# Patient Record
Sex: FEMALE | Race: WHITE | Employment: FULL TIME | URBAN - METROPOLITAN AREA
[De-identification: names, ages, dates, MRNs, and addresses within clinical notes are randomized per-mention and may not be internally consistent; named-entity substitution may affect disease eponyms.]

---

## 2017-01-23 ENCOUNTER — E-VISIT (OUTPATIENT)
Dept: FAMILY MEDICINE CLINIC | Facility: CLINIC | Age: 37
End: 2017-01-23

## 2017-01-23 DIAGNOSIS — Z02.9 ADMINISTRATIVE ENCOUNTER: Primary | ICD-10-CM

## 2017-01-23 PROCEDURE — 98969 ONLINE SERVICE BY HC PRO: CPT | Performed by: NURSE PRACTITIONER

## 2017-01-23 NOTE — PROGRESS NOTES
Patient submitted e-visit on 1/19/17. Other e-visit provider made myself aware that e-visit was not addressed. I called patient and apologized that e-visit was not addressed in promised time. Patient reports she went to another walk-in clinic for care.

## 2017-03-06 PROCEDURE — 82043 UR ALBUMIN QUANTITATIVE: CPT | Performed by: FAMILY MEDICINE

## 2017-03-06 PROCEDURE — 82570 ASSAY OF URINE CREATININE: CPT | Performed by: FAMILY MEDICINE

## 2017-03-06 NOTE — PROGRESS NOTES
Katheryn Pereyra is a 39year old female. HPI:   #1 follow-up on generalized anxiety disorder and depression    -- Mom and dad do live with family. Mom has fibromyalgia and Dad has dementia and is stable.    --Felt anxiety was better so weaned her mehrdad total) by mouth nightly. Disp: 30 tablet Rfl: 3   Rizatriptan Benzoate 10 MG Oral Tab TAKE 1 TABLET BY MOUTH AS NEEDED FOR MIGRAINE (TAKE AT ONSET AND REPEAT 2 HOURS LATER IF NEEDED).  Disp: 36 tablet Rfl: 1   alprazolam (XANAX) 0.5 MG Oral Tab Take 1 table exertion  CARDIOVASCULAR: denies chest pain on exertion  GI: denies abdominal pain and denies heartburn  NEURO: denies headaches  Musculoskeletal: No motor deficits  Psych see above  EXAM:   /41 mmHg  Pulse 80  Ht 62.01\"  Wt 178 lb  BMI 32.55 kg/m2 week then increase to 300 mg.    2. KENDY (generalized anxiety disorder)  BuSpar 5 mg in the morning 10 mg at night    3.  Intractable migraine without aura and without status migrainosus  Continue with Maxalt as needed  Amitriptyline at night 10-20 mg for pearl

## 2017-03-07 ENCOUNTER — TELEPHONE (OUTPATIENT)
Dept: FAMILY MEDICINE CLINIC | Facility: CLINIC | Age: 37
End: 2017-03-07

## 2017-03-07 ENCOUNTER — PATIENT MESSAGE (OUTPATIENT)
Dept: FAMILY MEDICINE CLINIC | Facility: CLINIC | Age: 37
End: 2017-03-07

## 2017-03-07 RX ORDER — BUPROPION HYDROCHLORIDE 150 MG/1
150 TABLET ORAL DAILY
Qty: 10 TABLET | Refills: 0 | Status: SHIPPED | OUTPATIENT
Start: 2017-03-07 | End: 2017-07-11

## 2017-03-07 RX ORDER — BUPROPION HYDROCHLORIDE 150 MG/1
150 TABLET ORAL DAILY
Qty: 10 TABLET | Refills: 0 | Status: SHIPPED | OUTPATIENT
Start: 2017-03-07 | End: 2017-03-07

## 2017-03-07 NOTE — TELEPHONE ENCOUNTER
----- Message from [address] [place] [address] [address] Generic sent at 3/7/2017  7:36 AM CST -----  Regarding: Prescription Question  Contact: [phone]  Gareth Mejia,  I picked up my prescriptions last night and the prescription they gave me for Wellbutrin is for 300mg.  I asked th

## 2017-03-07 NOTE — TELEPHONE ENCOUNTER
msg sent back to patient that the 150mg tablets were sent by our office and asked her to contact the pharmacy as they might of filled it incorrectly

## 2017-03-07 NOTE — TELEPHONE ENCOUNTER
Pt called and said she triple checked with the pharmacy and there is no prescription there for the 150mg Wellbutrin. They have the other 2 prescriptions but not the 150mg.

## 2017-03-15 ENCOUNTER — TELEPHONE (OUTPATIENT)
Dept: FAMILY MEDICINE CLINIC | Facility: CLINIC | Age: 37
End: 2017-03-15

## 2017-03-15 NOTE — TELEPHONE ENCOUNTER
Adelina's children had the flu. She feels she is getting it now.   She would like to know if Chris could be called in?

## 2017-03-15 NOTE — TELEPHONE ENCOUNTER
Patient has had two of her children diagnosed with influenza B within the past week. Patient is starting to feel aches and congestion. She is requesting a script for tamiflu.   Please advise

## 2017-03-16 RX ORDER — OSELTAMIVIR PHOSPHATE 75 MG/1
75 CAPSULE ORAL 2 TIMES DAILY
Qty: 10 CAPSULE | Refills: 0 | Status: SHIPPED | OUTPATIENT
Start: 2017-03-16 | End: 2017-05-09

## 2017-05-09 ENCOUNTER — HOSPITAL ENCOUNTER (EMERGENCY)
Age: 37
Discharge: HOME | End: 2017-05-09
Attending: EMERGENCY MEDICINE
Payer: COMMERCIAL

## 2017-05-09 ENCOUNTER — APPOINTMENT (OUTPATIENT)
Dept: ULTRASOUND IMAGING | Age: 37
End: 2017-05-09
Attending: EMERGENCY MEDICINE
Payer: COMMERCIAL

## 2017-05-09 ENCOUNTER — TELEPHONE (OUTPATIENT)
Dept: FAMILY MEDICINE CLINIC | Facility: CLINIC | Age: 37
End: 2017-05-09

## 2017-05-09 VITALS
WEIGHT: 175 LBS | SYSTOLIC BLOOD PRESSURE: 98 MMHG | DIASTOLIC BLOOD PRESSURE: 42 MMHG | RESPIRATION RATE: 16 BRPM | HEIGHT: 62 IN | OXYGEN SATURATION: 100 % | TEMPERATURE: 98 F | BODY MASS INDEX: 32.2 KG/M2 | HEART RATE: 75 BPM

## 2017-05-09 DIAGNOSIS — N30.90 CYSTITIS: Primary | ICD-10-CM

## 2017-05-09 PROCEDURE — 99284 EMERGENCY DEPT VISIT MOD MDM: CPT

## 2017-05-09 PROCEDURE — 80053 COMPREHEN METABOLIC PANEL: CPT | Performed by: EMERGENCY MEDICINE

## 2017-05-09 PROCEDURE — 76770 US EXAM ABDO BACK WALL COMP: CPT | Performed by: EMERGENCY MEDICINE

## 2017-05-09 PROCEDURE — 81015 MICROSCOPIC EXAM OF URINE: CPT | Performed by: EMERGENCY MEDICINE

## 2017-05-09 PROCEDURE — 81001 URINALYSIS AUTO W/SCOPE: CPT | Performed by: EMERGENCY MEDICINE

## 2017-05-09 PROCEDURE — 85025 COMPLETE CBC W/AUTO DIFF WBC: CPT | Performed by: EMERGENCY MEDICINE

## 2017-05-09 PROCEDURE — 36415 COLL VENOUS BLD VENIPUNCTURE: CPT

## 2017-05-09 PROCEDURE — 87086 URINE CULTURE/COLONY COUNT: CPT | Performed by: EMERGENCY MEDICINE

## 2017-05-09 RX ORDER — HYDROCODONE BITARTRATE AND ACETAMINOPHEN 5; 325 MG/1; MG/1
1 TABLET ORAL EVERY 4 HOURS PRN
Qty: 20 TABLET | Refills: 0 | Status: SHIPPED | OUTPATIENT
Start: 2017-05-09 | End: 2017-05-11

## 2017-05-09 RX ORDER — NITROFURANTOIN 25; 75 MG/1; MG/1
100 CAPSULE ORAL 2 TIMES DAILY
Qty: 20 CAPSULE | Refills: 0 | Status: SHIPPED | OUTPATIENT
Start: 2017-05-09 | End: 2017-05-19

## 2017-05-09 RX ORDER — HYDROCODONE BITARTRATE AND ACETAMINOPHEN 5; 325 MG/1; MG/1
2 TABLET ORAL ONCE
Status: COMPLETED | OUTPATIENT
Start: 2017-05-09 | End: 2017-05-09

## 2017-05-09 RX ORDER — PHENAZOPYRIDINE HYDROCHLORIDE 200 MG/1
200 TABLET, FILM COATED ORAL ONCE
Status: COMPLETED | OUTPATIENT
Start: 2017-05-09 | End: 2017-05-09

## 2017-05-09 RX ORDER — PHENAZOPYRIDINE HYDROCHLORIDE 100 MG/1
100 TABLET, FILM COATED ORAL 3 TIMES DAILY PRN
Qty: 6 TABLET | Refills: 0 | Status: SHIPPED | OUTPATIENT
Start: 2017-05-09 | End: 2017-05-16

## 2017-05-09 NOTE — ED PROVIDER NOTES
Patient Seen in: THE [facility] In [place]    History   Patient presents with:  Abdomen/Flank Pain (GI/)    Stated Complaint: lower abd pain, blood in urine    HPI    28-year-old female complaining of hematuria on the patient noted a sm Take 1 tablet (0.5 mg total) by mouth 2 (two) times daily as needed for Anxiety. Multiple Vitamins-Minerals (EMERGEN-C VITAMIN C OR),  Take by mouth 2 (two) times daily. Cyanocobalamin (VITAMIN B-12 OR),  Take 1,000 mcg by mouth daily.      Cholecalcif Alcohol Use: Yes           0.6 oz/week       1 Glasses of wine per week       Comment: rarely      Review of Systems    Positive for stated complaint: lower abd pain, blood in urine  Other systems are as noted in HPI.   Constitutional and vital normal limits   COMP METABOLIC PANEL (14) - Normal   CBC WITH DIFFERENTIAL WITH PLATELET    Narrative: The following orders were created for panel order CBC WITH DIFFERENTIAL WITH PLATELET.   Procedure                               Abnormality         S

## 2017-05-09 NOTE — TELEPHONE ENCOUNTER
Spoke to patient and she said that the pain is so bad that she may end go to the ER.   She said it just started this AM that she woke up and had burning with urination and noticed some blood but she just went again now and has very bad pain and noticed a lo

## 2017-05-09 NOTE — TELEPHONE ENCOUNTER
Pt called earlier about UTI symptoms and I told her Corie Mckeon was full be she can see Dr. Ramin Torrez and she refused. She called again asking for Pervis Earing due to her symptoms and I notified Que Reyes.

## 2017-07-11 ENCOUNTER — OFFICE VISIT (OUTPATIENT)
Dept: FAMILY MEDICINE CLINIC | Facility: CLINIC | Age: 37
End: 2017-07-11

## 2017-07-11 VITALS
BODY MASS INDEX: 33.68 KG/M2 | SYSTOLIC BLOOD PRESSURE: 90 MMHG | WEIGHT: 183 LBS | HEIGHT: 62 IN | HEART RATE: 88 BPM | DIASTOLIC BLOOD PRESSURE: 60 MMHG

## 2017-07-11 DIAGNOSIS — Z79.899 MEDICATION MANAGEMENT: ICD-10-CM

## 2017-07-11 DIAGNOSIS — F32.5 DEPRESSION, MAJOR, IN REMISSION (HCC): ICD-10-CM

## 2017-07-11 DIAGNOSIS — F41.1 GAD (GENERALIZED ANXIETY DISORDER): ICD-10-CM

## 2017-07-11 DIAGNOSIS — G43.019 INTRACTABLE MIGRAINE WITHOUT AURA AND WITHOUT STATUS MIGRAINOSUS: Primary | ICD-10-CM

## 2017-07-11 DIAGNOSIS — R31.9 HEMATURIA: ICD-10-CM

## 2017-07-11 DIAGNOSIS — Z87.440 RECENT URINARY TRACT INFECTION: ICD-10-CM

## 2017-07-11 LAB
APPEARANCE: CLEAR
BILIRUBIN: NEGATIVE
GLUCOSE (URINE DIPSTICK): NEGATIVE MG/DL
KETONES (URINE DIPSTICK): NEGATIVE MG/DL
LEUKOCYTES: NEGATIVE
MULTISTIX LOT#: ABNORMAL NUMERIC
NITRITE, URINE: NEGATIVE
PH, URINE: 5.5 (ref 4.5–8)
PROTEIN (URINE DIPSTICK): NEGATIVE MG/DL
SPECIFIC GRAVITY: 1.02 (ref 1–1.03)
URINE-COLOR: YELLOW
UROBILINOGEN,SEMI-QN: 0.2 MG/DL (ref 0–1.9)

## 2017-07-11 PROCEDURE — 99214 OFFICE O/P EST MOD 30 MIN: CPT | Performed by: FAMILY MEDICINE

## 2017-07-11 PROCEDURE — 81003 URINALYSIS AUTO W/O SCOPE: CPT | Performed by: FAMILY MEDICINE

## 2017-07-11 RX ORDER — AMITRIPTYLINE HYDROCHLORIDE 10 MG/1
TABLET, FILM COATED ORAL NIGHTLY
Qty: 30 TABLET | Refills: 5 | Status: SHIPPED | OUTPATIENT
Start: 2017-07-11 | End: 2017-10-29

## 2017-07-11 RX ORDER — ALPRAZOLAM 0.5 MG/1
0.5 TABLET ORAL 2 TIMES DAILY PRN
Qty: 45 TABLET | Refills: 1 | Status: SHIPPED | OUTPATIENT
Start: 2017-07-11 | End: 2018-02-05

## 2017-07-11 RX ORDER — BUSPIRONE HYDROCHLORIDE 10 MG/1
TABLET ORAL
Qty: 270 TABLET | Refills: 1 | Status: SHIPPED | OUTPATIENT
Start: 2017-07-11 | End: 2018-02-21

## 2017-07-11 RX ORDER — RIZATRIPTAN BENZOATE 10 MG/1
TABLET ORAL
Qty: 9 TABLET | Refills: 5 | Status: SHIPPED | OUTPATIENT
Start: 2017-07-11 | End: 2017-07-11

## 2017-07-11 RX ORDER — RIZATRIPTAN BENZOATE 10 MG/1
TABLET ORAL
Qty: 12 TABLET | Refills: 5 | Status: SHIPPED | OUTPATIENT
Start: 2017-07-11 | End: 2018-02-05

## 2017-07-11 RX ORDER — BUPROPION HYDROCHLORIDE 150 MG/1
150 TABLET ORAL DAILY
Qty: 30 TABLET | Refills: 5 | Status: SHIPPED | OUTPATIENT
Start: 2017-07-11 | End: 2018-06-08 | Stop reason: DRUGHIGH

## 2017-07-11 NOTE — PROGRESS NOTES
Gisselle Carrillo is a 39year old female.   HPI:   Patient presents with:    #1 f/u Migraine:   --refill on Amitrytiline & Maxalt  --HA are a lot better is having them once a week if mild migraine uses one Maxalt but sometimes tries Excedrin first   On Disp: 270 tablet Rfl: 1   ALPRAZolam (XANAX) 0.5 MG Oral Tab Take 1 tablet (0.5 mg total) by mouth 2 (two) times daily as needed for Anxiety.  Disp: 45 tablet Rfl: 1   Rizatriptan Benzoate 10 MG Oral Tab TAKE 1 TABLET BY MOUTH AS NEEDED FOR MIGRAINE (TAKE A apparent distress  SKIN: no rashes,no suspicious lesions  EYES: PERRLA, EOM intact, sclera clear without injection  NECK: supple,no adenopathy, thyroid normal to palpation  LUNGS: clear to auscultation no rales, rhonchi or wheezes  CARDIO: RRR without murm REPEAT 2 HOURS LATER IF NEEDED). Dispense: 12 tablet; Refill: 5    2. KENDY (generalized anxiety disorder)  Continue present medications conservative use of Xanax advised  - BusPIRone HCl 10 MG Oral Tab; 5 mg AM and 20 mg at night  Dispense: 270 tablet;  Ref

## 2017-07-11 NOTE — PROGRESS NOTES
Do a repeat urinalysis with microscopic in 1-2 weeks due to the blood present drink plenty of water before sampling.

## 2017-08-03 ENCOUNTER — LAB ENCOUNTER (OUTPATIENT)
Dept: LAB | Age: 37
End: 2017-08-03
Attending: FAMILY MEDICINE
Payer: COMMERCIAL

## 2017-08-03 ENCOUNTER — OFFICE VISIT (OUTPATIENT)
Dept: FAMILY MEDICINE CLINIC | Facility: CLINIC | Age: 37
End: 2017-08-03

## 2017-08-03 VITALS
WEIGHT: 184 LBS | SYSTOLIC BLOOD PRESSURE: 124 MMHG | BODY MASS INDEX: 33.86 KG/M2 | HEIGHT: 62 IN | HEART RATE: 86 BPM | DIASTOLIC BLOOD PRESSURE: 70 MMHG

## 2017-08-03 DIAGNOSIS — G89.29 CHRONIC RIGHT FLANK PAIN: ICD-10-CM

## 2017-08-03 DIAGNOSIS — Z13.220 LIPID SCREENING: ICD-10-CM

## 2017-08-03 DIAGNOSIS — R31.29 MICROSCOPIC HEMATURIA: ICD-10-CM

## 2017-08-03 DIAGNOSIS — R10.9 CHRONIC RIGHT FLANK PAIN: ICD-10-CM

## 2017-08-03 DIAGNOSIS — R31.29 MICROSCOPIC HEMATURIA: Primary | ICD-10-CM

## 2017-08-03 LAB
APPEARANCE: CLEAR
BILIRUBIN: NEGATIVE
BUN BLD-MCNC: 11 MG/DL (ref 8–20)
CALCIUM BLD-MCNC: 8.9 MG/DL (ref 8.3–10.3)
CHLORIDE: 109 MMOL/L (ref 101–111)
CHOLEST SMN-MCNC: 151 MG/DL (ref ?–200)
CO2: 26 MMOL/L (ref 22–32)
CREAT BLD-MCNC: 0.89 MG/DL (ref 0.55–1.02)
GLUCOSE (URINE DIPSTICK): NEGATIVE MG/DL
GLUCOSE BLD-MCNC: 90 MG/DL (ref 70–99)
HDLC SERPL-MCNC: 54 MG/DL (ref 45–?)
HDLC SERPL: 2.8 {RATIO} (ref ?–4.44)
KETONES (URINE DIPSTICK): NEGATIVE MG/DL
LDLC SERPL CALC-MCNC: 91 MG/DL (ref ?–130)
LDLC SERPL-MCNC: 6 MG/DL (ref 5–40)
LEUKOCYTES: NEGATIVE
MULTISTIX LOT#: NORMAL NUMERIC
NITRITE, URINE: NEGATIVE
NONHDLC SERPL-MCNC: 97 MG/DL (ref ?–130)
PH, URINE: 6.5 (ref 4.5–8)
POTASSIUM SERPL-SCNC: 4.3 MMOL/L (ref 3.6–5.1)
PROTEIN (URINE DIPSTICK): NEGATIVE MG/DL
SODIUM SERPL-SCNC: 140 MMOL/L (ref 136–144)
SPECIFIC GRAVITY: 1.01 (ref 1–1.03)
TRIGLYCERIDES: 28 MG/DL (ref ?–150)
URINE-COLOR: YELLOW
UROBILINOGEN,SEMI-QN: 0.2 MG/DL (ref 0–1.9)

## 2017-08-03 PROCEDURE — 80048 BASIC METABOLIC PNL TOTAL CA: CPT | Performed by: FAMILY MEDICINE

## 2017-08-03 PROCEDURE — 87086 URINE CULTURE/COLONY COUNT: CPT | Performed by: FAMILY MEDICINE

## 2017-08-03 PROCEDURE — 81003 URINALYSIS AUTO W/O SCOPE: CPT | Performed by: FAMILY MEDICINE

## 2017-08-03 PROCEDURE — 99213 OFFICE O/P EST LOW 20 MIN: CPT | Performed by: FAMILY MEDICINE

## 2017-08-03 PROCEDURE — 36415 COLL VENOUS BLD VENIPUNCTURE: CPT | Performed by: FAMILY MEDICINE

## 2017-08-03 PROCEDURE — 80061 LIPID PANEL: CPT | Performed by: FAMILY MEDICINE

## 2017-08-03 NOTE — PATIENT INSTRUCTIONS
Walter Organic Raw Apple Cider Vinegar with the mother 2 or 3 teaspoons (10-15 ml) in an 8 ounce glass of water, before meals or whenever heartburn is experienced. Hematuria: Possible Causes     Many things can lead to blood in the urine (hematuria).  [employer] [place] [address]. All rights reserved. This information is not intended as a substitute for professional medical care. Always follow your healthcare professional's instructions.

## 2017-08-03 NOTE — PROGRESS NOTES
Isaiah Brewster is a 39year old female. HPI:   F/U hematuria   Persistent microscopic had gross hematuria and may related to presumed UTI normal bladder kidney ultrasound.   No FH bladder or kidney cancer  Right lower back pain comes and goes throbb Rizatriptan Benzoate 10 MG Oral Tab TAKE 1 TABLET BY MOUTH AS NEEDED FOR MIGRAINE (TAKE AT ONSET AND REPEAT 2 HOURS LATER IF NEEDED).  Disp: 12 tablet Rfl: 5   Multiple Vitamins-Minerals (EMERGEN-C VITAMIN C OR) Take 1 packet by mouth 2 (two) times daily negative   EXTREMITIES: no cyanosis, clubbing or edema  Musculoskeletal: No gross deficit  Neurological: nerves II through XII grossly intact no sensorimotor deficit  Psychological: Mood and affect are normal.  Good communication skills.     Results for ord

## 2017-08-17 ENCOUNTER — PATIENT MESSAGE (OUTPATIENT)
Dept: FAMILY MEDICINE CLINIC | Facility: CLINIC | Age: 37
End: 2017-08-17

## 2017-08-18 NOTE — TELEPHONE ENCOUNTER
Fatigue has been under a lot of stress. Has appointment for Monday to discuss further. Also had CT abdomen done no kidney stones did have small cluster of prominent lymph nodes less than 7 mm in size.   Patient has no abdominal pain had some intermittent r

## 2017-08-18 NOTE — TELEPHONE ENCOUNTER
From: Greer Like  To: Dwain Corrigan PA-C  Sent: 8/17/2017 10:29 PM CDT  Subject: Test Results Question    Linette Harley,  Have you reviewed the results of my CT scan yet? I'm a bit nervous about it.

## 2017-08-18 NOTE — TELEPHONE ENCOUNTER
Has a lump that is mobile harder in nature, not connected to rib cage left side below the breast in the front noticed it last 2 night  Irregular in naature hard to find sometimes, Not tender except due to touching possible lipoma.

## 2017-08-21 ENCOUNTER — OFFICE VISIT (OUTPATIENT)
Dept: FAMILY MEDICINE CLINIC | Facility: CLINIC | Age: 37
End: 2017-08-21

## 2017-08-21 VITALS
HEART RATE: 106 BPM | DIASTOLIC BLOOD PRESSURE: 70 MMHG | SYSTOLIC BLOOD PRESSURE: 114 MMHG | OXYGEN SATURATION: 99 % | TEMPERATURE: 98 F | BODY MASS INDEX: 33.49 KG/M2 | WEIGHT: 182 LBS | HEIGHT: 62 IN | RESPIRATION RATE: 16 BRPM

## 2017-08-21 DIAGNOSIS — Z12.39 BREAST CANCER SCREENING: ICD-10-CM

## 2017-08-21 DIAGNOSIS — Z87.448 HISTORY OF HEMATURIA: ICD-10-CM

## 2017-08-21 DIAGNOSIS — R22.2 MASS OF CHEST WALL, LEFT: Primary | ICD-10-CM

## 2017-08-21 LAB
APPEARANCE: CLEAR
MULTISTIX LOT#: NORMAL NUMERIC
PH, URINE: 5.5 (ref 4.5–8)
SPECIFIC GRAVITY: 1.03 (ref 1–1.03)
URINE-COLOR: YELLOW
UROBILINOGEN,SEMI-QN: 0.2 MG/DL (ref 0–1.9)

## 2017-08-21 PROCEDURE — 81003 URINALYSIS AUTO W/O SCOPE: CPT | Performed by: FAMILY MEDICINE

## 2017-08-21 PROCEDURE — 99213 OFFICE O/P EST LOW 20 MIN: CPT | Performed by: FAMILY MEDICINE

## 2017-08-21 NOTE — PROGRESS NOTES
Luan Brenner is a 40year old female. HPI:   Patient presents for evaluation of a soft tissue mass on the right anterior aspect of her lower chest wall.   Patient states notices about a week ago somewhat tender is not sure is just because she has Biotin 1000 MCG Oral Tab Take 1,000 mcg by mouth daily. Disp:  Rfl:    Multiple Vitamin (MULTIVITAMIN OR) Take 1 Tab by mouth daily. Disp:  Rfl:    Omega-3 Fatty Acids (FISH OIL OR) Take 1 tablet by mouth daily.    Disp:  Rfl:       Past Medical History: deficit  Psychological: Mood and affect are normal.  Good communication skills.   ASSESSMENT AND PLAN:   Mass of chest wall, left  (primary encounter diagnosis)  Breast cancer screening  History of hematuria      Orders Placed This Encounter      Urine Dip,

## 2017-09-25 ENCOUNTER — MED REC SCAN ONLY (OUTPATIENT)
Dept: FAMILY MEDICINE CLINIC | Facility: CLINIC | Age: 37
End: 2017-09-25

## 2017-10-29 DIAGNOSIS — G43.019 INTRACTABLE MIGRAINE WITHOUT AURA AND WITHOUT STATUS MIGRAINOSUS: ICD-10-CM

## 2017-10-30 RX ORDER — AMITRIPTYLINE HYDROCHLORIDE 10 MG/1
TABLET, FILM COATED ORAL NIGHTLY
Qty: 30 TABLET | Refills: 2 | Status: SHIPPED | OUTPATIENT
Start: 2017-10-30 | End: 2018-05-29

## 2018-02-05 DIAGNOSIS — F41.1 GAD (GENERALIZED ANXIETY DISORDER): ICD-10-CM

## 2018-02-05 DIAGNOSIS — G43.019 INTRACTABLE MIGRAINE WITHOUT AURA AND WITHOUT STATUS MIGRAINOSUS: ICD-10-CM

## 2018-02-05 RX ORDER — RIZATRIPTAN BENZOATE 10 MG/1
TABLET ORAL
Qty: 12 TABLET | Refills: 0 | Status: SHIPPED | OUTPATIENT
Start: 2018-02-05 | End: 2018-03-11

## 2018-02-05 RX ORDER — ALPRAZOLAM 0.5 MG/1
TABLET ORAL
Qty: 30 TABLET | Refills: 0 | Status: SHIPPED | OUTPATIENT
Start: 2018-02-05 | End: 2018-07-03

## 2018-02-21 DIAGNOSIS — F41.1 GAD (GENERALIZED ANXIETY DISORDER): ICD-10-CM

## 2018-02-21 RX ORDER — BUSPIRONE HYDROCHLORIDE 10 MG/1
TABLET ORAL
Qty: 75 TABLET | Refills: 0 | Status: SHIPPED | OUTPATIENT
Start: 2018-02-21 | End: 2018-12-27

## 2018-02-23 ENCOUNTER — LAB ENCOUNTER (OUTPATIENT)
Dept: LAB | Age: 38
End: 2018-02-23
Attending: FAMILY MEDICINE
Payer: COMMERCIAL

## 2018-02-23 ENCOUNTER — OFFICE VISIT (OUTPATIENT)
Dept: FAMILY MEDICINE CLINIC | Facility: CLINIC | Age: 38
End: 2018-02-23

## 2018-02-23 VITALS
DIASTOLIC BLOOD PRESSURE: 60 MMHG | WEIGHT: 194 LBS | SYSTOLIC BLOOD PRESSURE: 90 MMHG | TEMPERATURE: 98 F | BODY MASS INDEX: 34.81 KG/M2 | HEART RATE: 84 BPM | HEIGHT: 62.76 IN

## 2018-02-23 DIAGNOSIS — R51.9 DAILY HEADACHE: ICD-10-CM

## 2018-02-23 DIAGNOSIS — J01.00 ACUTE NON-RECURRENT MAXILLARY SINUSITIS: ICD-10-CM

## 2018-02-23 DIAGNOSIS — G43.019 INTRACTABLE MIGRAINE WITHOUT AURA AND WITHOUT STATUS MIGRAINOSUS: ICD-10-CM

## 2018-02-23 DIAGNOSIS — R41.3 MEMORY CHANGE: ICD-10-CM

## 2018-02-23 DIAGNOSIS — F41.1 GAD (GENERALIZED ANXIETY DISORDER): ICD-10-CM

## 2018-02-23 DIAGNOSIS — R42 DIZZINESS: ICD-10-CM

## 2018-02-23 DIAGNOSIS — Z81.8 FH: DEMENTIA: ICD-10-CM

## 2018-02-23 DIAGNOSIS — R41.3 MEMORY CHANGE: Primary | ICD-10-CM

## 2018-02-23 DIAGNOSIS — E04.1 NONTOXIC UNINODULAR GOITER: ICD-10-CM

## 2018-02-23 DIAGNOSIS — E04.1 THYROID NODULE: ICD-10-CM

## 2018-02-23 LAB
BASOPHILS # BLD AUTO: 0.04 X10(3) UL (ref 0–0.1)
BASOPHILS NFR BLD AUTO: 0.3 %
EOSINOPHIL # BLD AUTO: 0.06 X10(3) UL (ref 0–0.3)
EOSINOPHIL NFR BLD AUTO: 0.5 %
ERYTHROCYTE [DISTWIDTH] IN BLOOD BY AUTOMATED COUNT: 12.2 % (ref 11.5–16)
FREE T4: 1 NG/DL (ref 0.9–1.8)
HAV AB SERPL IA-ACNC: >2000 PG/ML (ref 193–986)
HCT VFR BLD AUTO: 44.8 % (ref 34–50)
HGB BLD-MCNC: 14.9 G/DL (ref 12–16)
IMMATURE GRANULOCYTE COUNT: 0.07 X10(3) UL (ref 0–1)
IMMATURE GRANULOCYTE RATIO %: 0.5 %
LYMPHOCYTES # BLD AUTO: 3.85 X10(3) UL (ref 0.9–4)
LYMPHOCYTES NFR BLD AUTO: 29.4 %
MCH RBC QN AUTO: 31.1 PG (ref 27–33.2)
MCHC RBC AUTO-ENTMCNC: 33.3 G/DL (ref 31–37)
MCV RBC AUTO: 93.5 FL (ref 81–100)
MONOCYTES # BLD AUTO: 0.71 X10(3) UL (ref 0.1–1)
MONOCYTES NFR BLD AUTO: 5.4 %
NEUTROPHIL ABS PRELIM: 8.38 X10 (3) UL (ref 1.3–6.7)
NEUTROPHILS # BLD AUTO: 8.38 X10(3) UL (ref 1.3–6.7)
NEUTROPHILS NFR BLD AUTO: 63.9 %
PLATELET # BLD AUTO: 366 10(3)UL (ref 150–450)
RBC # BLD AUTO: 4.79 X10(6)UL (ref 3.8–5.1)
RED CELL DISTRIBUTION WIDTH-SD: 42.5 FL (ref 35.1–46.3)
SED RATE-ML: 8 MM/HR (ref 0–25)
TSI SER-ACNC: 0.84 MIU/ML (ref 0.35–5.5)
WBC # BLD AUTO: 13.1 X10(3) UL (ref 4–13)

## 2018-02-23 PROCEDURE — 84443 ASSAY THYROID STIM HORMONE: CPT | Performed by: FAMILY MEDICINE

## 2018-02-23 PROCEDURE — 85652 RBC SED RATE AUTOMATED: CPT | Performed by: FAMILY MEDICINE

## 2018-02-23 PROCEDURE — 85025 COMPLETE CBC W/AUTO DIFF WBC: CPT | Performed by: FAMILY MEDICINE

## 2018-02-23 PROCEDURE — 36415 COLL VENOUS BLD VENIPUNCTURE: CPT | Performed by: FAMILY MEDICINE

## 2018-02-23 PROCEDURE — 82607 VITAMIN B-12: CPT | Performed by: FAMILY MEDICINE

## 2018-02-23 PROCEDURE — 99214 OFFICE O/P EST MOD 30 MIN: CPT | Performed by: FAMILY MEDICINE

## 2018-02-23 PROCEDURE — 84439 ASSAY OF FREE THYROXINE: CPT | Performed by: FAMILY MEDICINE

## 2018-02-23 RX ORDER — PREDNISONE 10 MG/1
TABLET ORAL
Qty: 18 TABLET | Refills: 0 | Status: SHIPPED | OUTPATIENT
Start: 2018-02-23 | End: 2018-02-23 | Stop reason: CLARIF

## 2018-02-23 RX ORDER — AMOXICILLIN AND CLAVULANATE POTASSIUM 875; 125 MG/1; MG/1
1 TABLET, FILM COATED ORAL 2 TIMES DAILY
COMMUNITY
Start: 2018-02-21 | End: 2018-03-05 | Stop reason: ALTCHOICE

## 2018-02-23 RX ORDER — PREDNISONE 10 MG/1
TABLET ORAL
Qty: 21 TABLET | Refills: 0 | Status: SHIPPED | OUTPATIENT
Start: 2018-02-23 | End: 2018-03-05 | Stop reason: ALTCHOICE

## 2018-02-23 RX ORDER — ONDANSETRON 4 MG/1
4 TABLET, ORALLY DISINTEGRATING ORAL EVERY 8 HOURS PRN
Qty: 20 TABLET | Refills: 0 | Status: SHIPPED | OUTPATIENT
Start: 2018-02-23 | End: 2020-10-17

## 2018-02-23 RX ORDER — DIAZEPAM 5 MG/1
TABLET ORAL
Qty: 2 TABLET | Refills: 0 | Status: SHIPPED | OUTPATIENT
Start: 2018-02-23 | End: 2018-03-23 | Stop reason: ALTCHOICE

## 2018-02-23 RX ORDER — PREDNISONE 10 MG/1
TABLET ORAL AS DIRECTED
COMMUNITY
Start: 2018-02-21 | End: 2018-02-23

## 2018-02-23 RX ORDER — OSELTAMIVIR PHOSPHATE 75 MG/1
CAPSULE ORAL
COMMUNITY
Start: 2018-01-21 | End: 2018-02-23

## 2018-02-23 NOTE — PROGRESS NOTES
HPI:   Madhav Chaidez is a 40year old female who presents for headache, congestion and ear pain, dizziness on and off and swelling lymph nodes.   Patient had gone to the immediate care center on Wednesday diagnosed with lymphangitis and otitis media her thyroid. Needs order. Current Outpatient Prescriptions:  Amoxicillin-Pot Clavulanate 875-125 MG Oral Tab Take 1 tablet by mouth 2 (two) times daily.  Disp:  Rfl:    diazepam (VALIUM) 5 MG Oral Tab Take 5 mg 30 minutes before procedure if needed repe OTHER SURGICAL HISTORY      Comment: Excision of right axillary mass by Dr. Hafsa Short                at [facility]  No date: TOTAL ABDOM HYSTERECTOMY   Family History   Problem Relation Age of Onset   • Lipids Father      Hyperlipidemia   • Other[other] [ Days)   BMI 34.63 kg/m²   GENERAL: NAD, pleasant, not ill appearing, not lethargic does look fatigued is able to communicate effectively  SKIN: no visible rashes  EYES: PERRLA, EOMI, conjunctiva are non-injected  HENT: NCAT, EACs clear b/l, TMs normal b/l (AOV=37968)  US THYROID (CPT=76536)  1. Memory change  Due to dizziness, daily headaches and memory changes will do MRI brain  Valium before procedure order when approved by insurance  - MRI BRAIN/IAC (ALL W+WO CNTRST) (CPT=70553);  Future  - diazepam (ABBE counseling regarding medical conditions and treatment. The patient verbalizes understanding of the treatment and agrees with the plan. The patient is asked to return if symptoms persist or worsen.

## 2018-02-24 NOTE — PROGRESS NOTES
B12 has too much reduce B12 to 500 mcg daily. Slight elevation in white blood cell count and neutrophils consistent with bacterial infection continue with Augmentin.   Sed rate and thyroid are normal.

## 2018-02-26 ENCOUNTER — TELEPHONE (OUTPATIENT)
Dept: FAMILY MEDICINE CLINIC | Facility: CLINIC | Age: 38
End: 2018-02-26

## 2018-02-26 NOTE — TELEPHONE ENCOUNTER
----- Message from Brock Araiza PA-C sent at 2/23/2018  8:35 PM CST -----  B12 has too much reduce B12 to 500 mcg daily. Slight elevation in white blood cell count and neutrophils consistent with bacterial infection continue with Augmentin.   Sed rate

## 2018-02-27 ENCOUNTER — TELEPHONE (OUTPATIENT)
Dept: FAMILY MEDICINE CLINIC | Facility: CLINIC | Age: 38
End: 2018-02-27

## 2018-02-27 NOTE — TELEPHONE ENCOUNTER
Pt called and said she needs her MRI and US orders sent to [address] @ [phone]. I faxed them over.   She also said she is almost done with her antibiotic and she is getting a yeast infection and wants to know if something can be called in for

## 2018-02-28 RX ORDER — FLUCONAZOLE 150 MG/1
TABLET ORAL
Qty: 2 TABLET | Refills: 0 | Status: SHIPPED | OUTPATIENT
Start: 2018-02-28 | End: 2018-03-23 | Stop reason: ALTCHOICE

## 2018-03-05 ENCOUNTER — TELEPHONE (OUTPATIENT)
Dept: FAMILY MEDICINE CLINIC | Facility: CLINIC | Age: 38
End: 2018-03-05

## 2018-03-05 NOTE — TELEPHONE ENCOUNTER
Per June Wang PA-C, the patient was contacted in order to get an update on her symptoms.   The patient reported that she finished her steroids on Friday and over the weekend her ear pain returned along with pain in the glands behind her ears as well

## 2018-03-05 NOTE — TELEPHONE ENCOUNTER
Jerry Hess is calling to let Migdalia Longo know that she finished her steroids on Friday, and she is beginning to have ear pain again, she would like to speak with a nurse please call her at [phone]

## 2018-03-11 DIAGNOSIS — G43.019 INTRACTABLE MIGRAINE WITHOUT AURA AND WITHOUT STATUS MIGRAINOSUS: ICD-10-CM

## 2018-03-12 RX ORDER — RIZATRIPTAN BENZOATE 10 MG/1
TABLET ORAL
Qty: 12 TABLET | Refills: 0 | Status: SHIPPED | OUTPATIENT
Start: 2018-03-12 | End: 2018-04-10

## 2018-03-22 ENCOUNTER — TELEPHONE (OUTPATIENT)
Dept: FAMILY MEDICINE CLINIC | Facility: CLINIC | Age: 38
End: 2018-03-22

## 2018-03-22 NOTE — TELEPHONE ENCOUNTER
I confirmed in Epic that the right thyroid had been previously biopsied and discusses it with Roxy Adam PA-C, per her request.  Per Roxy Adam PA-C, the patient was informed that her Thyroid US showed a stable dominant 2.1 cm nodule right th

## 2018-03-23 NOTE — PROGRESS NOTES
Mony Samuels is a 40year old female. HPI:   Patient is in for follow-up on right ear pain happens intermittently. Admits that this does cause her some anxiety.   Lately do well the stress with her parents, money and recently with her full-time j guilt  no  8. Concentration: diminished ability to think or concentrate, or more indecisiveness  Is an issue with the fatigue  9. Suicidality: Thoughts of death or suicide, or has suicide plan   No suicidal or homicidal thoughts.         Current Outpatient Alcohol use: Yes           0.6 - 1.2 oz/week     Glasses of wine: 1 - 2 per week     Comment: rarely       REVIEW OF SYSTEMS:   GENERAL HEALTH: feels well otherwise  SKIN: denies any unusual skin lesions or rashes  RESPIRATORY: denies shortness of judah symptoms again recommended the increase to 300 mg patient will follow-up in the next month. Discussed if any problems call office immediately especially if gets increased depression, anxiety or any homicidal or suicidal symptoms.      2. Fatigue, unspecifi

## 2018-03-24 PROBLEM — F33.0 DEPRESSION, MAJOR, RECURRENT, MILD: Status: ACTIVE | Noted: 2018-03-24

## 2018-03-24 PROBLEM — F33.0 DEPRESSION, MAJOR, RECURRENT, MILD (HCC): Status: ACTIVE | Noted: 2018-03-24

## 2018-04-10 DIAGNOSIS — G43.019 INTRACTABLE MIGRAINE WITHOUT AURA AND WITHOUT STATUS MIGRAINOSUS: ICD-10-CM

## 2018-04-11 RX ORDER — RIZATRIPTAN BENZOATE 10 MG/1
TABLET ORAL
Qty: 12 TABLET | Refills: 0 | Status: SHIPPED | OUTPATIENT
Start: 2018-04-11 | End: 2018-05-29

## 2018-05-16 ENCOUNTER — HOSPITAL ENCOUNTER (EMERGENCY)
Age: 38
Discharge: HOME | End: 2018-05-16
Attending: EMERGENCY MEDICINE
Payer: COMMERCIAL

## 2018-05-17 NOTE — ED INITIAL ASSESSMENT (HPI)
PT STS [facility] DIAGNOSED WITH STREP THROAT AND STARTED ANTIBIOTICS YEST AND GIVEN PREDNISONE.

## 2018-05-17 NOTE — ED NOTES
PT AND MOTHER DEFENSIVE THROUGHOUT TRIAGE WITH RN. ASKING IF [facility] CALLED HERE AND SPOKE TO ANYONE REGARDING PT. PT THEN REPLIED \"OF COURSE THEY DID\". RN REINFORCED PT EDUCATION REGARDING HER ALREADY PRESCRIBED ANTIBIOTICS AND STEROIDS.  AFTER W

## 2018-05-18 ENCOUNTER — TELEPHONE (OUTPATIENT)
Dept: FAMILY MEDICINE CLINIC | Facility: CLINIC | Age: 38
End: 2018-05-18

## 2018-05-18 RX ORDER — FLUCONAZOLE 150 MG/1
TABLET ORAL
Qty: 2 TABLET | Refills: 0 | Status: SHIPPED | OUTPATIENT
Start: 2018-05-18 | End: 2018-12-31 | Stop reason: ALTCHOICE

## 2018-05-18 NOTE — TELEPHONE ENCOUNTER
Peter Bravo has been dealing with strep throat all week, she was put on a antibiotic and she thinks she developed a yeast infection from the antibiotic, she is just wondering what Patti Bridges would like her to do since it is the weekend, can she call something in

## 2018-05-29 DIAGNOSIS — G43.019 INTRACTABLE MIGRAINE WITHOUT AURA AND WITHOUT STATUS MIGRAINOSUS: ICD-10-CM

## 2018-05-30 RX ORDER — AMITRIPTYLINE HYDROCHLORIDE 10 MG/1
TABLET, FILM COATED ORAL NIGHTLY
Qty: 30 TABLET | Refills: 0 | Status: SHIPPED | OUTPATIENT
Start: 2018-05-30 | End: 2018-09-01

## 2018-05-30 RX ORDER — RIZATRIPTAN BENZOATE 10 MG/1
TABLET ORAL
Qty: 12 TABLET | Refills: 0 | Status: SHIPPED | OUTPATIENT
Start: 2018-05-30 | End: 2018-07-03

## 2018-06-08 RX ORDER — BUPROPION HYDROCHLORIDE 300 MG/1
300 TABLET ORAL DAILY
Qty: 30 TABLET | Refills: 1 | Status: SHIPPED | OUTPATIENT
Start: 2018-06-08 | End: 2018-08-06

## 2018-06-21 ENCOUNTER — OFFICE VISIT (OUTPATIENT)
Dept: FAMILY MEDICINE CLINIC | Facility: CLINIC | Age: 38
End: 2018-06-21

## 2018-06-21 VITALS
HEIGHT: 62.36 IN | DIASTOLIC BLOOD PRESSURE: 64 MMHG | SYSTOLIC BLOOD PRESSURE: 90 MMHG | WEIGHT: 183 LBS | HEART RATE: 100 BPM | BODY MASS INDEX: 33.25 KG/M2

## 2018-06-21 DIAGNOSIS — Z00.00 LABORATORY EXAMINATION ORDERED AS PART OF A ROUTINE GENERAL MEDICAL EXAMINATION: ICD-10-CM

## 2018-06-21 DIAGNOSIS — B99.9 RECURRENT INFECTIONS: ICD-10-CM

## 2018-06-21 DIAGNOSIS — Z90.710 HISTORY OF HYSTERECTOMY: ICD-10-CM

## 2018-06-21 DIAGNOSIS — Z00.00 WELL FEMALE EXAM WITHOUT GYNECOLOGICAL EXAM: Primary | ICD-10-CM

## 2018-06-21 DIAGNOSIS — R23.2 HOT FLASHES: ICD-10-CM

## 2018-06-21 PROCEDURE — 99395 PREV VISIT EST AGE 18-39: CPT | Performed by: FAMILY MEDICINE

## 2018-06-21 NOTE — PROGRESS NOTES
HPI:   Dagoberto Worthy is a 40year old female who presents for a complete physical exam.    Symptoms: denies discharge, itching, burning or dysuria, is S/P VERA, ovaries preserved.   Abnormal pap none had chronic pain after IUD was removed was diagnos x10(3) uL   Lymphocyte Absolute 3.85 0.90 - 4.00 x10(3) uL   Monocyte Absolute 0.71 0.10 - 1.00 x10(3) uL   Eosinophil Absolute 0.06 0.00 - 0.30 x10(3) uL   Basophil Absolute 0.04 0.00 - 0.10 x10(3) uL   Immature Granulocyte Absolute 0.07 0.00 - 1.00 x10(3 lipoma  :   1198: OTHER SURGICAL HISTORY      Comment: sinus surgery  10/28/2011: OTHER SURGICAL HISTORY      Comment: Excision of right axillary mass by Dr. Matt Moreau                at [facility]  No date: TOTAL ABDOM HYSTERECTOMY   Family H edema  VASCULAR: denies leg cramps  GI: denies abdominal pain, bowel movement changes, blood in stool  : denies urinary problems, vaginal discharge or discomfort,  periods hysterectomy  MUSCULOSKELETAL: denies joint pain or stiffness  NEURO: denies heada (Automated) [E]      LH (Luteinizing Hormone) [E]      [address] [facility] [E]      Estradiol [E]    Meds & Refills for this Visit:  No prescriptions requested or ordered in this encounter    Imaging & Consults:  None  1.  Well female exam without gynecological exam    Kiana

## 2018-07-03 ENCOUNTER — LAB ENCOUNTER (OUTPATIENT)
Dept: LAB | Age: 38
End: 2018-07-03
Attending: FAMILY MEDICINE
Payer: COMMERCIAL

## 2018-07-03 DIAGNOSIS — R53.83 FATIGUE, UNSPECIFIED TYPE: ICD-10-CM

## 2018-07-03 DIAGNOSIS — Z00.00 LABORATORY EXAMINATION ORDERED AS PART OF A ROUTINE GENERAL MEDICAL EXAMINATION: ICD-10-CM

## 2018-07-03 DIAGNOSIS — G43.019 INTRACTABLE MIGRAINE WITHOUT AURA AND WITHOUT STATUS MIGRAINOSUS: ICD-10-CM

## 2018-07-03 DIAGNOSIS — R63.5 WEIGHT GAIN: ICD-10-CM

## 2018-07-03 DIAGNOSIS — F41.1 GAD (GENERALIZED ANXIETY DISORDER): ICD-10-CM

## 2018-07-03 DIAGNOSIS — B99.9 RECURRENT INFECTIONS: ICD-10-CM

## 2018-07-03 DIAGNOSIS — R23.2 HOT FLASHES: ICD-10-CM

## 2018-07-03 LAB
BASOPHILS # BLD AUTO: 0.04 X10(3) UL (ref 0–0.1)
BASOPHILS NFR BLD AUTO: 0.6 %
CHOLEST SMN-MCNC: 175 MG/DL (ref ?–200)
CORTISOL: 8.3 UG/DL
EOSINOPHIL # BLD AUTO: 0.13 X10(3) UL (ref 0–0.3)
EOSINOPHIL NFR BLD AUTO: 1.8 %
ERYTHROCYTE [DISTWIDTH] IN BLOOD BY AUTOMATED COUNT: 12.5 % (ref 11.5–16)
ESTRADIOL: 116.3 PG/ML
FSH: 2.1 MIU/ML
HAV AB SERPL IA-ACNC: 460 PG/ML (ref 193–986)
HCT VFR BLD AUTO: 43.4 % (ref 34–50)
HDLC SERPL-MCNC: 47 MG/DL (ref 45–?)
HDLC SERPL: 3.72 {RATIO} (ref ?–4.44)
HGB BLD-MCNC: 14 G/DL (ref 12–16)
IMMATURE GRANULOCYTE COUNT: 0.03 X10(3) UL (ref 0–1)
IMMATURE GRANULOCYTE RATIO %: 0.4 %
LDLC SERPL CALC-MCNC: 119 MG/DL (ref ?–130)
LH: 6.7 MIU/ML
LYMPHOCYTES # BLD AUTO: 1.56 X10(3) UL (ref 0.9–4)
LYMPHOCYTES NFR BLD AUTO: 21.7 %
MCH RBC QN AUTO: 30.6 PG (ref 27–33.2)
MCHC RBC AUTO-ENTMCNC: 32.3 G/DL (ref 31–37)
MCV RBC AUTO: 94.8 FL (ref 81–100)
MONOCYTES # BLD AUTO: 0.45 X10(3) UL (ref 0.1–1)
MONOCYTES NFR BLD AUTO: 6.3 %
NEUTROPHIL ABS PRELIM: 4.97 X10 (3) UL (ref 1.3–6.7)
NEUTROPHILS # BLD AUTO: 4.97 X10(3) UL (ref 1.3–6.7)
NEUTROPHILS NFR BLD AUTO: 69.2 %
NONHDLC SERPL-MCNC: 128 MG/DL (ref ?–130)
PLATELET # BLD AUTO: 341 10(3)UL (ref 150–450)
RBC # BLD AUTO: 4.58 X10(6)UL (ref 3.8–5.1)
RED CELL DISTRIBUTION WIDTH-SD: 43.8 FL (ref 35.1–46.3)
SED RATE-ML: 15 MM/HR (ref 0–25)
TRIGL SERPL-MCNC: 44 MG/DL (ref ?–150)
VLDLC SERPL CALC-MCNC: 9 MG/DL (ref 5–40)
WBC # BLD AUTO: 7.2 X10(3) UL (ref 4–13)

## 2018-07-03 PROCEDURE — 82607 VITAMIN B-12: CPT | Performed by: FAMILY MEDICINE

## 2018-07-03 PROCEDURE — 85025 COMPLETE CBC W/AUTO DIFF WBC: CPT | Performed by: FAMILY MEDICINE

## 2018-07-03 PROCEDURE — 85652 RBC SED RATE AUTOMATED: CPT | Performed by: FAMILY MEDICINE

## 2018-07-03 PROCEDURE — 82670 ASSAY OF TOTAL ESTRADIOL: CPT | Performed by: FAMILY MEDICINE

## 2018-07-03 PROCEDURE — 82533 TOTAL CORTISOL: CPT | Performed by: FAMILY MEDICINE

## 2018-07-03 PROCEDURE — 36415 COLL VENOUS BLD VENIPUNCTURE: CPT | Performed by: FAMILY MEDICINE

## 2018-07-03 PROCEDURE — 83001 ASSAY OF GONADOTROPIN (FSH): CPT | Performed by: FAMILY MEDICINE

## 2018-07-03 PROCEDURE — 83002 ASSAY OF GONADOTROPIN (LH): CPT | Performed by: FAMILY MEDICINE

## 2018-07-03 PROCEDURE — 80061 LIPID PANEL: CPT | Performed by: FAMILY MEDICINE

## 2018-07-03 RX ORDER — ALPRAZOLAM 0.5 MG/1
TABLET ORAL
Qty: 30 TABLET | Refills: 0 | Status: SHIPPED | OUTPATIENT
Start: 2018-07-03 | End: 2018-10-10

## 2018-07-03 RX ORDER — RIZATRIPTAN BENZOATE 10 MG/1
TABLET ORAL
Qty: 12 TABLET | Refills: 0 | Status: SHIPPED | OUTPATIENT
Start: 2018-07-03 | End: 2018-08-09

## 2018-07-03 NOTE — TELEPHONE ENCOUNTER
Rizatriptan approved qty 12    Dr Mike Hunt advise refill of alprazolam  Last rx 2/5/18 qty 30 NR  Last ov 6/21/18 for physical

## 2018-07-07 NOTE — PROGRESS NOTES
Labs are all normal including hormone levels. No signs of menopause estrogen is adequately elevated.   Sent to my chart

## 2018-08-06 RX ORDER — BUPROPION HYDROCHLORIDE 300 MG/1
TABLET ORAL
Qty: 90 TABLET | Refills: 0 | Status: SHIPPED | OUTPATIENT
Start: 2018-08-06 | End: 2018-12-01

## 2018-08-09 DIAGNOSIS — G43.019 INTRACTABLE MIGRAINE WITHOUT AURA AND WITHOUT STATUS MIGRAINOSUS: ICD-10-CM

## 2018-08-10 RX ORDER — RIZATRIPTAN BENZOATE 10 MG/1
TABLET ORAL
Qty: 12 TABLET | Refills: 0 | Status: SHIPPED | OUTPATIENT
Start: 2018-08-10 | End: 2018-09-11

## 2018-09-01 DIAGNOSIS — G43.019 INTRACTABLE MIGRAINE WITHOUT AURA AND WITHOUT STATUS MIGRAINOSUS: ICD-10-CM

## 2018-09-04 RX ORDER — AMITRIPTYLINE HYDROCHLORIDE 10 MG/1
TABLET, FILM COATED ORAL NIGHTLY
Qty: 30 TABLET | Refills: 0 | Status: SHIPPED | OUTPATIENT
Start: 2018-09-04 | End: 2018-10-10

## 2018-09-11 DIAGNOSIS — G43.019 INTRACTABLE MIGRAINE WITHOUT AURA AND WITHOUT STATUS MIGRAINOSUS: ICD-10-CM

## 2018-09-11 RX ORDER — RIZATRIPTAN BENZOATE 10 MG/1
TABLET ORAL
Qty: 12 TABLET | Refills: 0 | Status: SHIPPED | OUTPATIENT
Start: 2018-09-11 | End: 2018-10-04

## 2018-10-04 DIAGNOSIS — G43.019 INTRACTABLE MIGRAINE WITHOUT AURA AND WITHOUT STATUS MIGRAINOSUS: ICD-10-CM

## 2018-10-04 RX ORDER — RIZATRIPTAN BENZOATE 10 MG/1
TABLET ORAL
Qty: 12 TABLET | Refills: 0 | Status: SHIPPED | OUTPATIENT
Start: 2018-10-04 | End: 2018-11-21

## 2018-10-10 DIAGNOSIS — G43.019 INTRACTABLE MIGRAINE WITHOUT AURA AND WITHOUT STATUS MIGRAINOSUS: ICD-10-CM

## 2018-10-10 DIAGNOSIS — F41.1 GAD (GENERALIZED ANXIETY DISORDER): ICD-10-CM

## 2018-10-10 RX ORDER — ALPRAZOLAM 0.5 MG/1
TABLET ORAL
Qty: 30 TABLET | Refills: 0 | Status: SHIPPED | OUTPATIENT
Start: 2018-10-10 | End: 2019-09-10

## 2018-10-10 RX ORDER — AMITRIPTYLINE HYDROCHLORIDE 10 MG/1
TABLET, FILM COATED ORAL NIGHTLY
Qty: 30 TABLET | Refills: 1 | Status: SHIPPED | OUTPATIENT
Start: 2018-10-10 | End: 2018-12-29

## 2018-10-11 DIAGNOSIS — F41.1 GAD (GENERALIZED ANXIETY DISORDER): ICD-10-CM

## 2018-10-12 RX ORDER — ALPRAZOLAM 0.5 MG/1
TABLET ORAL
Qty: 30 TABLET | Refills: 0 | OUTPATIENT
Start: 2018-10-12

## 2018-11-21 DIAGNOSIS — G43.019 INTRACTABLE MIGRAINE WITHOUT AURA AND WITHOUT STATUS MIGRAINOSUS: ICD-10-CM

## 2018-11-21 RX ORDER — RIZATRIPTAN BENZOATE 10 MG/1
TABLET ORAL
Qty: 12 TABLET | Refills: 0 | Status: SHIPPED | OUTPATIENT
Start: 2018-11-21 | End: 2018-12-19

## 2018-12-03 RX ORDER — BUPROPION HYDROCHLORIDE 300 MG/1
TABLET ORAL
Qty: 30 TABLET | Refills: 0 | Status: SHIPPED | OUTPATIENT
Start: 2018-12-03 | End: 2019-09-10

## 2018-12-03 NOTE — TELEPHONE ENCOUNTER
Bupropion approved qty 30 NR  Patient due for f/u  Please call to schedule appt-will need for further refills  [phone] (home)

## 2018-12-03 NOTE — TELEPHONE ENCOUNTER
ralphom for pt that she call the office to schedule an appt as she is due sometime in Dec/Jan for her 6 month follow up visit.

## 2018-12-19 DIAGNOSIS — G43.019 INTRACTABLE MIGRAINE WITHOUT AURA AND WITHOUT STATUS MIGRAINOSUS: ICD-10-CM

## 2018-12-20 RX ORDER — RIZATRIPTAN BENZOATE 10 MG/1
TABLET ORAL
Qty: 12 TABLET | Refills: 0 | Status: SHIPPED | OUTPATIENT
Start: 2018-12-20 | End: 2018-12-31

## 2018-12-27 DIAGNOSIS — F41.1 GAD (GENERALIZED ANXIETY DISORDER): ICD-10-CM

## 2018-12-28 RX ORDER — BUSPIRONE HYDROCHLORIDE 10 MG/1
TABLET ORAL
Qty: 75 TABLET | Refills: 0 | Status: SHIPPED | OUTPATIENT
Start: 2018-12-28 | End: 2019-03-21

## 2018-12-29 DIAGNOSIS — G43.019 INTRACTABLE MIGRAINE WITHOUT AURA AND WITHOUT STATUS MIGRAINOSUS: ICD-10-CM

## 2018-12-31 ENCOUNTER — OFFICE VISIT (OUTPATIENT)
Dept: FAMILY MEDICINE CLINIC | Facility: CLINIC | Age: 38
End: 2018-12-31
Payer: COMMERCIAL

## 2018-12-31 VITALS
HEIGHT: 62.36 IN | DIASTOLIC BLOOD PRESSURE: 64 MMHG | SYSTOLIC BLOOD PRESSURE: 84 MMHG | BODY MASS INDEX: 32.34 KG/M2 | HEART RATE: 88 BPM | WEIGHT: 178 LBS

## 2018-12-31 DIAGNOSIS — M25.552 LEFT HIP PAIN: ICD-10-CM

## 2018-12-31 DIAGNOSIS — F41.1 GAD (GENERALIZED ANXIETY DISORDER): ICD-10-CM

## 2018-12-31 DIAGNOSIS — G43.019 INTRACTABLE MIGRAINE WITHOUT AURA AND WITHOUT STATUS MIGRAINOSUS: Primary | ICD-10-CM

## 2018-12-31 PROCEDURE — 99214 OFFICE O/P EST MOD 30 MIN: CPT | Performed by: FAMILY MEDICINE

## 2018-12-31 RX ORDER — RIZATRIPTAN BENZOATE 10 MG/1
TABLET ORAL
Qty: 12 TABLET | Refills: 1 | Status: SHIPPED | OUTPATIENT
Start: 2018-12-31 | End: 2019-03-21

## 2018-12-31 RX ORDER — AMITRIPTYLINE HYDROCHLORIDE 10 MG/1
TABLET, FILM COATED ORAL NIGHTLY
Qty: 30 TABLET | Refills: 0 | Status: SHIPPED | OUTPATIENT
Start: 2018-12-31 | End: 2019-01-15

## 2018-12-31 NOTE — TELEPHONE ENCOUNTER
rx approved qty 30 NR  Future Appointments   Date Time Provider Brina Nadia   12/31/2018 12:00 PM Daisy Mccord PA-C [facility] [place]

## 2018-12-31 NOTE — PROGRESS NOTES
Dell Mistry is a 45year old female. HPI:   Anxiety follow up   Better with marriage less stress though still difficulties with her mom with the stress and her dad. They live with her in the basement.   Dealing with mom   Past year stress   Is t mouth every 8 (eight) hours as needed for Nausea. Disp: 20 tablet Rfl: 0   Multiple Vitamins-Minerals (EMERGEN-C VITAMIN C OR) Take 1 packet by mouth 2 (two) times daily as needed.    Disp:  Rfl:    Cholecalciferol (VITAMIN D3) 1000 UNITS Oral Cap Take 1 ta sensorimotor deficit  Psychological: Mood and affect are normal.  Good communication skills.   ASSESSMENT AND PLAN:     Intractable migraine without aura and without status migrainosus  (primary encounter diagnosis)  Left hip pain  Sonu (generalized anxiety

## 2019-01-15 DIAGNOSIS — G43.019 INTRACTABLE MIGRAINE WITHOUT AURA AND WITHOUT STATUS MIGRAINOSUS: ICD-10-CM

## 2019-01-15 RX ORDER — AMITRIPTYLINE HYDROCHLORIDE 10 MG/1
TABLET, FILM COATED ORAL NIGHTLY
Qty: 90 TABLET | Refills: 0 | Status: SHIPPED | OUTPATIENT
Start: 2019-01-15 | End: 2019-04-17

## 2019-01-15 NOTE — TELEPHONE ENCOUNTER
Luize Feeling    Rx asking for a 90 day refill? [id number] [address]urch for? Quantity changed to 90 if ok.   thanks

## 2019-01-28 ENCOUNTER — HOSPITAL ENCOUNTER (OUTPATIENT)
Dept: GENERAL RADIOLOGY | Age: 39
Discharge: HOME | End: 2019-01-28
Attending: FAMILY MEDICINE
Payer: COMMERCIAL

## 2019-01-28 DIAGNOSIS — M25.552 LEFT HIP PAIN: ICD-10-CM

## 2019-01-28 PROCEDURE — 73502 X-RAY EXAM HIP UNI 2-3 VIEWS: CPT | Performed by: FAMILY MEDICINE

## 2019-01-28 NOTE — PROGRESS NOTES
Normal XR hip do you want to see an orthopaedic for the pain? Benign pelvic phleboliths just areas of benign calcification in the lower pelvis area.   Sent to  My chart

## 2019-03-21 DIAGNOSIS — G43.019 INTRACTABLE MIGRAINE WITHOUT AURA AND WITHOUT STATUS MIGRAINOSUS: ICD-10-CM

## 2019-03-21 DIAGNOSIS — F41.1 GAD (GENERALIZED ANXIETY DISORDER): ICD-10-CM

## 2019-03-22 RX ORDER — RIZATRIPTAN BENZOATE 10 MG/1
TABLET ORAL
Qty: 12 TABLET | Refills: 1 | Status: SHIPPED | OUTPATIENT
Start: 2019-03-22 | End: 2019-05-31

## 2019-03-22 RX ORDER — BUSPIRONE HYDROCHLORIDE 10 MG/1
TABLET ORAL
Qty: 75 TABLET | Refills: 0 | Status: SHIPPED | OUTPATIENT
Start: 2019-03-22 | End: 2019-05-31

## 2019-04-17 ENCOUNTER — TELEPHONE (OUTPATIENT)
Dept: FAMILY MEDICINE CLINIC | Facility: CLINIC | Age: 39
End: 2019-04-17

## 2019-04-17 DIAGNOSIS — G43.019 INTRACTABLE MIGRAINE WITHOUT AURA AND WITHOUT STATUS MIGRAINOSUS: ICD-10-CM

## 2019-04-17 RX ORDER — AMITRIPTYLINE HYDROCHLORIDE 10 MG/1
TABLET, FILM COATED ORAL NIGHTLY
Qty: 90 TABLET | Refills: 0 | Status: SHIPPED | OUTPATIENT
Start: 2019-04-17 | End: 2019-06-10

## 2019-05-31 DIAGNOSIS — G43.019 INTRACTABLE MIGRAINE WITHOUT AURA AND WITHOUT STATUS MIGRAINOSUS: ICD-10-CM

## 2019-05-31 DIAGNOSIS — F41.1 GAD (GENERALIZED ANXIETY DISORDER): ICD-10-CM

## 2019-05-31 RX ORDER — RIZATRIPTAN BENZOATE 10 MG/1
TABLET ORAL
Qty: 12 TABLET | Refills: 0 | Status: SHIPPED | OUTPATIENT
Start: 2019-05-31 | End: 2019-06-25

## 2019-05-31 RX ORDER — BUSPIRONE HYDROCHLORIDE 10 MG/1
TABLET ORAL
Qty: 75 TABLET | Refills: 0 | Status: SHIPPED | OUTPATIENT
Start: 2019-05-31 | End: 2020-01-27 | Stop reason: ALTCHOICE

## 2019-06-10 ENCOUNTER — TELEPHONE (OUTPATIENT)
Dept: FAMILY MEDICINE CLINIC | Facility: CLINIC | Age: 39
End: 2019-06-10

## 2019-06-10 DIAGNOSIS — G43.019 INTRACTABLE MIGRAINE WITHOUT AURA AND WITHOUT STATUS MIGRAINOSUS: ICD-10-CM

## 2019-06-10 RX ORDER — AMITRIPTYLINE HYDROCHLORIDE 10 MG/1
TABLET, FILM COATED ORAL NIGHTLY
Qty: 180 TABLET | Refills: 0 | Status: SHIPPED | OUTPATIENT
Start: 2019-06-10 | End: 2019-09-09

## 2019-06-25 DIAGNOSIS — G43.019 INTRACTABLE MIGRAINE WITHOUT AURA AND WITHOUT STATUS MIGRAINOSUS: ICD-10-CM

## 2019-06-25 RX ORDER — RIZATRIPTAN BENZOATE 10 MG/1
TABLET ORAL
Qty: 12 TABLET | Refills: 0 | Status: SHIPPED | OUTPATIENT
Start: 2019-06-25 | End: 2019-07-30

## 2019-06-26 DIAGNOSIS — G43.019 INTRACTABLE MIGRAINE WITHOUT AURA AND WITHOUT STATUS MIGRAINOSUS: ICD-10-CM

## 2019-06-26 RX ORDER — RIZATRIPTAN BENZOATE 10 MG/1
TABLET ORAL
Qty: 12 TABLET | Refills: 0 | Status: CANCELLED | OUTPATIENT
Start: 2019-06-26

## 2019-07-30 DIAGNOSIS — G43.019 INTRACTABLE MIGRAINE WITHOUT AURA AND WITHOUT STATUS MIGRAINOSUS: ICD-10-CM

## 2019-07-30 NOTE — TELEPHONE ENCOUNTER
Patient asking for refill of rizatriptan. Last OV 12/31/18. Asked to return in 3mo but did not. Last fill 6/25/19 12 tabs no refill. Ok to fill and call her for appointment?

## 2019-07-31 RX ORDER — RIZATRIPTAN BENZOATE 10 MG/1
TABLET ORAL
Qty: 12 TABLET | Refills: 0 | Status: SHIPPED | OUTPATIENT
Start: 2019-07-31 | End: 2019-09-05

## 2019-07-31 NOTE — TELEPHONE ENCOUNTER
Left detailed message for patient advising one last refill was sent and she needs to schedule an appointment for further refills

## 2019-09-05 DIAGNOSIS — G43.019 INTRACTABLE MIGRAINE WITHOUT AURA AND WITHOUT STATUS MIGRAINOSUS: ICD-10-CM

## 2019-09-06 RX ORDER — RIZATRIPTAN BENZOATE 10 MG/1
TABLET ORAL
Qty: 6 TABLET | Refills: 0 | Status: SHIPPED | OUTPATIENT
Start: 2019-09-06 | End: 2019-10-01

## 2019-09-09 DIAGNOSIS — G43.019 INTRACTABLE MIGRAINE WITHOUT AURA AND WITHOUT STATUS MIGRAINOSUS: ICD-10-CM

## 2019-09-09 RX ORDER — AMITRIPTYLINE HYDROCHLORIDE 10 MG/1
TABLET, FILM COATED ORAL NIGHTLY
Qty: 60 TABLET | Refills: 0 | Status: SHIPPED | OUTPATIENT
Start: 2019-09-09 | End: 2019-10-02

## 2019-09-09 NOTE — TELEPHONE ENCOUNTER
Pt requesting refill of AMITRIPTYLINE HCL 10 MG Oral Tab    Last Time Medication was Filled: 6/10/19 qty90    Last Office Visit with PCP: 12/31/18.  AMITRIPTYLINE HCL 10 MG Oral Tab     future appointment 9/10/19

## 2019-09-10 ENCOUNTER — OFFICE VISIT (OUTPATIENT)
Dept: FAMILY MEDICINE CLINIC | Facility: CLINIC | Age: 39
End: 2019-09-10
Payer: COMMERCIAL

## 2019-09-10 VITALS
SYSTOLIC BLOOD PRESSURE: 92 MMHG | HEART RATE: 88 BPM | DIASTOLIC BLOOD PRESSURE: 68 MMHG | HEIGHT: 62.36 IN | WEIGHT: 189 LBS | BODY MASS INDEX: 34.34 KG/M2

## 2019-09-10 DIAGNOSIS — Z00.00 LABORATORY EXAMINATION ORDERED AS PART OF A ROUTINE GENERAL MEDICAL EXAMINATION: ICD-10-CM

## 2019-09-10 DIAGNOSIS — F41.1 GAD (GENERALIZED ANXIETY DISORDER): ICD-10-CM

## 2019-09-10 DIAGNOSIS — Z79.899 MEDICATION MANAGEMENT: ICD-10-CM

## 2019-09-10 DIAGNOSIS — Z23 FLU VACCINE NEED: ICD-10-CM

## 2019-09-10 DIAGNOSIS — F33.0 DEPRESSION, MAJOR, RECURRENT, MILD (HCC): ICD-10-CM

## 2019-09-10 DIAGNOSIS — Z00.00 WELL FEMALE EXAM WITHOUT GYNECOLOGICAL EXAM: Primary | ICD-10-CM

## 2019-09-10 PROCEDURE — 90471 IMMUNIZATION ADMIN: CPT | Performed by: FAMILY MEDICINE

## 2019-09-10 PROCEDURE — 90686 IIV4 VACC NO PRSV 0.5 ML IM: CPT | Performed by: FAMILY MEDICINE

## 2019-09-10 PROCEDURE — 99395 PREV VISIT EST AGE 18-39: CPT | Performed by: FAMILY MEDICINE

## 2019-09-10 PROCEDURE — 99213 OFFICE O/P EST LOW 20 MIN: CPT | Performed by: FAMILY MEDICINE

## 2019-09-10 RX ORDER — ALPRAZOLAM 0.5 MG/1
TABLET ORAL
Qty: 30 TABLET | Refills: 0 | Status: SHIPPED | OUTPATIENT
Start: 2019-09-10 | End: 2020-07-13

## 2019-09-10 RX ORDER — FLUOXETINE HYDROCHLORIDE 20 MG/1
20 CAPSULE ORAL DAILY
Qty: 30 CAPSULE | Refills: 1 | Status: SHIPPED | OUTPATIENT
Start: 2019-09-10 | End: 2019-10-02

## 2019-09-10 NOTE — PROGRESS NOTES
HPI:   Malika Maradiaga is a 44year old female who presents for a complete physical exam. Symptoms: is S/P VERA, ovaries preserved.    #1 follow-up on generalized anxiety disorder mild depression  Was unable to tolerate Wellbutrin due to increase in an 193 - 986 pg/mL   CORTISOL   Result Value Ref Range    Cortisol 8.3 ug/dL   LIPID PANEL   Result Value Ref Range    Cholesterol, Total 175 <200 mg/dL    Triglycerides 44 <150 mg/dL    HDL Cholesterol 47 >45 mg/dL    LDL Cholesterol 119 <130 mg/dL    VLDL 9 Oral Tab TAKE ONE-HALF TABLET BY MOUTH EVERY MORNING THEN TAKE TWO TABLETS BY MOUTH EVERY NIGHT AT BEDTIME (Patient taking differently: Take 20 mg by mouth nightly.  TAKE ONE-HALF TABLET BY MOUTH EVERY MORNING THEN TAKE TWO TABLETS BY MOUTH EVERY NIGHT AT B Anxiety Son    • Anxiety Brother    • Other (Asperger's disorder[other]) Son    • ADHD Brother    • ADHD Son    • OCD Son    • Other (Pervasive developmental disorders[other]) Brother    • Other (Tourette's syndrome[other]) Son    • Other (Type I Arnold-Ch nail bed  HEENT: atraumatic, normocephalic,ears, nose and throat are normal  EYES: PERRLA, EOMI, sclera, conjunctiva are clear  NECK: supple,no adenopathy,no carotid bruits  CHEST: no chest tenderness  BREAST: symmetrical, no suspicious mass, no nipple dim exercise 30 minutes 3-4 times weekly.     2. KENDY (generalized anxiety disorder)Depression, major, recurrent, mild ([facility]  Discussed if any problems call office immediately especially if gets increased depression, anxiety or any homicidal or suicidal symptoms

## 2019-09-11 ENCOUNTER — TELEPHONE (OUTPATIENT)
Dept: FAMILY MEDICINE CLINIC | Facility: CLINIC | Age: 39
End: 2019-09-11

## 2019-09-11 NOTE — TELEPHONE ENCOUNTER
Per Brock Araiza PA-C, [facility] was contacted in order to request that the patient's mammogram from last year be faxed to our office. The representative at [facility] agreed to fax it.

## 2019-10-01 DIAGNOSIS — G43.019 INTRACTABLE MIGRAINE WITHOUT AURA AND WITHOUT STATUS MIGRAINOSUS: ICD-10-CM

## 2019-10-01 RX ORDER — RIZATRIPTAN BENZOATE 10 MG/1
TABLET ORAL
Qty: 6 TABLET | Refills: 0 | Status: SHIPPED | OUTPATIENT
Start: 2019-10-01 | End: 2019-10-02

## 2019-10-02 ENCOUNTER — PATIENT MESSAGE (OUTPATIENT)
Dept: FAMILY MEDICINE CLINIC | Facility: CLINIC | Age: 39
End: 2019-10-02

## 2019-10-02 DIAGNOSIS — F33.0 DEPRESSION, MAJOR, RECURRENT, MILD (HCC): ICD-10-CM

## 2019-10-02 DIAGNOSIS — F41.1 GAD (GENERALIZED ANXIETY DISORDER): ICD-10-CM

## 2019-10-02 DIAGNOSIS — G43.019 INTRACTABLE MIGRAINE WITHOUT AURA AND WITHOUT STATUS MIGRAINOSUS: ICD-10-CM

## 2019-10-02 RX ORDER — AMITRIPTYLINE HYDROCHLORIDE 10 MG/1
TABLET, FILM COATED ORAL NIGHTLY
Qty: 60 TABLET | Refills: 0 | Status: SHIPPED | OUTPATIENT
Start: 2019-10-02 | End: 2019-10-30

## 2019-10-02 RX ORDER — RIZATRIPTAN BENZOATE 10 MG/1
TABLET ORAL
Qty: 12 TABLET | Refills: 2 | Status: SHIPPED | OUTPATIENT
Start: 2019-10-02 | End: 2020-01-09

## 2019-10-02 RX ORDER — FLUOXETINE HYDROCHLORIDE 20 MG/1
CAPSULE ORAL
Qty: 30 CAPSULE | Refills: 0 | Status: SHIPPED | OUTPATIENT
Start: 2019-10-02 | End: 2019-10-30

## 2019-10-02 NOTE — TELEPHONE ENCOUNTER
From: Berenice Blevins  To: Les Garcia PA-C  Sent: 10/2/2019 2:11 PM CDT  Subject: Prescription Question    Hi Alvino Mccann,  My maxalt prescription was called in for only 6 tablets. Do you happen to know why?  I have not picked it up yet, as I wan

## 2019-10-02 NOTE — PROGRESS NOTES
Received prior auth request from [facility] for rizatriptan. Key [id number]. Sent to plan. Patient aware medication requiring prior auth. Will notify patient of outcome of authorization.

## 2019-10-02 NOTE — TELEPHONE ENCOUNTER
Pt requesting refill of FLUOXETINE HCL 20 MG Oral Cap and AMITRIPTYLINE HCL 10 MG Oral Tab, refill approved, sent to pharmacy:       Last Office Visit with PCP: 9/10/19      No future appointments.

## 2019-10-03 NOTE — TELEPHONE ENCOUNTER
Fax received from [address] [place] [address] states the patient's prescription benefit coverage indicates a PA in NOT required for Rizatriptan 10mg. Fax was fwd to the pharmacy.

## 2019-10-30 DIAGNOSIS — G43.019 INTRACTABLE MIGRAINE WITHOUT AURA AND WITHOUT STATUS MIGRAINOSUS: ICD-10-CM

## 2019-10-30 DIAGNOSIS — F41.1 GAD (GENERALIZED ANXIETY DISORDER): ICD-10-CM

## 2019-10-30 DIAGNOSIS — F33.0 DEPRESSION, MAJOR, RECURRENT, MILD (HCC): ICD-10-CM

## 2019-10-30 RX ORDER — AMITRIPTYLINE HYDROCHLORIDE 10 MG/1
TABLET, FILM COATED ORAL NIGHTLY
Qty: 60 TABLET | Refills: 0 | Status: SHIPPED | OUTPATIENT
Start: 2019-10-30 | End: 2019-11-21

## 2019-10-30 RX ORDER — FLUOXETINE HYDROCHLORIDE 20 MG/1
CAPSULE ORAL
Qty: 30 CAPSULE | Refills: 0 | Status: SHIPPED | OUTPATIENT
Start: 2019-10-30 | End: 2019-11-21

## 2019-10-30 NOTE — TELEPHONE ENCOUNTER
Pt requesting refill of AMITRIPTYLINE HCL 10 MG Oral Tab and FLUOXETINE HCL 20 MG Oral Cap refill approved, sent to pharmacy:     Last Time Medication was Filled:  10/2/19    Last Office Visit with PCP: 9/10/19    Has future appointment 11/4/19

## 2019-11-21 DIAGNOSIS — F33.0 DEPRESSION, MAJOR, RECURRENT, MILD (HCC): ICD-10-CM

## 2019-11-21 DIAGNOSIS — F41.1 GAD (GENERALIZED ANXIETY DISORDER): ICD-10-CM

## 2019-11-21 DIAGNOSIS — G43.019 INTRACTABLE MIGRAINE WITHOUT AURA AND WITHOUT STATUS MIGRAINOSUS: ICD-10-CM

## 2019-11-21 RX ORDER — AMITRIPTYLINE HYDROCHLORIDE 10 MG/1
TABLET, FILM COATED ORAL NIGHTLY
Qty: 60 TABLET | Refills: 0 | Status: SHIPPED | OUTPATIENT
Start: 2019-11-21 | End: 2019-12-26

## 2019-11-21 RX ORDER — FLUOXETINE HYDROCHLORIDE 20 MG/1
CAPSULE ORAL
Qty: 30 CAPSULE | Refills: 0 | Status: SHIPPED | OUTPATIENT
Start: 2019-11-21 | End: 2019-12-04

## 2019-11-21 NOTE — TELEPHONE ENCOUNTER
Refill sent to pharmacy for amitriptyline for 30 days. Voicemail left reminding patient Cantu Jessee would like a follow up visit from September.

## 2019-12-04 DIAGNOSIS — F33.0 DEPRESSION, MAJOR, RECURRENT, MILD (HCC): ICD-10-CM

## 2019-12-04 DIAGNOSIS — F41.1 GAD (GENERALIZED ANXIETY DISORDER): ICD-10-CM

## 2019-12-04 RX ORDER — FLUOXETINE HYDROCHLORIDE 20 MG/1
20 CAPSULE ORAL
Qty: 30 CAPSULE | Refills: 0 | OUTPATIENT
Start: 2019-12-04

## 2019-12-10 ENCOUNTER — PATIENT MESSAGE (OUTPATIENT)
Dept: FAMILY MEDICINE CLINIC | Facility: CLINIC | Age: 39
End: 2019-12-10

## 2019-12-10 NOTE — TELEPHONE ENCOUNTER
From: Gaby Mendoza  To: Alireza Dennis PA-C  Sent: 12/10/2019 11:41 AM CST  Subject: Other    Good Morning,  I need to cancel my appointment for today.  I've been sick with a migraine since 4am. I have tried calling several times and each time t

## 2019-12-26 DIAGNOSIS — G43.019 INTRACTABLE MIGRAINE WITHOUT AURA AND WITHOUT STATUS MIGRAINOSUS: ICD-10-CM

## 2019-12-26 RX ORDER — AMITRIPTYLINE HYDROCHLORIDE 10 MG/1
TABLET, FILM COATED ORAL NIGHTLY
Qty: 60 TABLET | Refills: 0 | Status: SHIPPED | OUTPATIENT
Start: 2019-12-26 | End: 2020-01-13

## 2019-12-26 NOTE — TELEPHONE ENCOUNTER
Pt requesting refill of AMITRIPTYLINE HCL 10 MG Oral Tab    Last fillled 11/21/19  LOV 9/10/19    No follow up appointment

## 2020-01-09 DIAGNOSIS — G43.019 INTRACTABLE MIGRAINE WITHOUT AURA AND WITHOUT STATUS MIGRAINOSUS: ICD-10-CM

## 2020-01-09 RX ORDER — RIZATRIPTAN BENZOATE 10 MG/1
TABLET ORAL
Qty: 12 TABLET | Refills: 0 | Status: SHIPPED | OUTPATIENT
Start: 2020-01-09 | End: 2020-01-27

## 2020-01-09 NOTE — TELEPHONE ENCOUNTER
is in he states his wife needed refill on Maxalt. She did not make appointment with neurologist  will remind her to get consult with neurologist.  She is getting migraines very frequently.  Refill provided needs appointment in the office [facility]

## 2020-01-13 DIAGNOSIS — G43.019 INTRACTABLE MIGRAINE WITHOUT AURA AND WITHOUT STATUS MIGRAINOSUS: ICD-10-CM

## 2020-01-13 RX ORDER — AMITRIPTYLINE HYDROCHLORIDE 10 MG/1
TABLET, FILM COATED ORAL NIGHTLY
Qty: 60 TABLET | Refills: 0 | Status: SHIPPED | OUTPATIENT
Start: 2020-01-13 | End: 2020-07-09

## 2020-01-13 NOTE — TELEPHONE ENCOUNTER
Pt requesting refill of AMITRIPTYLINE HCL 10 MG Oral Tab     Last fillled 12/16/19  LOV 9/10/19     Has follow up appointment 1/27/20

## 2020-01-28 NOTE — PROGRESS NOTES
Fly Montgomery is a 44year old female. HPI:   Patient is in for follow-up on generalized anxiety disorder and depression.   Did start the 40 mg of Prozac and is doing well though does describe still more anxiety symptoms and would consider going up Thoughts that you would be better off dead, or of hurting yourself in some way 0   PHQ-9 TOTAL SCORE 4   If you checked off any problems, how difficult have these problems made it for you to do your work, take care of things at home, or get along with ot exertion  CARDIOVASCULAR: denies chest pain on exertion  GI: denies abdominal pain and denies heartburn  NEURO: See above  Musculoskeletal: No motor deficits  Psych see above  EXAM:   /70   Pulse 96   Temp 98.2 °F (36.8 °C) (Oral)   Resp 16   Ht 62. 3 remission ([facility]  Discussed if any problems call office immediately especially if gets increased depression, anxiety or any homicidal or suicidal symptoms. Patient symptoms are presently stable    - FLUoxetine HCl 40 MG Oral Cap;  Take 1 capsule (40 mg tot

## 2020-05-28 DIAGNOSIS — G43.019 INTRACTABLE MIGRAINE WITHOUT AURA AND WITHOUT STATUS MIGRAINOSUS: ICD-10-CM

## 2020-05-28 RX ORDER — RIZATRIPTAN BENZOATE 10 MG/1
TABLET ORAL
Qty: 12 TABLET | Refills: 2 | Status: SHIPPED | OUTPATIENT
Start: 2020-05-28 | End: 2020-09-02

## 2020-05-28 NOTE — TELEPHONE ENCOUNTER
Pt requesting refill of RIZATRIPTAN BENZOATE 10 MG Oral Tab    Last Time Medication was Filled:  1/9/20    Last Office Visit with Provider: 1/27/20. The patient is asked to return in every 6 months annual wellness due 9/10/2020. No future appointments.

## 2020-07-09 DIAGNOSIS — G43.019 INTRACTABLE MIGRAINE WITHOUT AURA AND WITHOUT STATUS MIGRAINOSUS: ICD-10-CM

## 2020-07-09 RX ORDER — AMITRIPTYLINE HYDROCHLORIDE 10 MG/1
TABLET, FILM COATED ORAL NIGHTLY
Qty: 60 TABLET | Refills: 0 | Status: SHIPPED | OUTPATIENT
Start: 2020-07-09 | End: 2020-08-03

## 2020-07-09 NOTE — TELEPHONE ENCOUNTER
LAST REFILLED 1/13/20  LOV 1/27/20. The patient is asked to return in every 6 months annual wellness due 9/10/2020.

## 2020-07-14 NOTE — PROGRESS NOTES
Virtual Telephone Check-In    Victor Manuel Hughes verbally consents to a Virtual/Telephone Check-In visit on 07/14/20. Patient has been referred to the [facility] website at [email or web address] to review the yearly Consent to Treat document.     Patient u consultation with neurology yet. Also uses the Xanax when restless leg is acting up due to her anxiety about a half Xanax at night if needed.   Did try BuSpar before and did not find it to be successful feels the Prozac has been very beneficial.  PHQ9 FLOW tablet by mouth daily. • Omega-3 Fatty Acids (FISH OIL OR) Take 1 tablet by mouth daily.           Past Medical History:   Diagnosis Date   • Lymphedema     r arm   • Migraines    • Multiple thyroid nodules     1997   • Pancreatitis 2000      Social His risks, benefits and precautions of alprazolam discussed. Including not to drive, operate machinery or drink alcohol when taking this medication.   Discussed if any problems call office immediately especially if gets increased depression, anxiety or any homi

## 2020-08-03 DIAGNOSIS — G43.019 INTRACTABLE MIGRAINE WITHOUT AURA AND WITHOUT STATUS MIGRAINOSUS: ICD-10-CM

## 2020-08-03 RX ORDER — AMITRIPTYLINE HYDROCHLORIDE 10 MG/1
TABLET, FILM COATED ORAL
Qty: 60 TABLET | Refills: 0 | Status: SHIPPED | OUTPATIENT
Start: 2020-08-03 | End: 2020-09-11

## 2020-08-03 NOTE — TELEPHONE ENCOUNTER
Pt requesting refill of AMITRIPTYLINE HCL 10 MG Oral Tab    Last Time Medication was Filled:  7/9/20    Last Office Visit with Provider: 7/13/20  No future appointments.

## 2020-08-21 DIAGNOSIS — F33.40 DEPRESSION, MAJOR, RECURRENT, IN REMISSION (HCC): ICD-10-CM

## 2020-08-21 DIAGNOSIS — F41.1 GAD (GENERALIZED ANXIETY DISORDER): ICD-10-CM

## 2020-08-21 RX ORDER — FLUOXETINE HYDROCHLORIDE 40 MG/1
CAPSULE ORAL
Qty: 90 CAPSULE | Refills: 1 | OUTPATIENT
Start: 2020-08-21

## 2020-08-21 NOTE — TELEPHONE ENCOUNTER
Requested Prescriptions     Refused Prescriptions Disp Refills   • FLUOXETINE HCL 40 MG Oral Cap [Pharmacy Med Name: FLUOXETINE HCL 40 MG CAPSULE] 90 capsule 1     Sig: TAKE 1 CAPSULE BY MOUTH EVERY DAY     Filled 20mg on 7/13/20 90 1 refill.  This script d

## 2020-09-01 DIAGNOSIS — G43.019 INTRACTABLE MIGRAINE WITHOUT AURA AND WITHOUT STATUS MIGRAINOSUS: ICD-10-CM

## 2020-09-02 RX ORDER — RIZATRIPTAN BENZOATE 10 MG/1
TABLET ORAL
Qty: 12 TABLET | Refills: 1 | Status: SHIPPED | OUTPATIENT
Start: 2020-09-02 | End: 2020-12-10

## 2020-09-02 NOTE — TELEPHONE ENCOUNTER
Pt requesting refill of RIZATRIPTAN BENZOATE 10 MG Oral Tab    Last Time Medication was Filled:  6/26/20    Last Office Visit with Provider: emilia 7/13/20     No future appointments.

## 2020-09-11 DIAGNOSIS — G43.019 INTRACTABLE MIGRAINE WITHOUT AURA AND WITHOUT STATUS MIGRAINOSUS: ICD-10-CM

## 2020-09-11 RX ORDER — AMITRIPTYLINE HYDROCHLORIDE 10 MG/1
TABLET, FILM COATED ORAL
Qty: 60 TABLET | Refills: 2 | Status: SHIPPED | OUTPATIENT
Start: 2020-09-11 | End: 2021-02-17

## 2020-09-11 NOTE — TELEPHONE ENCOUNTER
Requested Prescriptions     Pending Prescriptions Disp Refills   • AMITRIPTYLINE HCL 10 MG Oral Tab [Pharmacy Med Name: AMITRIPTYLINE 10MG TABLETS] 60 tablet 0     Sig: TAKE 1 TO 2 TABLETS(10 TO 20 MG) BY MOUTH EVERY NIGHT     Last fill 8/3/20 60 tabs 0 re

## 2020-09-21 DIAGNOSIS — F33.40 DEPRESSION, MAJOR, RECURRENT, IN REMISSION (HCC): ICD-10-CM

## 2020-09-21 DIAGNOSIS — F41.1 GAD (GENERALIZED ANXIETY DISORDER): ICD-10-CM

## 2020-09-21 RX ORDER — FLUOXETINE HYDROCHLORIDE 40 MG/1
CAPSULE ORAL
Qty: 90 CAPSULE | Refills: 1 | OUTPATIENT
Start: 2020-09-21

## 2020-10-17 ENCOUNTER — OFFICE VISIT (OUTPATIENT)
Dept: FAMILY MEDICINE CLINIC | Facility: CLINIC | Age: 40
End: 2020-10-17
Payer: COMMERCIAL

## 2020-10-17 VITALS
BODY MASS INDEX: 36.77 KG/M2 | SYSTOLIC BLOOD PRESSURE: 112 MMHG | OXYGEN SATURATION: 99 % | HEIGHT: 62 IN | DIASTOLIC BLOOD PRESSURE: 72 MMHG | TEMPERATURE: 98 F | RESPIRATION RATE: 20 BRPM | HEART RATE: 91 BPM | WEIGHT: 199.81 LBS

## 2020-10-17 DIAGNOSIS — H69.83 DYSFUNCTION OF BOTH EUSTACHIAN TUBES: Primary | ICD-10-CM

## 2020-10-17 PROCEDURE — 3078F DIAST BP <80 MM HG: CPT | Performed by: NURSE PRACTITIONER

## 2020-10-17 PROCEDURE — 3074F SYST BP LT 130 MM HG: CPT | Performed by: NURSE PRACTITIONER

## 2020-10-17 PROCEDURE — 3008F BODY MASS INDEX DOCD: CPT | Performed by: NURSE PRACTITIONER

## 2020-10-17 PROCEDURE — 99213 OFFICE O/P EST LOW 20 MIN: CPT | Performed by: NURSE PRACTITIONER

## 2020-10-17 NOTE — PROGRESS NOTES
Patient presents with:  Ear Problem: left ear pain, left side face pain, x3days       Ramin Ying is a 36year old female who presents for left ear fullness sensation and sharp shooting pain that radiated to nose and back of head.  Problem last  3 • Diabetes Maternal Grandmother         DM   • Thyroid Disorder Maternal Grandmother    • Cancer Maternal Grandmother         brain cancer   • Other (Emphysema[other]) Paternal Grandmother    • Cancer Maternal Grandfather         lymphoma   • Heart Disor bilaterally.   CARDIO: RRR without murmur      ASSESSMENT AND PLAN:   Isaiah Brewster is a 36year old female who presents with:      Dysfunction of both eustachian tubes  (primary encounter diagnosis)    Meds & Refills for this Visit:  Requested Pres

## 2020-10-17 NOTE — PATIENT INSTRUCTIONS
Common Middle Ear Problems  Middle ear problems may be caused by something that occurs at birth or right after birth (congenital). This may be an inherited condition.  Or it may be due to medicines or to a viral infection such as hepatitis, HIV, syphilis, This type of hearing loss can often be treated with medicine or surgery. Sensorineural hearing loss  This is the most common type of lifelong hearing loss. It occurs after damage to the inner ear.  It can also occur when there are problems with the nerves

## 2020-11-13 ENCOUNTER — LAB ENCOUNTER (OUTPATIENT)
Dept: LAB | Facility: HOSPITAL | Age: 40
End: 2020-11-13
Attending: FAMILY MEDICINE
Payer: COMMERCIAL

## 2020-11-13 DIAGNOSIS — Z20.822 EXPOSURE TO COVID-19 VIRUS: Primary | ICD-10-CM

## 2020-11-16 ENCOUNTER — PATIENT MESSAGE (OUTPATIENT)
Dept: FAMILY MEDICINE CLINIC | Facility: CLINIC | Age: 40
End: 2020-11-16

## 2020-11-16 RX ORDER — FLUOXETINE HYDROCHLORIDE 60 MG/1
60 TABLET, FILM COATED ORAL; ORAL DAILY
Qty: 90 TABLET | Refills: 1 | Status: SHIPPED | OUTPATIENT
Start: 2020-11-16 | End: 2020-11-19

## 2020-11-16 NOTE — TELEPHONE ENCOUNTER
From: Mony Samuels  To: Kaiser Daly PA-C  Sent: 11/16/2020 2:20 PM CST  Subject: Prescription Question    Hi [facility] Staff,  I forgot to ask, can you send a script to [facility] for me to get a flu shot?   Thanks,  Deshaun Truong

## 2020-11-16 NOTE — TELEPHONE ENCOUNTER
Received a verbal order per Anthony Padilla to order fluoxetine 60mg. Order pended for signature d/t warning with amitriptyline.

## 2020-11-17 PROBLEM — F33.0 DEPRESSION, MAJOR, RECURRENT, MILD: Status: RESOLVED | Noted: 2018-03-24 | Resolved: 2020-11-17

## 2020-11-17 PROBLEM — F33.0 DEPRESSION, MAJOR, RECURRENT, MILD (HCC): Status: RESOLVED | Noted: 2018-03-24 | Resolved: 2020-11-17

## 2020-11-17 NOTE — PROGRESS NOTES
Stefanie Damon is a 36year old female. HPI:   Please note that the following visit was completed using two-way, real-time interactive audio and video communication.   This has been done in good shahram to provide continuity of care in the best intere Patient plans on coming back in for complete physical will affiliate labs with her wellness exam.  Her PHQ 9 is at a 1.     PHQ9 FLOWSHEET 11/16/2020   Little interest or pleasure in doing things 0   Feeling down, depressed, or hopeless 0   Trouble falling Pancreatitis 2000      Social History:  Social History    Tobacco Use      Smoking status: Never Smoker      Smokeless tobacco: Never Used    Alcohol use:  Yes      Alcohol/week: 0.0 - 2.0 standard drinks      Frequency: 2-4 times a month      Drinks per se Depression, major, recurrent, in remission [facility])  Prescription for Prozac 60 mg tablets called into pharmacy #90 with 1 refill. Patient does not have any interactions with the fluoxetine and amitriptyline.   Takes them at a different time and has not notic

## 2020-11-19 NOTE — TELEPHONE ENCOUNTER
Health plan will not cover Fluoxetine 60mg tablets. Per Pharmacy they filled the Fluoxetine 20mg CAPSULES at three per day and they did cover that.  Health plan prefers capsules and there are not 60 mg caps available  Script updated

## 2020-11-30 ENCOUNTER — PATIENT MESSAGE (OUTPATIENT)
Dept: FAMILY MEDICINE CLINIC | Facility: CLINIC | Age: 40
End: 2020-11-30

## 2020-11-30 NOTE — TELEPHONE ENCOUNTER
From: Amanda Locke  To: Ludmila Boogie PA-C  Sent: 11/30/2020 2:24 PM CST  Subject: Non-Urgent Medical Question    Hi Anya Nguyen tested positive for COVID last weekend. Can you please tell us the first day he can go back to work?   He began

## 2020-12-10 DIAGNOSIS — G43.019 INTRACTABLE MIGRAINE WITHOUT AURA AND WITHOUT STATUS MIGRAINOSUS: ICD-10-CM

## 2020-12-10 RX ORDER — RIZATRIPTAN BENZOATE 10 MG/1
TABLET ORAL
Qty: 12 TABLET | Refills: 1 | Status: SHIPPED | OUTPATIENT
Start: 2020-12-10 | End: 2021-02-16

## 2020-12-10 NOTE — TELEPHONE ENCOUNTER
Requested Prescriptions     Pending Prescriptions Disp Refills   • RIZATRIPTAN BENZOATE 10 MG Oral Tab [Pharmacy Med Name: RIZATRIPTAN 10 MG TABLET] 12 tablet 1     Sig: TAKE 1 TABLET BY MOUTH AT ONSET OF MIGRAINE. MAY REPEAT ONCE 2 HOURS LATER     Last fi

## 2020-12-16 DIAGNOSIS — F41.1 GAD (GENERALIZED ANXIETY DISORDER): ICD-10-CM

## 2020-12-16 DIAGNOSIS — F33.40 DEPRESSION, MAJOR, RECURRENT, IN REMISSION (HCC): ICD-10-CM

## 2020-12-16 RX ORDER — FLUOXETINE HYDROCHLORIDE 20 MG/1
CAPSULE ORAL
Qty: 90 CAPSULE | Refills: 0 | OUTPATIENT
Start: 2020-12-16

## 2020-12-16 NOTE — TELEPHONE ENCOUNTER
Requested Prescriptions     Pending Prescriptions Disp Refills   • FLUOXETINE HCL 20 MG Oral Cap [Pharmacy Med Name: FLUOXETINE 20MG CAPSULES] 90 capsule 0     Sig: TAKE 3 CAPSULES(60 MG) BY MOUTH DAILY     Last fill 11/19/20 for 3 months. Too soon.

## 2020-12-29 ENCOUNTER — APPOINTMENT (OUTPATIENT)
Dept: GENERAL RADIOLOGY | Age: 40
End: 2020-12-29
Attending: EMERGENCY MEDICINE
Payer: COMMERCIAL

## 2020-12-29 ENCOUNTER — HOSPITAL ENCOUNTER (EMERGENCY)
Age: 40
Discharge: HOME | End: 2020-12-29
Attending: EMERGENCY MEDICINE
Payer: COMMERCIAL

## 2020-12-29 VITALS
TEMPERATURE: 98 F | BODY MASS INDEX: 36.07 KG/M2 | RESPIRATION RATE: 18 BRPM | DIASTOLIC BLOOD PRESSURE: 61 MMHG | SYSTOLIC BLOOD PRESSURE: 116 MMHG | HEIGHT: 62 IN | HEART RATE: 91 BPM | OXYGEN SATURATION: 98 % | WEIGHT: 196 LBS

## 2020-12-29 DIAGNOSIS — S39.012A STRAIN OF LUMBAR REGION, INITIAL ENCOUNTER: ICD-10-CM
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
## 2020-12-29 PROCEDURE — 72050 X-RAY EXAM NECK SPINE 4/5VWS: CPT | Performed by: EMERGENCY MEDICINE

## 2020-12-29 PROCEDURE — 96372 THER/PROPH/DIAG INJ SC/IM: CPT

## 2020-12-29 PROCEDURE — 99284 EMERGENCY DEPT VISIT MOD MDM: CPT

## 2020-12-29 PROCEDURE — 73502 X-RAY EXAM HIP UNI 2-3 VIEWS: CPT | Performed by: EMERGENCY MEDICINE

## 2020-12-29 PROCEDURE — 72110 X-RAY EXAM L-2 SPINE 4/>VWS: CPT | Performed by: EMERGENCY MEDICINE

## 2020-12-29 RX ORDER — KETOROLAC TROMETHAMINE 30 MG/ML
60 INJECTION, SOLUTION INTRAMUSCULAR; INTRAVENOUS ONCE
Status: COMPLETED | OUTPATIENT
Start: 2020-12-29 | End: 2020-12-29

## 2020-12-29 RX ORDER — METHOCARBAMOL 500 MG/1
500 TABLET, FILM COATED ORAL 4 TIMES DAILY
Qty: 12 TABLET | Refills: 0 | Status: SHIPPED | OUTPATIENT
Start: 2020-12-29 | End: 2021-01-04
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
## 2020-12-30 ENCOUNTER — OFFICE VISIT (OUTPATIENT)
Dept: FAMILY MEDICINE CLINIC | Facility: CLINIC | Age: 40
End: 2020-12-30
Payer: COMMERCIAL

## 2020-12-30 VITALS
SYSTOLIC BLOOD PRESSURE: 88 MMHG | BODY MASS INDEX: 36.16 KG/M2 | HEIGHT: 62.36 IN | WEIGHT: 199 LBS | DIASTOLIC BLOOD PRESSURE: 60 MMHG | TEMPERATURE: 97 F | HEART RATE: 84 BPM

## 2020-12-30 DIAGNOSIS — Z23 NEED FOR VACCINATION: ICD-10-CM

## 2020-12-30 DIAGNOSIS — S16.1XXA ACUTE STRAIN OF NECK MUSCLE, INITIAL ENCOUNTER: ICD-10-CM
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
## 2020-12-30 DIAGNOSIS — M54.42 ACUTE LEFT-SIDED LOW BACK PAIN WITH LEFT-SIDED SCIATICA: Primary | ICD-10-CM

## 2020-12-30 PROCEDURE — 99214 OFFICE O/P EST MOD 30 MIN: CPT | Performed by: FAMILY MEDICINE

## 2020-12-30 PROCEDURE — 3074F SYST BP LT 130 MM HG: CPT | Performed by: FAMILY MEDICINE

## 2020-12-30 PROCEDURE — 3008F BODY MASS INDEX DOCD: CPT | Performed by: FAMILY MEDICINE

## 2020-12-30 PROCEDURE — 3078F DIAST BP <80 MM HG: CPT | Performed by: FAMILY MEDICINE

## 2020-12-30 PROCEDURE — 90471 IMMUNIZATION ADMIN: CPT | Performed by: FAMILY MEDICINE

## 2020-12-30 PROCEDURE — 90686 IIV4 VACC NO PRSV 0.5 ML IM: CPT | Performed by: FAMILY MEDICINE

## 2020-12-30 RX ORDER — ACETAMINOPHEN AND CODEINE PHOSPHATE 300; 30 MG/1; MG/1
1-2 TABLET ORAL EVERY 6 HOURS PRN
Qty: 20 TABLET | Refills: 0 | Status: SHIPPED | OUTPATIENT
Start: 2020-12-30 | End: 2021-01-09

## 2020-12-30 RX ORDER — FLUTICASONE PROPIONATE 50 MCG
1 SPRAY, SUSPENSION (ML) NASAL DAILY PRN
COMMUNITY
Start: 2020-11-29

## 2020-12-30 RX ORDER — TIZANIDINE 2 MG/1
TABLET ORAL EVERY 6 HOURS PRN
Qty: 30 TABLET | Refills: 0 | Status: SHIPPED | OUTPATIENT
Start: 2020-12-30 | End: 2021-01-09

## 2020-12-30 RX ORDER — METHYLPREDNISOLONE 4 MG/1
TABLET ORAL
Qty: 1 KIT | Refills: 0 | Status: SHIPPED | OUTPATIENT
Start: 2020-12-30 | End: 2021-03-17 | Stop reason: ALTCHOICE
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
## 2020-12-31 PROBLEM — S16.1XXA ACUTE STRAIN OF NECK MUSCLE: Status: ACTIVE | Noted: 2020-12-31

## 2020-12-31 PROBLEM — M54.42 ACUTE LEFT-SIDED LOW BACK PAIN WITH LEFT-SIDED SCIATICA: Status: ACTIVE | Noted: 2020-12-31
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
## 2021-01-04 ENCOUNTER — TELEMEDICINE (OUTPATIENT)
Dept: FAMILY MEDICINE CLINIC | Facility: CLINIC | Age: 41
End: 2021-01-04

## 2021-01-04 DIAGNOSIS — M54.42 ACUTE LEFT-SIDED LOW BACK PAIN WITH LEFT-SIDED SCIATICA: ICD-10-CM
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
## 2021-01-04 PROCEDURE — 99213 OFFICE O/P EST LOW 20 MIN: CPT | Performed by: FAMILY MEDICINE

## 2021-01-04 NOTE — PROGRESS NOTES
Demetri Lincoln is a 36year old female. HPI:   Please note that the following visit was completed using two-way, real-time interactive audio and video communication.   This has been done in good shahram to provide continuity of care in the best intere gotten better sleep since Wednesday with the muscle relaxant. Has not offered a prescription left may need a refill by the end of the week.   Current Outpatient Medications   Medication Sig Dispense Refill   • Fluticasone Propionate 50 MCG/ACT Nasal Suspen shortness of breath with exertion  CARDIOVASCULAR: denies chest pain on exertion  GI: denies abdominal pain and denies heartburn  NEURO: Had a migraine over the weekend secondary to weather changes did resolve  Musculoskeletal: see above  EXAM:   No vitals needed.

## 2021-01-06 ENCOUNTER — PATIENT MESSAGE (OUTPATIENT)
Dept: FAMILY MEDICINE CLINIC | Facility: CLINIC | Age: 41
End: 2021-01-06

## 2021-01-06 ENCOUNTER — TELEPHONE (OUTPATIENT)
Dept: FAMILY MEDICINE CLINIC | Facility: CLINIC | Age: 41
End: 2021-01-06

## 2021-01-06 NOTE — TELEPHONE ENCOUNTER
From: Simón Archibald  To: Christofer Lucas PA-C  Sent: 1/6/2021 4:07 PM CST  Subject: Visit Follow-up Question    Lisa Coronado,  I received a call that the MRI was not approved. Can I try getting approval for a CT scan?  As of last night, I am right

## 2021-01-06 NOTE — TELEPHONE ENCOUNTER
From: Charles Villegas  To: Titus Adame PA-C  Sent: 1/6/2021  4:07 PM CST  Subject: Visit Follow-up Question    Desi Chapman,  I received a call that the MRI was not approved. Can I try getting approval for a CT scan?   As of last night, I am righ

## 2021-01-06 NOTE — TELEPHONE ENCOUNTER
MRI SPINE LUMBAR, Denied    Saadia Palomares [facility] Clinical Staff             Good Morning,     Please see below as this case was not approved by the health plan.  At this time, I will be closing this case.     Thank you   Duane Hatfield     We received your reque [employer] and [employer]. are licensed or certified utilization review entities. guidelines needed to reach this decision.  We found the service requested is not medically necessary   in your case:   Based on [employer]

## 2021-01-07 ENCOUNTER — PATIENT MESSAGE (OUTPATIENT)
Dept: FAMILY MEDICINE CLINIC | Facility: CLINIC | Age: 41
End: 2021-01-07

## 2021-01-07 NOTE — TELEPHONE ENCOUNTER
From: Baljit Merchant  To: Trice Verdin PA-C  Sent: 1/7/2021 7:52 AM CST  Subject: Other    Regarding insurance. ..supposedly I have to go through my health insurance first and then they go back to [employer] to get their money back for what they p

## 2021-01-07 NOTE — TELEPHONE ENCOUNTER
For case #[id number] a reconsideration filing is being placed will take 2 business days and reconsideration will be faxed to us.   There is a good chance that it will be denied again secondary to the classic expectation of  6-week of therapy before MRI appro
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
## 2021-01-07 NOTE — TELEPHONE ENCOUNTER
Will as provider on further recommendations.      Information for a peer to peer @ [phone] option 1   [id number] Magnetic Resonance Imaging (MRI)

## 2021-01-09 DIAGNOSIS — M54.42 ACUTE LEFT-SIDED LOW BACK PAIN WITH LEFT-SIDED SCIATICA: ICD-10-CM

## 2021-01-11 DIAGNOSIS — M54.42 ACUTE LEFT-SIDED LOW BACK PAIN WITH LEFT-SIDED SCIATICA: ICD-10-CM

## 2021-01-11 RX ORDER — ACETAMINOPHEN AND CODEINE PHOSPHATE 300; 30 MG/1; MG/1
1-2 TABLET ORAL EVERY 6 HOURS PRN
Qty: 20 TABLET | Refills: 0 | Status: CANCELLED | OUTPATIENT
Start: 2021-01-11

## 2021-01-11 RX ORDER — ACETAMINOPHEN AND CODEINE PHOSPHATE 300; 30 MG/1; MG/1
1-2 TABLET ORAL EVERY 6 HOURS PRN
Qty: 20 TABLET | Refills: 0 | Status: SHIPPED | OUTPATIENT
Start: 2021-01-11 | End: 2021-03-15

## 2021-01-11 RX ORDER — TIZANIDINE 2 MG/1
TABLET ORAL EVERY 6 HOURS PRN
Qty: 30 TABLET | Refills: 0 | Status: CANCELLED | OUTPATIENT
Start: 2021-01-11

## 2021-01-11 RX ORDER — TIZANIDINE 2 MG/1
TABLET ORAL EVERY 6 HOURS PRN
Qty: 30 TABLET | Refills: 0 | Status: SHIPPED | OUTPATIENT
Start: 2021-01-11 | End: 2021-03-15

## 2021-01-11 NOTE — TELEPHONE ENCOUNTER
ACETAMINOPHEN-CODEINE 12/30/2020 12/30/2020 300-30 20 tablet      TIZANIDINE 2MG TABLETS 12/30/2020 12/30/2020  30 each  2     LOV 1/4/21  The patient is asked to return as needed

## 2021-01-20 DIAGNOSIS — F33.40 DEPRESSION, MAJOR, RECURRENT, IN REMISSION (HCC): ICD-10-CM

## 2021-01-20 DIAGNOSIS — F41.1 GAD (GENERALIZED ANXIETY DISORDER): ICD-10-CM

## 2021-01-20 RX ORDER — FLUOXETINE HYDROCHLORIDE 20 MG/1
60 CAPSULE ORAL DAILY
Qty: 270 CAPSULE | Refills: 0 | OUTPATIENT
Start: 2021-01-20

## 2021-01-20 NOTE — TELEPHONE ENCOUNTER
Requested Prescriptions     Pending Prescriptions Disp Refills   • FLUoxetine HCl 20 MG Oral Cap 270 capsule 0     Sig: Take 3 capsules (60 mg total) by mouth daily. Last fill was 11/19/20 270 caps. Refill too soon.

## 2021-01-29 DIAGNOSIS — F41.1 GAD (GENERALIZED ANXIETY DISORDER): ICD-10-CM

## 2021-01-29 DIAGNOSIS — F33.40 DEPRESSION, MAJOR, RECURRENT, IN REMISSION (HCC): ICD-10-CM

## 2021-01-29 RX ORDER — FLUOXETINE HYDROCHLORIDE 20 MG/1
60 CAPSULE ORAL DAILY
Qty: 270 CAPSULE | Refills: 0 | Status: SHIPPED | OUTPATIENT
Start: 2021-01-29 | End: 2021-06-29

## 2021-01-29 NOTE — TELEPHONE ENCOUNTER
Requested Prescriptions     Pending Prescriptions Disp Refills   • FLUoxetine HCl 20 MG Oral Cap 270 capsule 0     Sig: Take 3 capsules (60 mg total) by mouth daily.      Last filled 11/19/20 270 caps 0 refills  Last OV 1/4/21 acute visit  No future OV

## 2021-02-15 ENCOUNTER — TELEPHONE (OUTPATIENT)
Dept: FAMILY MEDICINE CLINIC | Facility: CLINIC | Age: 41
End: 2021-02-15

## 2021-02-15 DIAGNOSIS — M48.061 FORAMINAL STENOSIS OF LUMBAR REGION: ICD-10-CM

## 2021-02-15 DIAGNOSIS — M48.00 CENTRAL STENOSIS OF SPINAL CANAL: Primary | ICD-10-CM

## 2021-02-15 NOTE — TELEPHONE ENCOUNTER
Per Jack Ward PA-C, the patient was informed that her MRI showed L4L5, L2L3, L3L4,: mild central canal stenosis and bilateral foraminal narrowing. She was referred to Bill Jones M.D. at the [facility].

## 2021-02-16 ENCOUNTER — TELEPHONE (OUTPATIENT)
Dept: SURGERY | Facility: CLINIC | Age: 41
End: 2021-02-16

## 2021-02-16 DIAGNOSIS — G43.019 INTRACTABLE MIGRAINE WITHOUT AURA AND WITHOUT STATUS MIGRAINOSUS: ICD-10-CM

## 2021-02-16 RX ORDER — RIZATRIPTAN BENZOATE 10 MG/1
TABLET ORAL
Qty: 12 TABLET | Refills: 0 | Status: SHIPPED | OUTPATIENT
Start: 2021-02-16 | End: 2021-03-17

## 2021-02-16 NOTE — TELEPHONE ENCOUNTER
Rcvd report only from [facility] of MRI Lumbar Spine dated 2/10/21. Endorsed to [address] [place] [address], pt has apt scheduled for 3/2/21.

## 2021-02-16 NOTE — TELEPHONE ENCOUNTER
Per Ave Miller PA-C, the patient's MRI results were successfully faxed to the [facility] for Dr. Abdifatah Motta to review.

## 2021-02-16 NOTE — TELEPHONE ENCOUNTER
Referral was given for neurologist in the past,  have patient make appointment with neurology since she uses about 12 rizatriptan per month. Refill of #12 sent to pharmacy.

## 2021-02-17 DIAGNOSIS — G43.019 INTRACTABLE MIGRAINE WITHOUT AURA AND WITHOUT STATUS MIGRAINOSUS: ICD-10-CM

## 2021-02-17 RX ORDER — AMITRIPTYLINE HYDROCHLORIDE 10 MG/1
TABLET, FILM COATED ORAL NIGHTLY
Qty: 60 TABLET | Refills: 2 | Status: SHIPPED | OUTPATIENT
Start: 2021-02-17 | End: 2021-05-10

## 2021-02-17 NOTE — TELEPHONE ENCOUNTER
Requested Prescriptions     Pending Prescriptions Disp Refills   • Amitriptyline HCl 10 MG Oral Tab 60 tablet 2     Sig: Take 1-2 tablets (10-20 mg total) by mouth nightly.      Last fill was 9/11/20 60 tabs 2 refills  Last OV 1/4/21  Future OV 3/3/21

## 2021-02-18 ENCOUNTER — MED REC SCAN ONLY (OUTPATIENT)
Dept: FAMILY MEDICINE CLINIC | Facility: CLINIC | Age: 41
End: 2021-02-18

## 2021-03-15 DIAGNOSIS — M54.42 ACUTE LEFT-SIDED LOW BACK PAIN WITH LEFT-SIDED SCIATICA: ICD-10-CM

## 2021-03-15 PROBLEM — E66.9 OBESITY (BMI 35.0-39.9 WITHOUT COMORBIDITY): Status: ACTIVE | Noted: 2021-03-15

## 2021-03-15 PROBLEM — N39.3 SUI (STRESS URINARY INCONTINENCE, FEMALE): Status: ACTIVE | Noted: 2021-03-15

## 2021-03-15 PROBLEM — N36.41 URETHRAL HYPERMOBILITY: Status: ACTIVE | Noted: 2021-03-15

## 2021-03-15 PROCEDURE — 87086 URINE CULTURE/COLONY COUNT: CPT | Performed by: OBSTETRICS & GYNECOLOGY

## 2021-03-15 RX ORDER — TIZANIDINE 2 MG/1
TABLET ORAL EVERY 6 HOURS PRN
Qty: 15 TABLET | Refills: 0 | Status: SHIPPED | OUTPATIENT
Start: 2021-03-15 | End: 2021-04-08

## 2021-03-15 RX ORDER — ACETAMINOPHEN AND CODEINE PHOSPHATE 300; 30 MG/1; MG/1
1-2 TABLET ORAL EVERY 6 HOURS PRN
Qty: 10 TABLET | Refills: 0 | Status: SHIPPED
Start: 2021-03-15 | End: 2021-03-16

## 2021-03-15 NOTE — TELEPHONE ENCOUNTER
ACETAMINOPHEN-CODEINE 01/11/2021 01/11/2021 300-30 20 tablet  2 KENDRICK CRAWLEY     TIZANIDINE   TAB 2MG 01/11/2021   30 Undefined  2     LOV 1/4/21. The patient is asked to return as needed.     Has future OV 3/17/21

## 2021-03-16 RX ORDER — ACETAMINOPHEN AND CODEINE PHOSPHATE 300; 30 MG/1; MG/1
1-2 TABLET ORAL EVERY 6 HOURS PRN
Qty: 10 TABLET | Refills: 0 | OUTPATIENT
Start: 2021-03-16 | End: 2021-04-08

## 2021-03-16 NOTE — TELEPHONE ENCOUNTER
The Acetaminophen-Codeine 300-30 MG Oral prescription that was attempted to be sent yesterday failed and was called in to the [facility] in [place] today instead.

## 2021-03-17 ENCOUNTER — TELEMEDICINE (OUTPATIENT)
Dept: FAMILY MEDICINE CLINIC | Facility: CLINIC | Age: 41
End: 2021-03-17

## 2021-03-17 DIAGNOSIS — G43.019 INTRACTABLE MIGRAINE WITHOUT AURA AND WITHOUT STATUS MIGRAINOSUS: Primary | ICD-10-CM

## 2021-03-17 DIAGNOSIS — F33.40 DEPRESSION, MAJOR, RECURRENT, IN REMISSION (HCC): ICD-10-CM

## 2021-03-17 DIAGNOSIS — M54.42 ACUTE LEFT-SIDED LOW BACK PAIN WITH LEFT-SIDED SCIATICA: ICD-10-CM

## 2021-03-17 DIAGNOSIS — Z79.899 MEDICATION MANAGEMENT: ICD-10-CM

## 2021-03-17 DIAGNOSIS — M48.061 SPINAL STENOSIS OF LUMBAR REGION WITH RADICULOPATHY: ICD-10-CM

## 2021-03-17 DIAGNOSIS — M54.16 SPINAL STENOSIS OF LUMBAR REGION WITH RADICULOPATHY: ICD-10-CM

## 2021-03-17 PROCEDURE — 99213 OFFICE O/P EST LOW 20 MIN: CPT | Performed by: FAMILY MEDICINE

## 2021-03-17 RX ORDER — METHYLPREDNISOLONE 4 MG/1
TABLET ORAL
Qty: 1 KIT | Refills: 0 | Status: SHIPPED | OUTPATIENT
Start: 2021-03-17 | End: 2021-09-28 | Stop reason: ALTCHOICE

## 2021-03-17 RX ORDER — RIZATRIPTAN BENZOATE 10 MG/1
10 TABLET ORAL AS NEEDED
Qty: 12 TABLET | Refills: 0 | Status: SHIPPED | OUTPATIENT
Start: 2021-03-17 | End: 2021-05-04

## 2021-03-18 PROBLEM — M54.16 SPINAL STENOSIS OF LUMBAR REGION WITH RADICULOPATHY: Status: ACTIVE | Noted: 2021-03-18

## 2021-03-18 PROBLEM — M48.061 SPINAL STENOSIS OF LUMBAR REGION WITH RADICULOPATHY: Status: ACTIVE | Noted: 2021-03-18
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
## 2021-04-08 DIAGNOSIS — M54.42 ACUTE LEFT-SIDED LOW BACK PAIN WITH LEFT-SIDED SCIATICA: ICD-10-CM

## 2021-04-09 RX ORDER — ACETAMINOPHEN AND CODEINE PHOSPHATE 300; 30 MG/1; MG/1
1-2 TABLET ORAL EVERY 6 HOURS PRN
Qty: 10 TABLET | Refills: 0 | Status: SHIPPED | OUTPATIENT
Start: 2021-04-09 | End: 2021-05-04

## 2021-04-09 RX ORDER — TIZANIDINE 2 MG/1
TABLET ORAL EVERY 6 HOURS PRN
Qty: 15 TABLET | Refills: 0 | Status: SHIPPED | OUTPATIENT
Start: 2021-04-09 | End: 2021-05-04

## 2021-04-09 NOTE — TELEPHONE ENCOUNTER
Requested Prescriptions     Pending Prescriptions Disp Refills   • tiZANidine HCl 2 MG Oral Tab 15 tablet 0     Sig: Take 1-3 tablets (2-6 mg total) by mouth every 6 (six) hours as needed.    • Acetaminophen-Codeine 300-30 MG Oral Tab 10 tablet 0     Sig: T

## 2021-05-04 DIAGNOSIS — G43.019 INTRACTABLE MIGRAINE WITHOUT AURA AND WITHOUT STATUS MIGRAINOSUS: ICD-10-CM

## 2021-05-04 DIAGNOSIS — M54.42 ACUTE LEFT-SIDED LOW BACK PAIN WITH LEFT-SIDED SCIATICA: ICD-10-CM

## 2021-05-04 RX ORDER — ACETAMINOPHEN AND CODEINE PHOSPHATE 300; 30 MG/1; MG/1
1-2 TABLET ORAL EVERY 6 HOURS PRN
Qty: 10 TABLET | Refills: 0 | Status: SHIPPED | OUTPATIENT
Start: 2021-05-04 | End: 2021-06-29

## 2021-05-04 RX ORDER — RIZATRIPTAN BENZOATE 10 MG/1
10 TABLET ORAL AS NEEDED
Qty: 12 TABLET | Refills: 0 | Status: SHIPPED | OUTPATIENT
Start: 2021-05-04 | End: 2021-06-29

## 2021-05-04 RX ORDER — TIZANIDINE 2 MG/1
TABLET ORAL EVERY 6 HOURS PRN
Qty: 15 TABLET | Refills: 0 | Status: SHIPPED | OUTPATIENT
Start: 2021-05-04 | End: 2021-06-29

## 2021-05-04 NOTE — TELEPHONE ENCOUNTER
ACETAMINOPHEN-CODEINE 01/11/2021 01/11/2021 300-30 20 tablet  2     TIZANIDINE   TAB 2MG 03/16/2021  2 15 Undefined  1     RIZATRIPTAN  TAB 10MG 02/16/2021   12 Undefined  6     LOV Telemed 3/17/21.  The patient is asked to return 3 months or as needed  No

## 2021-05-08 DIAGNOSIS — G43.019 INTRACTABLE MIGRAINE WITHOUT AURA AND WITHOUT STATUS MIGRAINOSUS: ICD-10-CM

## 2021-05-10 RX ORDER — AMITRIPTYLINE HYDROCHLORIDE 10 MG/1
TABLET, FILM COATED ORAL NIGHTLY
Qty: 60 TABLET | Refills: 0 | Status: SHIPPED | OUTPATIENT
Start: 2021-05-10 | End: 2021-06-04

## 2021-05-10 NOTE — TELEPHONE ENCOUNTER
Amitriptyline HCl 10 MG Oral Tab 60 tablet 2 2/17/2021     LOV 03/17/21.  Follow up in 3 months    No future OV

## 2021-05-12 ENCOUNTER — TELEPHONE (OUTPATIENT)
Dept: NEUROLOGY | Facility: CLINIC | Age: 41
End: 2021-05-12

## 2021-05-19 DIAGNOSIS — F41.1 GAD (GENERALIZED ANXIETY DISORDER): ICD-10-CM

## 2021-05-20 RX ORDER — ALPRAZOLAM 0.5 MG/1
TABLET ORAL
Qty: 20 TABLET | Refills: 0 | Status: SHIPPED | OUTPATIENT
Start: 2021-05-20 | End: 2021-12-24

## 2021-05-20 NOTE — TELEPHONE ENCOUNTER
Requested Prescriptions     Pending Prescriptions Disp Refills   • ALPRAZolam 0.5 MG Oral Tab 30 tablet 0     Sig: TAKE 1 TABLET BY MOUTH TWICE A DAY AS NEEDED     Last fill was 7/13/20 30 tabs 0 refill  Last OV 3/17/21  The patient is asked to return 3 mo

## 2021-06-04 DIAGNOSIS — G43.019 INTRACTABLE MIGRAINE WITHOUT AURA AND WITHOUT STATUS MIGRAINOSUS: ICD-10-CM

## 2021-06-04 RX ORDER — AMITRIPTYLINE HYDROCHLORIDE 10 MG/1
TABLET, FILM COATED ORAL
Qty: 60 TABLET | Refills: 0 | Status: SHIPPED | OUTPATIENT
Start: 2021-06-04 | End: 2021-08-23

## 2021-06-04 NOTE — TELEPHONE ENCOUNTER
Requested Prescriptions     Pending Prescriptions Disp Refills   • AMITRIPTYLINE HCL 10 MG Oral Tab [Pharmacy Med Name: AMITRIPTYLINE 10MG TABLETS] 60 tablet 0     Sig: TAKE 1 TO 2 TABLETS(10 TO 20 MG) BY MOUTH EVERY NIGHT     Last fill was 5/10/21 60 tabs

## 2021-06-29 DIAGNOSIS — G43.019 INTRACTABLE MIGRAINE WITHOUT AURA AND WITHOUT STATUS MIGRAINOSUS: ICD-10-CM

## 2021-06-29 DIAGNOSIS — M54.42 ACUTE LEFT-SIDED LOW BACK PAIN WITH LEFT-SIDED SCIATICA: ICD-10-CM

## 2021-06-29 DIAGNOSIS — F41.1 GAD (GENERALIZED ANXIETY DISORDER): ICD-10-CM

## 2021-06-29 DIAGNOSIS — F33.40 DEPRESSION, MAJOR, RECURRENT, IN REMISSION (HCC): ICD-10-CM

## 2021-06-29 RX ORDER — RIZATRIPTAN BENZOATE 10 MG/1
10 TABLET ORAL AS NEEDED
Qty: 12 TABLET | Refills: 0 | Status: SHIPPED | OUTPATIENT
Start: 2021-06-29 | End: 2021-08-05

## 2021-06-29 RX ORDER — ACETAMINOPHEN AND CODEINE PHOSPHATE 300; 30 MG/1; MG/1
1-2 TABLET ORAL EVERY 6 HOURS PRN
Qty: 10 TABLET | Refills: 0 | Status: SHIPPED | OUTPATIENT
Start: 2021-06-29 | End: 2021-09-28

## 2021-06-29 RX ORDER — FLUOXETINE HYDROCHLORIDE 20 MG/1
CAPSULE ORAL
Qty: 270 CAPSULE | Refills: 0 | Status: SHIPPED | OUTPATIENT
Start: 2021-06-29 | End: 2021-11-08

## 2021-06-29 RX ORDER — TIZANIDINE 2 MG/1
TABLET ORAL EVERY 6 HOURS PRN
Qty: 15 TABLET | Refills: 0 | Status: SHIPPED | OUTPATIENT
Start: 2021-06-29 | End: 2021-09-28

## 2021-06-29 NOTE — TELEPHONE ENCOUNTER
Requested Prescriptions     Pending Prescriptions Disp Refills   • FLUOXETINE HCL 20 MG Oral Cap [Pharmacy Med Name: FLUOXETINE 20MG CAPSULES] 270 capsule 0     Sig: TAKE 3 CAPSULES(60 MG) BY MOUTH DAILY     Last fill was 1/29/21 270 caps 0 refill  Last OV

## 2021-06-29 NOTE — TELEPHONE ENCOUNTER
Requested Prescriptions     Pending Prescriptions Disp Refills   • Rizatriptan Benzoate 10 MG Oral Tab 12 tablet 0     Sig: Take 1 tablet (10 mg total) by mouth as needed for Migraine.  MAY REPEAT ONCE 2 HOURS LATER   • tiZANidine HCl 2 MG Oral Tab 15 table

## 2021-06-29 NOTE — TELEPHONE ENCOUNTER
She needs another follow-up office visit since we have been having to continue the codeine and tizanidine for a long time.

## 2021-08-04 DIAGNOSIS — G43.019 INTRACTABLE MIGRAINE WITHOUT AURA AND WITHOUT STATUS MIGRAINOSUS: ICD-10-CM

## 2021-08-05 RX ORDER — RIZATRIPTAN BENZOATE 10 MG/1
TABLET ORAL
Qty: 12 TABLET | Refills: 0 | Status: SHIPPED | OUTPATIENT
Start: 2021-08-05 | End: 2021-09-07

## 2021-08-05 NOTE — TELEPHONE ENCOUNTER
Requested Prescriptions     Pending Prescriptions Disp Refills   • RIZATRIPTAN BENZOATE 10 MG Oral Tab [Pharmacy Med Name: RIZATRIPTAN 10MG TABLETS] 12 tablet 0     Sig: TAKE 1 TABLET BY MOUTH AS NEEDED FOR MIGRAINE, MAY REPEAT ONCE 2 HOURS LATER     Last

## 2021-08-05 NOTE — TELEPHONE ENCOUNTER
Refill approved she also needs to make neurology appointment for chronic migraines. I see that she did have an appointment scheduled but it was canceled and not rescheduled.

## 2021-08-23 DIAGNOSIS — G43.019 INTRACTABLE MIGRAINE WITHOUT AURA AND WITHOUT STATUS MIGRAINOSUS: ICD-10-CM

## 2021-08-23 RX ORDER — AMITRIPTYLINE HYDROCHLORIDE 10 MG/1
TABLET, FILM COATED ORAL
Qty: 60 TABLET | Refills: 0 | Status: SHIPPED | OUTPATIENT
Start: 2021-08-23 | End: 2021-09-28

## 2021-08-23 NOTE — TELEPHONE ENCOUNTER
AMITRIPTYLINE HCL 10 MG Oral Tab 60 tablet 0 6/4/2021    Sig:   TAKE 1 TO 2 TABLETS(10 TO 20 MG) BY MOUTH EVERY NIGHT       LOV 3/17/21 Telemed  The patient is asked to return 3 months or as needed    No future OV

## 2021-09-07 DIAGNOSIS — G43.019 INTRACTABLE MIGRAINE WITHOUT AURA AND WITHOUT STATUS MIGRAINOSUS: ICD-10-CM

## 2021-09-08 DIAGNOSIS — G43.019 INTRACTABLE MIGRAINE WITHOUT AURA AND WITHOUT STATUS MIGRAINOSUS: ICD-10-CM

## 2021-09-08 RX ORDER — RIZATRIPTAN BENZOATE 10 MG/1
TABLET ORAL
Qty: 12 TABLET | Refills: 0 | Status: SHIPPED | OUTPATIENT
Start: 2021-09-08 | End: 2021-09-28

## 2021-09-08 RX ORDER — RIZATRIPTAN BENZOATE 10 MG/1
TABLET ORAL
Qty: 12 TABLET | Refills: 0 | OUTPATIENT
Start: 2021-09-08

## 2021-09-08 NOTE — TELEPHONE ENCOUNTER
Requested Prescriptions     Pending Prescriptions Disp Refills   • Rizatriptan Benzoate 10 MG Oral Tab 12 tablet 0     Last fill was 8/5/21 12 tabs 0 refill  Last OV 3/17/21 virtual  NEURO - INTERNAL  1.  Intractable migraine without aura and without status

## 2021-09-08 NOTE — TELEPHONE ENCOUNTER
Requested Prescriptions     Refused Prescriptions Disp Refills   • RIZATRIPTAN BENZOATE 10 MG Oral Tab [Pharmacy Med Name: RIZATRIPTAN 10MG TABLETS] 12 tablet 0     Sig: TAKE 1 TABLET BY MOUTH AS NEEDED FOR MIGRAINE, MAY REPEAT ONCE 2 HOURS LATER     Refus

## 2021-09-28 ENCOUNTER — HOSPITAL ENCOUNTER (OUTPATIENT)
Dept: MAMMOGRAPHY | Facility: HOSPITAL | Age: 41
Discharge: HOME | End: 2021-09-28
Attending: FAMILY MEDICINE
Payer: COMMERCIAL

## 2021-09-28 ENCOUNTER — HOSPITAL ENCOUNTER (OUTPATIENT)
Dept: ULTRASOUND IMAGING | Age: 41
Discharge: HOME | End: 2021-09-28
Attending: FAMILY MEDICINE
Payer: COMMERCIAL

## 2021-09-28 ENCOUNTER — LAB ENCOUNTER (OUTPATIENT)
Dept: LAB | Age: 41
End: 2021-09-28
Attending: FAMILY MEDICINE
Payer: COMMERCIAL

## 2021-09-28 ENCOUNTER — TELEPHONE (OUTPATIENT)
Dept: NEUROLOGY | Facility: CLINIC | Age: 41
End: 2021-09-28

## 2021-09-28 DIAGNOSIS — Z13.220 LIPID SCREENING: ICD-10-CM

## 2021-09-28 DIAGNOSIS — N95.1 VASOMOTOR SYMPTOMS DUE TO MENOPAUSE: ICD-10-CM

## 2021-09-28 DIAGNOSIS — E04.2 MULTINODULAR GOITER: ICD-10-CM

## 2021-09-28 DIAGNOSIS — F41.1 GAD (GENERALIZED ANXIETY DISORDER): ICD-10-CM

## 2021-09-28 DIAGNOSIS — Z12.31 VISIT FOR SCREENING MAMMOGRAM: ICD-10-CM

## 2021-09-28 DIAGNOSIS — Z00.00 LABORATORY EXAMINATION ORDERED AS PART OF A ROUTINE GENERAL MEDICAL EXAMINATION: ICD-10-CM

## 2021-09-28 DIAGNOSIS — IMO0002 CHRONIC MIGRAINE: Primary | ICD-10-CM

## 2021-09-28 PROBLEM — G43.109 MIGRAINE WITH AURA AND WITHOUT STATUS MIGRAINOSUS, NOT INTRACTABLE: Status: ACTIVE | Noted: 2021-09-28

## 2021-09-28 LAB
ALBUMIN SERPL-MCNC: 3.2 G/DL (ref 3.4–5)
ALBUMIN/GLOB SERPL: 0.7 {RATIO} (ref 1–2)
ALP LIVER SERPL-CCNC: 107 U/L
ALT SERPL-CCNC: 21 U/L
ANION GAP SERPL CALC-SCNC: 4 MMOL/L (ref 0–18)
AST SERPL-CCNC: 16 U/L (ref 15–37)
BASOPHILS # BLD AUTO: 0.04 X10(3) UL (ref 0–0.2)
BASOPHILS NFR BLD AUTO: 0.5 %
BILIRUB SERPL-MCNC: 0.4 MG/DL (ref 0.1–2)
BUN BLD-MCNC: 18 MG/DL (ref 7–18)
CALCIUM BLD-MCNC: 8.9 MG/DL (ref 8.5–10.1)
CHLORIDE SERPL-SCNC: 110 MMOL/L (ref 98–112)
CHOLEST SERPL-MCNC: 207 MG/DL (ref ?–200)
CO2 SERPL-SCNC: 24 MMOL/L (ref 21–32)
CREAT BLD-MCNC: 0.92 MG/DL
EOSINOPHIL # BLD AUTO: 0.22 X10(3) UL (ref 0–0.7)
EOSINOPHIL NFR BLD AUTO: 2.6 %
ERYTHROCYTE [DISTWIDTH] IN BLOOD BY AUTOMATED COUNT: 12.5 %
FSH SERPL-ACNC: 1.6 MIU/ML
GLOBULIN PLAS-MCNC: 4.3 G/DL (ref 2.8–4.4)
GLUCOSE BLD-MCNC: 92 MG/DL (ref 70–99)
HCT VFR BLD AUTO: 43.3 %
HDLC SERPL-MCNC: 45 MG/DL (ref 40–59)
HGB BLD-MCNC: 14.5 G/DL
IMM GRANULOCYTES # BLD AUTO: 0.03 X10(3) UL (ref 0–1)
IMM GRANULOCYTES NFR BLD: 0.4 %
LDLC SERPL CALC-MCNC: 138 MG/DL (ref ?–100)
LYMPHOCYTES # BLD AUTO: 1.83 X10(3) UL (ref 1–4)
LYMPHOCYTES NFR BLD AUTO: 21.8 %
MCH RBC QN AUTO: 30.5 PG (ref 26–34)
MCHC RBC AUTO-ENTMCNC: 33.5 G/DL (ref 31–37)
MCV RBC AUTO: 91.2 FL
MONOCYTES # BLD AUTO: 0.6 X10(3) UL (ref 0.1–1)
MONOCYTES NFR BLD AUTO: 7.2 %
NEUTROPHILS # BLD AUTO: 5.67 X10 (3) UL (ref 1.5–7.7)
NEUTROPHILS # BLD AUTO: 5.67 X10(3) UL (ref 1.5–7.7)
NEUTROPHILS NFR BLD AUTO: 67.5 %
NONHDLC SERPL-MCNC: 162 MG/DL (ref ?–130)
OSMOLALITY SERPL CALC.SUM OF ELEC: 288 MOSM/KG (ref 275–295)
PATIENT FASTING Y/N/NP: NO
PATIENT FASTING Y/N/NP: NO
PLATELET # BLD AUTO: 339 10(3)UL (ref 150–450)
POTASSIUM SERPL-SCNC: 3.9 MMOL/L (ref 3.5–5.1)
PROT SERPL-MCNC: 7.5 G/DL (ref 6.4–8.2)
RBC # BLD AUTO: 4.75 X10(6)UL
SODIUM SERPL-SCNC: 138 MMOL/L (ref 136–145)
T4 FREE SERPL-MCNC: 0.9 NG/DL (ref 0.8–1.7)
TRIGL SERPL-MCNC: 135 MG/DL (ref 30–149)
TSI SER-ACNC: 0.64 MIU/ML (ref 0.36–3.74)
VLDLC SERPL CALC-MCNC: 25 MG/DL (ref 0–30)
WBC # BLD AUTO: 8.4 X10(3) UL (ref 4–11)

## 2021-09-28 PROCEDURE — 84439 ASSAY OF FREE THYROXINE: CPT

## 2021-09-28 PROCEDURE — 80061 LIPID PANEL: CPT

## 2021-09-28 PROCEDURE — 77067 SCR MAMMO BI INCL CAD: CPT | Performed by: FAMILY MEDICINE

## 2021-09-28 PROCEDURE — 76536 US EXAM OF HEAD AND NECK: CPT | Performed by: FAMILY MEDICINE

## 2021-09-28 PROCEDURE — 80053 COMPREHEN METABOLIC PANEL: CPT

## 2021-09-28 PROCEDURE — 85025 COMPLETE CBC W/AUTO DIFF WBC: CPT

## 2021-09-28 PROCEDURE — 83001 ASSAY OF GONADOTROPIN (FSH): CPT

## 2021-09-28 PROCEDURE — 84080 ASSAY ALKALINE PHOSPHATASES: CPT | Performed by: FAMILY MEDICINE

## 2021-09-28 PROCEDURE — 84075 ASSAY ALKALINE PHOSPHATASE: CPT | Performed by: FAMILY MEDICINE

## 2021-09-28 PROCEDURE — 77063 BREAST TOMOSYNTHESIS BI: CPT | Performed by: FAMILY MEDICINE

## 2021-09-28 PROCEDURE — 36415 COLL VENOUS BLD VENIPUNCTURE: CPT

## 2021-09-28 PROCEDURE — 84443 ASSAY THYROID STIM HORMONE: CPT

## 2021-09-28 NOTE — PROGRESS NOTES
Patient reports 4 migraines / month generally lasting several hours to 1 day. Started having migraines at age of 15. Has been seen at [facility]. Is currently on Amitriptyline 20mg nightly.   Has also been on Depakote, Propranolol, Zoloft, Topa

## 2021-09-28 NOTE — PROGRESS NOTES
[facility]   Neurology; INITIAL CLINIC VISIT  2021, 3:07 PM     Denita Bah Patient Status:  No patient class for patient encounter    1980 MRN [id number]   Location [facility], [address] [place] [address] grandmother; Eye Problems in her mother; Heart Disorder in her sister; Hypertension in her mother; Lipids in her father and mother; OCD in her son; Other in her father, mother, and sister; Pervasive developmental disorders in her brother;  Thyroid Disorder no heartburn  GENITAL/: no dysuria, urgency or frequency;  no hernias; no nocturia  GYNE/: no dysuria, urgency or frequency; no urinary incontinence  MUSCULOSKELETAL: no joint complaints in  upper or lower extremities  NEURO: see HPI;  PSYCHE: no sympt rapid finger movement symmetric. 5/5 in all limbs with normal tone. No tremor of any kind;   Sensory; Intact to light touch, temperature, vibration and proprioception;  DTRs;   Symmetric in both arms and legs, including biceps, triceps, knees, ankles,  Bab to go to local ER if current symptoms worse or significant new symptoms arise. They understood my instruction.      Jayy Gonzalez MD  General Neurology   Neuromuscular/ Electrodiagnostic Specialist  [facility]  9/28/2021

## 2021-09-29 DIAGNOSIS — E78.5 HYPERLIPIDEMIA, UNSPECIFIED HYPERLIPIDEMIA TYPE: ICD-10-CM

## 2021-09-29 DIAGNOSIS — R74.8 ELEVATED ALKALINE PHOSPHATASE LEVEL: Primary | ICD-10-CM

## 2021-09-29 NOTE — PROGRESS NOTES
Aldd alk phos isoenzymes since alkaline phosphatase is elevated. Repeat liver function tests in 3 months with the lipids. Reduce saturated fats and processed foods the  lipids are elevated.   Complete blood count including white blood cells and red blood

## 2021-09-29 NOTE — PROGRESS NOTES
All of the nodules are less than 2.5 cm no biopsy repeat in 1 year.   Order future tests/medications sent to my chart

## 2021-09-30 NOTE — PROGRESS NOTES
Ordered additional mammographic imaging for the right breast as well as bilateral whole breast screening ultrasound secondary to the dense breasts this will help increase the sensitivity for detection of cancer which could be obscured.

## 2021-11-04 ENCOUNTER — TELEPHONE (OUTPATIENT)
Dept: SURGERY | Facility: CLINIC | Age: 41
End: 2021-11-04

## 2021-11-04 DIAGNOSIS — G43.709 CHRONIC MIGRAINE W/O AURA W/O STATUS MIGRAINOSUS, NOT INTRACTABLE: Primary | ICD-10-CM

## 2021-11-04 NOTE — TELEPHONE ENCOUNTER
Please send a prescription to [facility]:    Botox 200 units, 3 refills      Si units to be administered by MD into multiple sites of head, neck and scalp every 3 months for chronic migraine prophylaxis.      Approval received from [employer]

## 2021-11-05 RX ORDER — ONABOTULINUMTOXINA 200 [USP'U]/1
INJECTION, POWDER, LYOPHILIZED, FOR SOLUTION INTRADERMAL; INTRAMUSCULAR
Qty: 1 EACH | Refills: 3 | Status: SHIPPED | OUTPATIENT
Start: 2021-11-05

## 2021-11-05 NOTE — TELEPHONE ENCOUNTER
Per [facility]. Patient's BOTOX is approved and has been sent to provider for review and signature.

## 2021-11-06 DIAGNOSIS — F41.1 GAD (GENERALIZED ANXIETY DISORDER): ICD-10-CM

## 2021-11-06 DIAGNOSIS — F33.40 DEPRESSION, MAJOR, RECURRENT, IN REMISSION (HCC): ICD-10-CM

## 2021-11-08 RX ORDER — FLUOXETINE HYDROCHLORIDE 20 MG/1
CAPSULE ORAL
Qty: 90 CAPSULE | Refills: 0 | Status: SHIPPED | OUTPATIENT
Start: 2021-11-08

## 2021-11-08 NOTE — TELEPHONE ENCOUNTER
Pt requesting refill of FLUOXETINE 20 MG Oral Cap    Last Time Medication was Filled:  10/06/2021    Last Office Visit with Provider:  03/17/2021 (Telemedicine)    Appt scheduled on No future appointments.     A 30 day refill was pended for the provider to

## 2021-11-09 NOTE — TELEPHONE ENCOUNTER
Called [facility] and spoke with Surjit Ramírez, gave her PA information.     Approval #[id number] from 11/2/21-11/2/22

## 2021-11-12 NOTE — TELEPHONE ENCOUNTER
Botox received in:  [facility], [address] [address]  [address]    Lot No: [id number]  Exp Date: 6/2024  # Units: 200     Pt appt scheduled not scheduled with Dr. Patrica Hernadez.    Botox

## 2021-11-18 ENCOUNTER — HOSPITAL ENCOUNTER (OUTPATIENT)
Dept: ULTRASOUND IMAGING | Age: 41
Discharge: HOME | End: 2021-11-18
Attending: FAMILY MEDICINE
Payer: COMMERCIAL

## 2021-11-18 ENCOUNTER — HOSPITAL ENCOUNTER (OUTPATIENT)
Dept: MAMMOGRAPHY | Age: 41
Discharge: HOME | End: 2021-11-18
Attending: FAMILY MEDICINE
Payer: COMMERCIAL

## 2021-11-18 DIAGNOSIS — R92.8 ABNORMAL MAMMOGRAM: ICD-10-CM

## 2021-11-18 PROCEDURE — 76642 ULTRASOUND BREAST LIMITED: CPT | Performed by: FAMILY MEDICINE

## 2021-11-18 PROCEDURE — 77065 DX MAMMO INCL CAD UNI: CPT | Performed by: FAMILY MEDICINE

## 2021-11-18 PROCEDURE — 77061 BREAST TOMOSYNTHESIS UNI: CPT | Performed by: FAMILY MEDICINE

## 2021-12-07 PROBLEM — F43.23 REACTION, ADJUSTMENT, WITH ANXIOUS, DEPRESSED MOOD: Status: ACTIVE | Noted: 2021-12-07

## 2021-12-07 PROBLEM — R06.83 SNORING: Status: ACTIVE | Noted: 2021-12-07

## 2021-12-07 PROBLEM — R06.81 WITNESSED EPISODE OF APNEA: Status: ACTIVE | Noted: 2021-12-07

## 2021-12-07 PROBLEM — R53.82 CHRONIC FATIGUE: Status: ACTIVE | Noted: 2021-12-07

## 2021-12-07 NOTE — PROGRESS NOTES
Demetri Lincoln is a 39year old female. HPI:   Please note that the following visit was completed using two-way, real-time interactive audio and video communication.   This has been done in good shahram to provide continuity of care in the best intere she does have a migraine. Her back is doing much better had gone to the chiropractor no more muscle relaxant or pain medication needed. Is due for an office visit and a wellness visit does state that she will make it for after [place].   Presently primo Acids (FISH OIL OR) Take 1 tablet by mouth daily. • OnabotulinumtoxinA (BOTOX) 200 units Injection Recon Soln Physician to inject 155 unit intramuscularly every three months to multiple sites of head, neck, scalp for chronic migraine prophylaxis.  (Pa disorder)  Reaction, adjustment, with anxious, depressed mood  Snoring  (primary encounter diagnosis)  Witnessed episode of apnea  Chronic fatigue  Obesity (bmi 35.0-39.9 without comorbidity)    No orders of the defined types were placed in this encounter. the plan. The patient is asked to return  1/2022 for CPE and follow up on new RXs or as needed.

## 2021-12-23 DIAGNOSIS — F41.1 GAD (GENERALIZED ANXIETY DISORDER): ICD-10-CM

## 2021-12-23 NOTE — TELEPHONE ENCOUNTER
Requested Prescriptions     Pending Prescriptions Disp Refills   • ALPRAZolam 0.5 MG Oral Tab [Pharmacy Med Name: ALPRAZOLAM 0.5MG TABLETS] 20 tablet 0     Sig: TAKE 1 TABLET BY MOUTH TWICE DAILY AS NEEDED     Last fill was 5/20/21 20 tabs 0 refill  Last O

## 2021-12-24 RX ORDER — ALPRAZOLAM 0.5 MG/1
TABLET ORAL
Qty: 10 TABLET | Refills: 0 | Status: SHIPPED | OUTPATIENT
Start: 2021-12-24

## 2021-12-27 ENCOUNTER — TELEPHONE (OUTPATIENT)
Dept: NEUROLOGY | Facility: CLINIC | Age: 41
End: 2021-12-27

## 2022-01-24 ENCOUNTER — OFFICE VISIT (OUTPATIENT)
Dept: NEUROLOGY | Facility: CLINIC | Age: 42
End: 2022-01-24
Payer: COMMERCIAL

## 2022-01-24 VITALS
SYSTOLIC BLOOD PRESSURE: 100 MMHG | DIASTOLIC BLOOD PRESSURE: 70 MMHG | BODY MASS INDEX: 36.44 KG/M2 | HEART RATE: 51 BPM | WEIGHT: 198 LBS | HEIGHT: 62 IN | RESPIRATION RATE: 16 BRPM

## 2022-01-24 DIAGNOSIS — G43.109 MIGRAINE WITH AURA AND WITHOUT STATUS MIGRAINOSUS, NOT INTRACTABLE: Primary | ICD-10-CM

## 2022-01-24 PROCEDURE — 3008F BODY MASS INDEX DOCD: CPT | Performed by: OTHER

## 2022-01-24 PROCEDURE — 3074F SYST BP LT 130 MM HG: CPT | Performed by: OTHER

## 2022-01-24 PROCEDURE — 64615 CHEMODENERV MUSC MIGRAINE: CPT | Performed by: OTHER

## 2022-01-24 PROCEDURE — 3078F DIAST BP <80 MM HG: CPT | Performed by: OTHER

## 2022-01-24 NOTE — PROGRESS NOTES
LOV 9/28/21 Botox for migraines- Patient present today for 1st botox for migraines. Patient states her migraine pain is the same.

## 2022-01-24 NOTE — PROCEDURES
Botox injection note    Procedure: Botox injection -chemodenervation  Consent: obtained after explanation of procedure detail, benefits and risks     Indication:   -chronic migraine patient who suffers 15 or more days with headache lasting 4 hours a day specialist  Board certified electrodiagnostic medicine

## 2022-02-08 ENCOUNTER — TELEPHONE (OUTPATIENT)
Dept: SURGERY | Facility: CLINIC | Age: 42
End: 2022-02-08

## 2022-02-08 RX ORDER — FLUOXETINE HYDROCHLORIDE 20 MG/1
CAPSULE ORAL
Qty: 90 CAPSULE | Refills: 0 | Status: SHIPPED | OUTPATIENT
Start: 2022-02-08 | End: 2022-03-31

## 2022-02-08 NOTE — TELEPHONE ENCOUNTER
Received Botox from [facility] this was not ordered by us at this time. Patients next appointment is 4/26/22. [facility] never called us to arrange this delivery they must of called the patient and she must of authorized this deliver to our office.

## 2022-03-08 RX ORDER — FLUOXETINE HYDROCHLORIDE 20 MG/1
CAPSULE ORAL
Qty: 90 CAPSULE | Refills: 0 | OUTPATIENT
Start: 2022-03-08

## 2022-03-31 RX ORDER — FLUOXETINE HYDROCHLORIDE 20 MG/1
60 CAPSULE ORAL DAILY
Qty: 90 CAPSULE | Refills: 0 | OUTPATIENT
Start: 2022-03-31

## 2022-03-31 NOTE — TELEPHONE ENCOUNTER
Refill request for fluoxetine and alprazolam    Last fill fluoxetine 3 caps daily was 2/8/22 90 tabs    Last fill alprazolam was 12/24/21 10 tabs  Last OV 12/6/21    FOV 4/18/22

## 2022-04-01 RX ORDER — FLUOXETINE HYDROCHLORIDE 20 MG/1
60 CAPSULE ORAL DAILY
Qty: 90 CAPSULE | Refills: 0 | Status: SHIPPED | OUTPATIENT
Start: 2022-04-01

## 2022-04-01 RX ORDER — ALPRAZOLAM 0.5 MG/1
TABLET ORAL
Qty: 10 TABLET | Refills: 0 | Status: SHIPPED | OUTPATIENT
Start: 2022-04-01

## 2022-04-18 ENCOUNTER — TELEMEDICINE (OUTPATIENT)
Dept: FAMILY MEDICINE CLINIC | Facility: CLINIC | Age: 42
End: 2022-04-18

## 2022-04-18 DIAGNOSIS — Z13.220 LIPID SCREENING: ICD-10-CM

## 2022-04-18 DIAGNOSIS — Z13.228 SCREENING FOR ENDOCRINE, METABOLIC AND IMMUNITY DISORDER: ICD-10-CM

## 2022-04-18 DIAGNOSIS — F33.40 DEPRESSION, MAJOR, RECURRENT, IN REMISSION (HCC): ICD-10-CM

## 2022-04-18 DIAGNOSIS — Z13.29 SCREENING FOR ENDOCRINE, METABOLIC AND IMMUNITY DISORDER: ICD-10-CM

## 2022-04-18 DIAGNOSIS — Z13.0 SCREENING FOR ENDOCRINE, METABOLIC AND IMMUNITY DISORDER: ICD-10-CM

## 2022-04-18 DIAGNOSIS — Z79.899 MEDICATION MANAGEMENT: Primary | ICD-10-CM

## 2022-04-18 DIAGNOSIS — F41.1 GAD (GENERALIZED ANXIETY DISORDER): ICD-10-CM

## 2022-04-18 PROCEDURE — 99214 OFFICE O/P EST MOD 30 MIN: CPT | Performed by: FAMILY MEDICINE

## 2022-04-18 RX ORDER — FLUTICASONE PROPIONATE 50 MCG
2 SPRAY, SUSPENSION (ML) NASAL DAILY PRN
Qty: 3 EACH | Refills: 1 | Status: SHIPPED | OUTPATIENT
Start: 2022-04-18

## 2022-04-18 RX ORDER — FLUOXETINE HYDROCHLORIDE 20 MG/1
60 CAPSULE ORAL DAILY
Qty: 270 CAPSULE | Refills: 0 | Status: SHIPPED | OUTPATIENT
Start: 2022-04-18 | End: 2022-07-17

## 2022-04-26 ENCOUNTER — OFFICE VISIT (OUTPATIENT)
Dept: NEUROLOGY | Facility: CLINIC | Age: 42
End: 2022-04-26
Payer: COMMERCIAL

## 2022-04-26 VITALS
DIASTOLIC BLOOD PRESSURE: 70 MMHG | BODY MASS INDEX: 36 KG/M2 | SYSTOLIC BLOOD PRESSURE: 124 MMHG | HEART RATE: 100 BPM | RESPIRATION RATE: 16 BRPM | WEIGHT: 199 LBS

## 2022-04-26 DIAGNOSIS — G43.109 MIGRAINE WITH AURA AND WITHOUT STATUS MIGRAINOSUS, NOT INTRACTABLE: Primary | ICD-10-CM

## 2022-04-26 PROCEDURE — 99213 OFFICE O/P EST LOW 20 MIN: CPT | Performed by: OTHER

## 2022-04-26 PROCEDURE — 3074F SYST BP LT 130 MM HG: CPT | Performed by: OTHER

## 2022-04-26 PROCEDURE — 3078F DIAST BP <80 MM HG: CPT | Performed by: OTHER

## 2022-04-26 PROCEDURE — 64615 CHEMODENERV MUSC MIGRAINE: CPT | Performed by: OTHER

## 2022-04-26 RX ORDER — UBROGEPANT 50 MG/1
TABLET ORAL
Qty: 10 TABLET | Refills: 3 | Status: SHIPPED | OUTPATIENT
Start: 2022-04-26 | End: 2022-05-26

## 2022-04-26 NOTE — PROGRESS NOTES
Paperwork noting that patient may bear financial responsibility for procedure(s) performed in clinic today signed prior to proceeding with procedure(s). Furthermore, patient notified that they should contact their insurer to verify that your procedure/test has been approved and is a COVERED benefit. Although the [facility] staff does its due diligence, the insurance carrier gives the disclaimer that \"Although the procedure is authorized, this does not guarantee payment. \"    Ultimately the patient is responsible for payment. Botox is:  [] Buy and Bill  [x] Patient Supplied    Botox Reauthorization Questions:  1. Has the patient experienced a reduction in frequency of migraines since starting Botox? no  a. If yes, by what percentage? 0%  2. Has the intensity of migraines decreased since starting Botox? no  a.  If yes, by what percentage? 0%

## 2022-04-27 ENCOUNTER — TELEPHONE (OUTPATIENT)
Dept: NEUROLOGY | Facility: CLINIC | Age: 42
End: 2022-04-27

## 2022-05-02 ENCOUNTER — TELEPHONE (OUTPATIENT)
Dept: NEUROLOGY | Facility: CLINIC | Age: 42
End: 2022-05-02

## 2022-06-28 ENCOUNTER — TELEPHONE (OUTPATIENT)
Dept: NEUROLOGY | Facility: CLINIC | Age: 42
End: 2022-06-28

## 2022-06-28 NOTE — TELEPHONE ENCOUNTER
Received fax from [facility], called to confirm delivery, spoke with Issac Leon, changed [address] number to [id number].  Delivery date 06/30/22, patient has given consent

## 2022-07-21 ENCOUNTER — APPOINTMENT (OUTPATIENT)
Dept: URBAN - METROPOLITAN AREA CLINIC 247 | Age: 42
Setting detail: DERMATOLOGY
End: 2022-07-26

## 2022-07-21 DIAGNOSIS — L57.8 OTHER SKIN CHANGES DUE TO CHRONIC EXPOSURE TO NONIONIZING RADIATION: ICD-10-CM

## 2022-07-21 DIAGNOSIS — L82.1 OTHER SEBORRHEIC KERATOSIS: ICD-10-CM

## 2022-07-21 DIAGNOSIS — D18.0 HEMANGIOMA: ICD-10-CM

## 2022-07-21 PROBLEM — D18.01 HEMANGIOMA OF SKIN AND SUBCUTANEOUS TISSUE: Status: ACTIVE | Noted: 2022-07-21

## 2022-07-21 PROCEDURE — OTHER COUNSELING: OTHER

## 2022-07-21 PROCEDURE — 99203 OFFICE O/P NEW LOW 30 MIN: CPT

## 2022-07-21 PROCEDURE — OTHER DEFER: OTHER

## 2022-07-21 PROCEDURE — OTHER MIPS QUALITY: OTHER

## 2022-07-21 ASSESSMENT — LOCATION ZONE DERM
LOCATION ZONE: LEG
LOCATION ZONE: FACE
LOCATION ZONE: TRUNK

## 2022-07-21 ASSESSMENT — LOCATION DETAILED DESCRIPTION DERM
LOCATION DETAILED: EPIGASTRIC SKIN
LOCATION DETAILED: INFERIOR THORACIC SPINE
LOCATION DETAILED: LEFT DISTAL PRETIBIAL REGION
LOCATION DETAILED: LEFT SUPERIOR MEDIAL BUCCAL CHEEK

## 2022-07-21 ASSESSMENT — LOCATION SIMPLE DESCRIPTION DERM
LOCATION SIMPLE: UPPER BACK
LOCATION SIMPLE: LEFT PRETIBIAL REGION
LOCATION SIMPLE: LEFT CHEEK
LOCATION SIMPLE: ABDOMEN

## 2022-08-04 ENCOUNTER — TELEPHONE (OUTPATIENT)
Dept: SURGERY | Facility: CLINIC | Age: 42
End: 2022-08-04

## 2022-08-04 NOTE — TELEPHONE ENCOUNTER
Pt had to r/s BOTOX appt on 8/8/22 to 11/9/22 first available. Pt states she has to take her son to an appt. Pt is a former pt of Dr. Mendez Brown and requesting an appt sooner that 11/9/22. The BOTOX appt is only approved through 11/2/22. Please advise.

## 2022-08-05 ENCOUNTER — TELEPHONE (OUTPATIENT)
Dept: SURGERY | Facility: CLINIC | Age: 42
End: 2022-08-05

## 2022-08-05 NOTE — TELEPHONE ENCOUNTER
Message left on   (jocelyn HIPAA consent) that maureen't has been put on hold for 9/2 at 9:30 until end of day today.

## 2022-08-17 DIAGNOSIS — F33.40 DEPRESSION, MAJOR, RECURRENT, IN REMISSION (HCC): ICD-10-CM

## 2022-08-17 DIAGNOSIS — F41.1 GAD (GENERALIZED ANXIETY DISORDER): ICD-10-CM

## 2022-08-17 RX ORDER — FLUOXETINE HYDROCHLORIDE 20 MG/1
CAPSULE ORAL
Qty: 90 CAPSULE | Refills: 0 | Status: SHIPPED | OUTPATIENT
Start: 2022-08-17

## 2022-08-22 DIAGNOSIS — G43.019 INTRACTABLE MIGRAINE WITHOUT AURA AND WITHOUT STATUS MIGRAINOSUS: ICD-10-CM

## 2022-08-22 RX ORDER — AMITRIPTYLINE HYDROCHLORIDE 10 MG/1
30 TABLET, FILM COATED ORAL NIGHTLY
Qty: 90 TABLET | Refills: 2 | Status: SHIPPED | OUTPATIENT
Start: 2022-08-22

## 2022-09-02 ENCOUNTER — TELEPHONE (OUTPATIENT)
Dept: SURGERY | Facility: CLINIC | Age: 42
End: 2022-09-02

## 2022-09-02 ENCOUNTER — OFFICE VISIT (OUTPATIENT)
Dept: NEUROLOGY | Facility: CLINIC | Age: 42
End: 2022-09-02
Payer: COMMERCIAL

## 2022-09-02 VITALS
SYSTOLIC BLOOD PRESSURE: 126 MMHG | DIASTOLIC BLOOD PRESSURE: 70 MMHG | WEIGHT: 201.19 LBS | BODY MASS INDEX: 37 KG/M2 | RESPIRATION RATE: 16 BRPM | HEART RATE: 90 BPM

## 2022-09-02 DIAGNOSIS — G43.709 CHRONIC MIGRAINE W/O AURA W/O STATUS MIGRAINOSUS, NOT INTRACTABLE: Primary | ICD-10-CM

## 2022-09-02 RX ORDER — UBROGEPANT 50 MG/1
1 TABLET ORAL AS NEEDED
COMMUNITY
Start: 2022-07-22 | End: 2022-09-02

## 2022-09-02 RX ORDER — AMITRIPTYLINE HYDROCHLORIDE 25 MG/1
25 TABLET, FILM COATED ORAL NIGHTLY
Qty: 30 TABLET | Refills: 5 | Status: SHIPPED | OUTPATIENT
Start: 2022-09-02

## 2022-09-02 RX ORDER — UBROGEPANT 100 MG/1
TABLET ORAL
Qty: 10 TABLET | Refills: 2 | Status: SHIPPED | OUTPATIENT
Start: 2022-09-02 | End: 2022-10-02

## 2022-09-02 RX ORDER — RIZATRIPTAN BENZOATE 10 MG/1
1 TABLET ORAL AS NEEDED
COMMUNITY
Start: 2022-08-15

## 2022-09-02 NOTE — PROCEDURES
Procedure: Botox injection -chemodenervation  Consent: obtained after explanation of procedure detail, benefits and risks     Indication:   -chronic migraine patient who suffers 15 or more days with headache lasting 4 hours a day or longer. Procedure description:  -Chronic Migraine  Dilution is 100 units/2 ml with a final concentration of 5 units per 0.1 ml. A sterile 30-gauge, 0.5 inch needle as 0.1 ml (5 units) injection per each site. Injections were divided across 7 specific head/neck muscle areas as specified in the table below. All muscles were injected bilaterally with half the number of injection sites administered to the left, and half to the right side of the head and neck. 25 units wasted. Head/Neck Area Dose (number of sites)   Frontalis bilaterally 20 units divided in 4 sites    bilaterally 10 units divided in 2 sites   Procerus 5 unit in 1 site   Occipitalis bilaterally 30 units divided in 6 sites   Temporalis bilaterally 40 units divided in 8 sites   Trapezius bilaterally 30 units divided in 6 sites   Cervical Paraspinal bilaterally 40 units divided in 2 sites   Total Dose 175 units divided in 31 sites         The procedure was completed successfully without complication during and after the injections, and the patient tolerated well throughout the procedure. The recommended re-treatment schedule should be ~12 weeks from today.

## 2022-09-02 NOTE — TELEPHONE ENCOUNTER
Pt needs 3 mnth botox appt(approx 12/02/2022 or later), no appts avail until end of Dec. Please advise if earlier appt avail.

## 2022-09-02 NOTE — PROGRESS NOTES
Patient states ubrelvy 50mg does not always work. Patient states after 2 months of botox, migraines started to increase. Paperwork noting that patient may bear financial responsibility for procedure(s) performed in clinic today signed prior to proceeding with procedure(s). Furthermore, patient notified that they should contact their insurer to verify that your procedure/test has been approved and is a COVERED benefit. Although the [facility] staff does its due diligence, the insurance carrier gives the disclaimer that \"Although the procedure is authorized, this does not guarantee payment. \"    Ultimately the patient is responsible for payment. Botox is:  [] Buy and Bill  [x] Patient Supplied      Botox Reauthorization Questions:  1. Has the patient experienced a reduction in frequency of migraines since starting Botox? yes  a. If yes, by what percentage? 80%  2. Has the intensity of migraines decreased since starting Botox? yes  a. If yes, by what percentage?  80%

## 2022-09-08 ENCOUNTER — TELEPHONE (OUTPATIENT)
Dept: NEUROLOGY | Facility: CLINIC | Age: 42
End: 2022-09-08

## 2022-09-08 NOTE — TELEPHONE ENCOUNTER
PA not needed per Epic review in referrals. Spoke with [facility] pharmacist who states disregard request as it has already been processed.

## 2022-09-09 ENCOUNTER — PATIENT MESSAGE (OUTPATIENT)
Dept: FAMILY MEDICINE CLINIC | Facility: CLINIC | Age: 42
End: 2022-09-09

## 2022-09-23 DIAGNOSIS — F41.1 GAD (GENERALIZED ANXIETY DISORDER): ICD-10-CM

## 2022-09-23 DIAGNOSIS — F33.40 DEPRESSION, MAJOR, RECURRENT, IN REMISSION (HCC): ICD-10-CM

## 2022-09-23 RX ORDER — FLUOXETINE HYDROCHLORIDE 20 MG/1
CAPSULE ORAL
Qty: 30 CAPSULE | Refills: 0 | Status: SHIPPED | OUTPATIENT
Start: 2022-09-23

## 2022-10-06 DIAGNOSIS — F41.1 GAD (GENERALIZED ANXIETY DISORDER): ICD-10-CM

## 2022-10-06 DIAGNOSIS — F33.40 DEPRESSION, MAJOR, RECURRENT, IN REMISSION (HCC): ICD-10-CM

## 2022-10-06 RX ORDER — FLUOXETINE HYDROCHLORIDE 20 MG/1
CAPSULE ORAL
Qty: 30 CAPSULE | Refills: 0 | OUTPATIENT
Start: 2022-10-06

## 2022-10-12 ENCOUNTER — OFFICE VISIT (OUTPATIENT)
Dept: FAMILY MEDICINE CLINIC | Facility: CLINIC | Age: 42
End: 2022-10-12
Payer: COMMERCIAL

## 2022-10-12 DIAGNOSIS — M25.50 MULTIPLE JOINT PAIN: ICD-10-CM

## 2022-10-12 DIAGNOSIS — R53.82 CHRONIC FATIGUE: ICD-10-CM

## 2022-10-12 DIAGNOSIS — Z79.899 NEW MEDICATION ADDED: ICD-10-CM

## 2022-10-12 DIAGNOSIS — M12.9 ARTHRITIS, MULTIPLE JOINT INVOLVEMENT: ICD-10-CM

## 2022-10-12 DIAGNOSIS — F98.8 ATTENTION DEFICIT DISORDER (ADD) WITHOUT HYPERACTIVITY: ICD-10-CM

## 2022-10-12 DIAGNOSIS — Z00.00 WELL FEMALE EXAM WITHOUT GYNECOLOGICAL EXAM: Primary | ICD-10-CM

## 2022-10-12 DIAGNOSIS — R23.2 HOT FLASHES: ICD-10-CM

## 2022-10-12 DIAGNOSIS — R77.0 LOW SERUM ALBUMIN: ICD-10-CM

## 2022-10-12 DIAGNOSIS — Z12.31 VISIT FOR SCREENING MAMMOGRAM: ICD-10-CM

## 2022-10-12 PROCEDURE — 3074F SYST BP LT 130 MM HG: CPT | Performed by: FAMILY MEDICINE

## 2022-10-12 PROCEDURE — 90471 IMMUNIZATION ADMIN: CPT | Performed by: FAMILY MEDICINE

## 2022-10-12 PROCEDURE — 99214 OFFICE O/P EST MOD 30 MIN: CPT | Performed by: FAMILY MEDICINE

## 2022-10-12 PROCEDURE — 99396 PREV VISIT EST AGE 40-64: CPT | Performed by: FAMILY MEDICINE

## 2022-10-12 PROCEDURE — 3078F DIAST BP <80 MM HG: CPT | Performed by: FAMILY MEDICINE

## 2022-10-12 PROCEDURE — 90686 IIV4 VACC NO PRSV 0.5 ML IM: CPT | Performed by: FAMILY MEDICINE

## 2022-10-12 PROCEDURE — 3008F BODY MASS INDEX DOCD: CPT | Performed by: FAMILY MEDICINE

## 2022-10-12 PROCEDURE — 93000 ELECTROCARDIOGRAM COMPLETE: CPT | Performed by: FAMILY MEDICINE

## 2022-10-13 ENCOUNTER — LAB ENCOUNTER (OUTPATIENT)
Dept: LAB | Age: 42
End: 2022-10-13
Attending: FAMILY MEDICINE
Payer: COMMERCIAL

## 2022-10-13 ENCOUNTER — TELEPHONE (OUTPATIENT)
Dept: NEUROLOGY | Facility: CLINIC | Age: 42
End: 2022-10-13

## 2022-10-13 ENCOUNTER — PATIENT MESSAGE (OUTPATIENT)
Dept: FAMILY MEDICINE CLINIC | Facility: CLINIC | Age: 42
End: 2022-10-13

## 2022-10-13 VITALS
RESPIRATION RATE: 18 BRPM | BODY MASS INDEX: 36 KG/M2 | WEIGHT: 203.19 LBS | SYSTOLIC BLOOD PRESSURE: 110 MMHG | DIASTOLIC BLOOD PRESSURE: 66 MMHG | HEIGHT: 63 IN | HEART RATE: 88 BPM | TEMPERATURE: 97 F

## 2022-10-13 DIAGNOSIS — Z13.29 SCREENING FOR ENDOCRINE, METABOLIC AND IMMUNITY DISORDER: ICD-10-CM

## 2022-10-13 DIAGNOSIS — M12.9 ARTHRITIS, MULTIPLE JOINT INVOLVEMENT: Primary | ICD-10-CM

## 2022-10-13 DIAGNOSIS — Z13.228 SCREENING FOR ENDOCRINE, METABOLIC AND IMMUNITY DISORDER: ICD-10-CM

## 2022-10-13 DIAGNOSIS — Z13.220 LIPID SCREENING: ICD-10-CM

## 2022-10-13 DIAGNOSIS — Z13.0 SCREENING FOR ENDOCRINE, METABOLIC AND IMMUNITY DISORDER: ICD-10-CM

## 2022-10-13 PROBLEM — R23.2 HOT FLASHES: Status: ACTIVE | Noted: 2022-10-13

## 2022-10-13 LAB
ALBUMIN SERPL-MCNC: 3.1 G/DL (ref 3.4–5)
ALBUMIN/GLOB SERPL: 0.8 {RATIO} (ref 1–2)
ALP LIVER SERPL-CCNC: 92 U/L
ALT SERPL-CCNC: 24 U/L
ANION GAP SERPL CALC-SCNC: 5 MMOL/L (ref 0–18)
AST SERPL-CCNC: 13 U/L (ref 15–37)
BASOPHILS # BLD AUTO: 0.06 X10(3) UL (ref 0–0.2)
BASOPHILS NFR BLD AUTO: 0.6 %
BILIRUB SERPL-MCNC: 0.3 MG/DL (ref 0.1–2)
BUN BLD-MCNC: 16 MG/DL (ref 7–18)
CALCIUM BLD-MCNC: 8.7 MG/DL (ref 8.5–10.1)
CHLORIDE SERPL-SCNC: 109 MMOL/L (ref 98–112)
CHOLEST SERPL-MCNC: 184 MG/DL (ref ?–200)
CO2 SERPL-SCNC: 24 MMOL/L (ref 21–32)
CREAT BLD-MCNC: 0.88 MG/DL
CRP SERPL-MCNC: 1.19 MG/DL (ref ?–0.3)
EOSINOPHIL # BLD AUTO: 0.23 X10(3) UL (ref 0–0.7)
EOSINOPHIL NFR BLD AUTO: 2.3 %
ERYTHROCYTE [DISTWIDTH] IN BLOOD BY AUTOMATED COUNT: 12.7 %
ERYTHROCYTE [SEDIMENTATION RATE] IN BLOOD: 28 MM/HR
ESTRADIOL SERPL-MCNC: 156 PG/ML
FASTING PATIENT LIPID ANSWER: YES
FASTING STATUS PATIENT QL REPORTED: YES
FSH SERPL-ACNC: 4.9 MIU/ML
GFR SERPLBLD BASED ON 1.73 SQ M-ARVRAT: 84 ML/MIN/1.73M2 (ref 60–?)
GLOBULIN PLAS-MCNC: 4 G/DL (ref 2.8–4.4)
GLUCOSE BLD-MCNC: 90 MG/DL (ref 70–99)
HCT VFR BLD AUTO: 43.6 %
HDLC SERPL-MCNC: 50 MG/DL (ref 40–59)
HGB BLD-MCNC: 14.4 G/DL
IMM GRANULOCYTES # BLD AUTO: 0.07 X10(3) UL (ref 0–1)
IMM GRANULOCYTES NFR BLD: 0.7 %
LDLC SERPL CALC-MCNC: 120 MG/DL (ref ?–100)
LYMPHOCYTES # BLD AUTO: 1.74 X10(3) UL (ref 1–4)
LYMPHOCYTES NFR BLD AUTO: 17.5 %
MCH RBC QN AUTO: 31.5 PG (ref 26–34)
MCHC RBC AUTO-ENTMCNC: 33 G/DL (ref 31–37)
MCV RBC AUTO: 95.4 FL
MONOCYTES # BLD AUTO: 0.7 X10(3) UL (ref 0.1–1)
MONOCYTES NFR BLD AUTO: 7 %
NEUTROPHILS # BLD AUTO: 7.13 X10 (3) UL (ref 1.5–7.7)
NEUTROPHILS # BLD AUTO: 7.13 X10(3) UL (ref 1.5–7.7)
NEUTROPHILS NFR BLD AUTO: 71.9 %
NONHDLC SERPL-MCNC: 134 MG/DL (ref ?–130)
OSMOLALITY SERPL CALC.SUM OF ELEC: 287 MOSM/KG (ref 275–295)
PLATELET # BLD AUTO: 362 10(3)UL (ref 150–450)
POTASSIUM SERPL-SCNC: 4 MMOL/L (ref 3.5–5.1)
PROT SERPL-MCNC: 7.1 G/DL (ref 6.4–8.2)
RBC # BLD AUTO: 4.57 X10(6)UL
RHEUMATOID FACT SERPL-ACNC: <10 IU/ML (ref ?–15)
SODIUM SERPL-SCNC: 138 MMOL/L (ref 136–145)
T4 FREE SERPL-MCNC: 1 NG/DL (ref 0.8–1.7)
TRIGL SERPL-MCNC: 74 MG/DL (ref 30–149)
TSI SER-ACNC: 0.64 MIU/ML (ref 0.36–3.74)
URATE SERPL-MCNC: 3.9 MG/DL
VLDLC SERPL CALC-MCNC: 13 MG/DL (ref 0–30)
WBC # BLD AUTO: 9.9 X10(3) UL (ref 4–11)

## 2022-10-13 PROCEDURE — 84443 ASSAY THYROID STIM HORMONE: CPT

## 2022-10-13 PROCEDURE — 80053 COMPREHEN METABOLIC PANEL: CPT

## 2022-10-13 PROCEDURE — 84439 ASSAY OF FREE THYROXINE: CPT

## 2022-10-13 PROCEDURE — 85025 COMPLETE CBC W/AUTO DIFF WBC: CPT

## 2022-10-13 PROCEDURE — 80061 LIPID PANEL: CPT

## 2022-10-13 NOTE — TELEPHONE ENCOUNTER
PA for Ubrelvy 100mg completed and faxed to [facility] with fax confirmation received.

## 2022-10-14 ENCOUNTER — PATIENT MESSAGE (OUTPATIENT)
Dept: FAMILY MEDICINE CLINIC | Facility: CLINIC | Age: 42
End: 2022-10-14

## 2022-10-14 DIAGNOSIS — R79.82 ELEVATED C-REACTIVE PROTEIN (CRP): ICD-10-CM

## 2022-10-14 DIAGNOSIS — M25.50 MULTIPLE JOINT PAIN: ICD-10-CM

## 2022-10-14 DIAGNOSIS — R70.0 ELEVATED ERYTHROCYTE SEDIMENTATION RATE: ICD-10-CM

## 2022-10-14 DIAGNOSIS — R77.0 LOW SERUM ALBUMIN: Primary | ICD-10-CM

## 2022-10-14 DIAGNOSIS — R53.82 CHRONIC FATIGUE: ICD-10-CM

## 2022-10-14 DIAGNOSIS — M25.50 MULTIPLE JOINT PAIN: Primary | ICD-10-CM

## 2022-10-14 LAB
CCP IGG SERPL-ACNC: 1.6 U/ML (ref 0–6.9)
CENTROMERE IGG SER-ACNC: 16 U/ML
DSDNA IGG SERPL IA-ACNC: 2.3 IU/ML
ENA AB SER QL IA: 2.8 UG/L
ENA AB SER QL IA: POSITIVE
ENA JO1 AB SER IA-ACNC: <0.3 U/ML
ENA RNP IGG SER IA-ACNC: 3.9 U/ML
ENA SCL70 IGG SER IA-ACNC: <0.6 U/ML
ENA SM IGG SER IA-ACNC: 0.9 U/ML
ENA SS-A IGG SER IA-ACNC: <0.4 U/ML
ENA SS-B IGG SER IA-ACNC: <0.4 U/ML
IGA SERPL-MCNC: 240 MG/DL (ref 70–312)
IGM SERPL-MCNC: 162 MG/DL (ref 43–279)
IMMUNOGLOBULIN PNL SER-MCNC: 1210 MG/DL (ref 791–1643)
U1 SNRNP IGG SER IA-ACNC: 1.6 U/ML

## 2022-10-14 NOTE — PROGRESS NOTES
Normal white and red blood cell counts. Normal kidney and liver function testing. Normal blood sugar testing. LDL is improving continue to try to watch your diet reduce saturated fats and increase foods rich in fiber try Mediterranean diet online.   Normal thyroid testing  Albumin is low adding  immunoglobulin and protein electrophoresis secondary to pain, fatigue and low albumin

## 2022-10-14 NOTE — TELEPHONE ENCOUNTER
From: Carloz Stuart  To: Glenda Fritz PA-C  Sent: 10/14/2022 5:37 AM CDT  Subject: Question regarding COMP METABOLIC PANEL (14)    Thank you for your quick notes on the bloodwork. What does the last part of your comments mean regarding the low albumin?

## 2022-10-14 NOTE — PROGRESS NOTES
Estradiol is normal, follicle-stimulating hormone is still normal no signs of menopause. Uric acid is normal  C-reactive protein and sed rate are elevated indicating an inflammatory process referring you to rheumatology for the joint pain. Rheumatoid factor is negative. Still waiting on CCP and ELIESER.       Place referral to rheumatology

## 2022-10-14 NOTE — PROGRESS NOTES
ELIESER is positive make appointment with rheumatologist secondary to autoimmune antibody positive with inflammatory markers sed rate and C-reactive protein elevated

## 2022-10-16 ENCOUNTER — TELEPHONE (OUTPATIENT)
Dept: FAMILY MEDICINE CLINIC | Facility: CLINIC | Age: 42
End: 2022-10-16

## 2022-10-16 NOTE — TELEPHONE ENCOUNTER
----- Message from Valdemar Cogan, PA-C sent at 10/14/2022  4:59 AM CDT -----  Estradiol is normal, follicle-stimulating hormone is still normal no signs of menopause. Uric acid is normal  C-reactive protein and sed rate are elevated indicating an inflammatory process referring you to rheumatology for the joint pain. Rheumatoid factor is negative. Still waiting on CCP and ELIESER.       Place referral to rheumatology

## 2022-10-17 ENCOUNTER — PATIENT MESSAGE (OUTPATIENT)
Dept: NEUROLOGY | Facility: CLINIC | Age: 42
End: 2022-10-17

## 2022-10-17 LAB
ALBUMIN SERPL ELPH-MCNC: 3.62 G/DL (ref 3.75–5.21)
ALBUMIN/GLOB SERPL: 1.14 {RATIO} (ref 1–2)
ALPHA1 GLOB SERPL ELPH-MCNC: 0.33 G/DL (ref 0.19–0.46)
ALPHA2 GLOB SERPL ELPH-MCNC: 0.77 G/DL (ref 0.48–1.05)
B-GLOBULIN SERPL ELPH-MCNC: 0.84 G/DL (ref 0.68–1.23)
GAMMA GLOB SERPL ELPH-MCNC: 1.24 G/DL (ref 0.62–1.7)
KAPPA LC FREE SER-MCNC: 2.15 MG/DL (ref 0.33–1.94)
KAPPA LC FREE/LAMBDA FREE SER NEPH: 0.9 {RATIO} (ref 0.26–1.65)
LAMBDA LC FREE SERPL-MCNC: 2.39 MG/DL (ref 0.57–2.63)
PROT SERPL-MCNC: 6.8 G/DL (ref 6.4–8.2)

## 2022-10-17 NOTE — TELEPHONE ENCOUNTER
From: Jana Mcclure  To: Jung Chawla MD  Sent: 10/17/2022 8:55 AM CDT  Subject: Appointment follow up    Good Morning,  I am writing this message as a follow up to a phone call I received early last week regarding my next appointment. I was supposed to receive a call back to tell me if they have an appointment opening for me to have my botox done. Despite several phone calls and attempting to schedule the appointment after my last appointment, we have been unable to find a timely appointment. I do not want to wait 4 months for my next botox treatment and should not have to since the plan is for every 3 months. If you are unable to find an appointment for me within that 3 month timeframe, I would greatly appreciate you asking another doctor to do the botox.

## 2022-10-18 ENCOUNTER — TELEPHONE (OUTPATIENT)
Dept: HEMATOLOGY/ONCOLOGY | Facility: HOSPITAL | Age: 42
End: 2022-10-18

## 2022-10-18 DIAGNOSIS — R53.82 CHRONIC FATIGUE: ICD-10-CM

## 2022-10-18 DIAGNOSIS — R77.9 ABNORMALITY OF PLASMA PROTEIN, UNSPECIFIED: Primary | ICD-10-CM

## 2022-10-18 DIAGNOSIS — R77.0 LOW SERUM ALBUMIN: ICD-10-CM

## 2022-10-18 DIAGNOSIS — M25.50 MULTIPLE JOINT PAIN: ICD-10-CM

## 2022-10-18 DIAGNOSIS — M54.42 ACUTE LEFT-SIDED LOW BACK PAIN WITH LEFT-SIDED SCIATICA: ICD-10-CM

## 2022-10-21 ENCOUNTER — TELEPHONE (OUTPATIENT)
Dept: NEUROLOGY | Facility: CLINIC | Age: 42
End: 2022-10-21

## 2022-10-21 ENCOUNTER — OFFICE VISIT (OUTPATIENT)
Dept: HEMATOLOGY/ONCOLOGY | Facility: HOSPITAL | Age: 42
End: 2022-10-21
Attending: INTERNAL MEDICINE
Payer: COMMERCIAL

## 2022-10-21 VITALS
BODY MASS INDEX: 35.61 KG/M2 | HEIGHT: 62.99 IN | OXYGEN SATURATION: 96 % | RESPIRATION RATE: 16 BRPM | DIASTOLIC BLOOD PRESSURE: 72 MMHG | TEMPERATURE: 98 F | WEIGHT: 201 LBS | SYSTOLIC BLOOD PRESSURE: 107 MMHG | HEART RATE: 115 BPM

## 2022-10-21 DIAGNOSIS — R76.8 ANTICENTROMERE ANTIBODIES PRESENT: Primary | ICD-10-CM

## 2022-10-21 DIAGNOSIS — D89.89 KAPPA LIGHT CHAIN DISEASE (HCC): ICD-10-CM

## 2022-10-21 PROCEDURE — 99204 OFFICE O/P NEW MOD 45 MIN: CPT | Performed by: INTERNAL MEDICINE

## 2022-10-21 NOTE — TELEPHONE ENCOUNTER
Botox re-authorization needed. Form filled out and signed by provider. Signed form and clinicals faxed to [facility] @ [phone] for approval under Medical benefits with [facility] supplying/shipping Botox.

## 2022-10-21 NOTE — PROGRESS NOTES
Education Record    Learner:  Patient/ spouse    Disease / Diagnosis:  Abnormal labs     Barriers / Limitations:  None   Comments:    Method:  Discussion   Comments:    General Topics:  Plan of care reviewed   Comments:    Outcome:  Shows understanding   Comments:    Reported fatigue and joint pain for a year to PCP, and labs drawn for work up at annual visit.

## 2022-10-25 ENCOUNTER — PATIENT MESSAGE (OUTPATIENT)
Dept: FAMILY MEDICINE CLINIC | Facility: CLINIC | Age: 42
End: 2022-10-25

## 2022-10-25 DIAGNOSIS — M54.42 ACUTE LEFT-SIDED LOW BACK PAIN WITH LEFT-SIDED SCIATICA: ICD-10-CM

## 2022-10-26 ENCOUNTER — PATIENT MESSAGE (OUTPATIENT)
Dept: FAMILY MEDICINE CLINIC | Facility: CLINIC | Age: 42
End: 2022-10-26

## 2022-10-26 ENCOUNTER — TELEPHONE (OUTPATIENT)
Dept: FAMILY MEDICINE CLINIC | Facility: CLINIC | Age: 42
End: 2022-10-26

## 2022-10-26 ENCOUNTER — E-VISIT (OUTPATIENT)
Dept: TELEHEALTH | Age: 42
End: 2022-10-26

## 2022-10-26 DIAGNOSIS — Z02.9 ENCOUNTERS FOR ADMINISTRATIVE PURPOSE: Primary | ICD-10-CM

## 2022-10-26 RX ORDER — TIZANIDINE 2 MG/1
TABLET ORAL EVERY 8 HOURS PRN
Qty: 20 TABLET | Refills: 0 | Status: SHIPPED | OUTPATIENT
Start: 2022-10-26

## 2022-10-26 NOTE — TELEPHONE ENCOUNTER
From: William Randall  Sent: 10/25/2022 12:53 PM CDT  To: [facility] Clinical Staff  Subject: Follow-up in the office for the medication in 1 month    Thank you! I also messed up my back again, but this time was just because I bent over to pick something up. I have an appointment with the [facility] today, but can you please ask Lavinia Parks to send in a script for some muscle relaxers? I am really struggling.

## 2022-10-26 NOTE — TELEPHONE ENCOUNTER
Jose Eduardo Mcintosh is seeing patients. When she responds she will be notified. Please let her know.

## 2022-10-26 NOTE — TELEPHONE ENCOUNTER
Pt called requesting Nicolette Caraballo or RN to call regarding her back pain. States she injured back 2 years ago and over the weekend she bent over to  something and states pain is constant.  Last time this happened she was prescribed steroids and muscle relaxer

## 2022-10-27 RX ORDER — TIZANIDINE 2 MG/1
TABLET ORAL
Qty: 15 TABLET | Refills: 0 | OUTPATIENT
Start: 2022-10-27

## 2022-10-27 RX ORDER — METHYLPREDNISOLONE 4 MG/1
TABLET ORAL
Qty: 1 EACH | Refills: 0 | Status: SHIPPED | OUTPATIENT
Start: 2022-10-27

## 2022-10-27 RX ORDER — ACETAMINOPHEN AND CODEINE PHOSPHATE 300; 30 MG/1; MG/1
TABLET ORAL
Qty: 15 TABLET | Refills: 0 | Status: SHIPPED | OUTPATIENT
Start: 2022-10-27

## 2022-11-01 ENCOUNTER — PATIENT MESSAGE (OUTPATIENT)
Dept: FAMILY MEDICINE CLINIC | Facility: CLINIC | Age: 42
End: 2022-11-01

## 2022-11-01 DIAGNOSIS — R06.83 SNORING: Primary | ICD-10-CM

## 2022-11-01 DIAGNOSIS — R06.81 WITNESSED EPISODE OF APNEA: ICD-10-CM

## 2022-11-01 NOTE — TELEPHONE ENCOUNTER
From: Alejandrina Hudson  To: Eric Evans PA-C  Sent: 11/1/2022 9:56 AM CDT  Subject: Sleep Study    Ray Cherry,  I hope you had a nice weekend. In regards to the sleep study that you ordered for me. Michelle Barclay I know, it's been awhile. ..but I haven't done it because I've been dreading it. However, I found that the [facility] in [facility] does a HOME Sleep Study! I am much more likely to comply with that test than going and staying somewhere overnight. Is that ok? If so, can you please fax an order for a HOME Sleep Study to [phone]? Also, the steroid pack worked miracles. Michelle Barclay I had no pain over the weekend and enjoyed Family Weekend at [address] with my boy. Unfortunately, today is the last day of the steroid pack and the back pain is already returning. :(  Thanks a bunch!   Tasha Galicia

## 2022-11-03 ENCOUNTER — PATIENT MESSAGE (OUTPATIENT)
Dept: FAMILY MEDICINE CLINIC | Facility: CLINIC | Age: 42
End: 2022-11-03

## 2022-11-03 ENCOUNTER — EKG ENCOUNTER (OUTPATIENT)
Dept: LAB | Age: 42
End: 2022-11-03
Attending: FAMILY MEDICINE
Payer: COMMERCIAL

## 2022-11-03 DIAGNOSIS — F98.8 ATTENTION DEFICIT DISORDER (ADD) WITHOUT HYPERACTIVITY: ICD-10-CM

## 2022-11-03 DIAGNOSIS — R94.31 ABNORMAL EKG: ICD-10-CM

## 2022-11-03 DIAGNOSIS — R07.89 CHEST PAIN, MID STERNAL: Primary | ICD-10-CM

## 2022-11-03 DIAGNOSIS — R06.09 DYSPNEA ON EXERTION: ICD-10-CM

## 2022-11-03 LAB
ATRIAL RATE: 98 BPM
P AXIS: 37 DEGREES
P-R INTERVAL: 144 MS
Q-T INTERVAL: 350 MS
QRS DURATION: 66 MS
QTC CALCULATION (BEZET): 446 MS
R AXIS: 23 DEGREES
T AXIS: 12 DEGREES
VENTRICULAR RATE: 98 BPM

## 2022-11-03 PROCEDURE — 93010 ELECTROCARDIOGRAM REPORT: CPT | Performed by: INTERNAL MEDICINE

## 2022-11-03 PROCEDURE — 93005 ELECTROCARDIOGRAM TRACING: CPT

## 2022-11-03 NOTE — TELEPHONE ENCOUNTER
From: Janice Hinojosa  To: Karen Kennedy PA-C  Sent: 11/3/2022 12:58 PM CDT  Subject: Follow up    [address] [facility] [address],  The Rheumatologist ordered more labs and those results are now in [address] I follow up with her on 11/17 to discuss. Don't be mad. ..but I've put together a list of stuff I have noticed this year. I didn't really think any of it was a big deal when they happened, but think I should just put all the cards on the table. I have shared with the Rheumatologist as well. No clue if any of it is related or means anything, but here it is. .. Good Morning,  Thank you for the message about bloodwork. I look forward to our call on 11/17, but wanted to share with a full list of symptoms. I didn't go over ALL of this when there, as I was feeling a intimidated and that telling you my mom has Fibromyalgia, immediately put me on the Fibro list. I do not believe I have Fibro, but I know something is wrong. Here is the list of symptoms I noticed over the past year, in addition to my fatigue and joint pain. Swallowing difficulty  Dry Mouth  Reflux  Body jerking   Tremors in right leg  Frequent headaches (Botox/ every 3 mo.)  Sinus pressure issues  Pain above left eye that goes to left ear, jaw, neck, shoulder  Eustachian Tube Dysfunction  Periodic nausea  Hair loss   Itchy scalp  Gland pain in my neck  Pain in spine/hips/pelvic bone  Neck pain  Incontinence (This has been for a few years and getting worse)  Skin differences on my toe joints  Period sudden deep breaths     I'm sorry I didn't share all of this during my visit. ..that was definitely neglectful on my part. I go for my EKG today.

## 2022-11-04 ENCOUNTER — TELEPHONE (OUTPATIENT)
Dept: NEUROLOGY | Facility: CLINIC | Age: 42
End: 2022-11-04

## 2022-11-04 ENCOUNTER — TELEPHONE (OUTPATIENT)
Dept: FAMILY MEDICINE CLINIC | Facility: CLINIC | Age: 42
End: 2022-11-04

## 2022-11-04 DIAGNOSIS — G43.709 CHRONIC MIGRAINE W/O AURA W/O STATUS MIGRAINOSUS, NOT INTRACTABLE: ICD-10-CM

## 2022-11-04 NOTE — PROGRESS NOTES
EKG findings were nonspecific and did not indicate anything acute. Your list did not indicate any problems with chest pain or shortness of breath but if these symptoms are present I would suggest a stress test..

## 2022-11-04 NOTE — TELEPHONE ENCOUNTER
Received faxed from Otolaryngology office of  Luisito Prado . He sent over his consultation note to review from date of service 11/03/2022.

## 2022-11-07 ENCOUNTER — ORDER TRANSCRIPTION (OUTPATIENT)
Dept: ADMINISTRATIVE | Facility: HOSPITAL | Age: 42
End: 2022-11-07

## 2022-11-07 DIAGNOSIS — F41.1 GAD (GENERALIZED ANXIETY DISORDER): ICD-10-CM

## 2022-11-07 DIAGNOSIS — Z01.818 PREOP EXAMINATION: Primary | ICD-10-CM

## 2022-11-07 DIAGNOSIS — F33.40 DEPRESSION, MAJOR, RECURRENT, IN REMISSION (HCC): ICD-10-CM

## 2022-11-07 DIAGNOSIS — Z11.59 ENCOUNTER FOR SCREENING FOR OTHER VIRAL DISEASES: ICD-10-CM

## 2022-11-08 RX ORDER — FLUOXETINE HYDROCHLORIDE 20 MG/1
CAPSULE ORAL
Qty: 90 CAPSULE | Refills: 0 | Status: SHIPPED | OUTPATIENT
Start: 2022-11-08

## 2022-11-08 RX ORDER — ONABOTULINUMTOXINA 200 [USP'U]/1
INJECTION, POWDER, LYOPHILIZED, FOR SOLUTION INTRADERMAL; INTRAMUSCULAR
Qty: 1 EACH | Refills: 3 | Status: SHIPPED | OUTPATIENT
Start: 2022-11-08

## 2022-11-08 RX ORDER — METHYLPREDNISOLONE 4 MG/1
TABLET ORAL
Qty: 21 EACH | Refills: 0 | OUTPATIENT
Start: 2022-11-08

## 2022-11-09 ENCOUNTER — TELEPHONE (OUTPATIENT)
Dept: NEUROLOGY | Facility: CLINIC | Age: 42
End: 2022-11-09

## 2022-11-09 NOTE — TELEPHONE ENCOUNTER
Called [facility] [phone], spoke with Lashonda Stark. Rx was placed on hold as [facility] needed new [place] authorization number. Supplied new authorization number. She placed urgent request for update. Patient consent will be needed as a new RX was sent 11/08/22. Lashonda Stark stated it could take anywhere between 24- 48 hours before RX would be ready for shipment.

## 2022-11-18 ENCOUNTER — HOSPITAL ENCOUNTER (OUTPATIENT)
Dept: CV DIAGNOSTICS | Age: 42
End: 2022-11-18
Attending: FAMILY MEDICINE
Payer: COMMERCIAL

## 2022-11-18 ENCOUNTER — HOSPITAL ENCOUNTER (OUTPATIENT)
Dept: GENERAL RADIOLOGY | Age: 42
Discharge: HOME | End: 2022-11-18
Attending: OTOLARYNGOLOGY
Payer: COMMERCIAL

## 2022-11-18 ENCOUNTER — HOSPITAL ENCOUNTER (OUTPATIENT)
Dept: CT IMAGING | Age: 42
Discharge: HOME | End: 2022-11-18
Attending: OTOLARYNGOLOGY
Payer: COMMERCIAL

## 2022-11-18 DIAGNOSIS — R13.12 OROPHARYNGEAL DYSPHAGIA: ICD-10-CM

## 2022-11-18 DIAGNOSIS — J32.9 CHRONIC SINUSITIS, UNSPECIFIED LOCATION: ICD-10-CM

## 2022-11-18 PROCEDURE — 74221 X-RAY XM ESOPHAGUS 2CNTRST: CPT | Performed by: OTOLARYNGOLOGY

## 2022-11-18 PROCEDURE — 70486 CT MAXILLOFACIAL W/O DYE: CPT | Performed by: OTOLARYNGOLOGY

## 2022-11-22 ENCOUNTER — TELEPHONE (OUTPATIENT)
Dept: FAMILY MEDICINE CLINIC | Facility: CLINIC | Age: 42
End: 2022-11-22

## 2022-11-22 NOTE — TELEPHONE ENCOUNTER
Pt called requesting order be faxed to [facility] @ [phone]. [facility] is out of pt's network. Thank you.

## 2022-11-30 ENCOUNTER — PATIENT MESSAGE (OUTPATIENT)
Dept: FAMILY MEDICINE CLINIC | Facility: CLINIC | Age: 42
End: 2022-11-30

## 2022-12-01 NOTE — TELEPHONE ENCOUNTER
From: Courtenay Hammans  To: Nikkie Mccarthy PA-C  Sent: 11/3/2022 12:58 PM CDT  Subject: Follow up    [address],  The Rheumatologist ordered more labs and those results are now in [address] I follow up with her on 11/17 to discuss. Don't be mad. ..but I've put together a list of stuff I have noticed this year. I didn't really think any of it was a big deal when they happened, but think I should just put all the cards on the table. I have shared with the Rheumatologist as well. No clue if any of it is related or means anything, but here it is. .. Good Morning,  Thank you for the message about bloodwork. I look forward to our call on 11/17, but wanted to share with a full list of symptoms. I didn't go over ALL of this when there, as I was feeling a intimidated and that telling you my mom has Fibromyalgia, immediately put me on the Fibro list. I do not believe I have Fibro, but I know something is wrong. Here is the list of symptoms I noticed over the past year, in addition to my fatigue and joint pain. Swallowing difficulty  Dry Mouth  Reflux  Body jerking   Tremors in right leg  Frequent headaches (Botox/ every 3 mo.)  Sinus pressure issues  Pain above left eye that goes to left ear, jaw, neck, shoulder  Eustachian Tube Dysfunction  Periodic nausea  Hair loss   Itchy scalp  Gland pain in my neck  Pain in spine/hips/pelvic bone  Neck pain  Incontinence (This has been for a few years and getting worse)  Skin differences on my toe joints  Period sudden deep breaths     I'm sorry I didn't share all of this during my visit. ..that was definitely neglectful on my part. I go for my EKG today.

## 2022-12-02 ENCOUNTER — OFFICE VISIT (OUTPATIENT)
Dept: NEUROLOGY | Facility: CLINIC | Age: 42
End: 2022-12-02
Payer: COMMERCIAL

## 2022-12-02 VITALS
RESPIRATION RATE: 16 BRPM | SYSTOLIC BLOOD PRESSURE: 104 MMHG | HEART RATE: 68 BPM | DIASTOLIC BLOOD PRESSURE: 66 MMHG | BODY MASS INDEX: 36 KG/M2 | WEIGHT: 200.38 LBS

## 2022-12-02 DIAGNOSIS — G25.3 MYOCLONIC JERKING: ICD-10-CM

## 2022-12-02 DIAGNOSIS — G43.709 CHRONIC MIGRAINE W/O AURA W/O STATUS MIGRAINOSUS, NOT INTRACTABLE: Primary | ICD-10-CM

## 2022-12-02 NOTE — PROCEDURES
Procedure: Botox injection -chemodenervation  Consent: obtained after explanation of procedure detail, benefits and risks     Indication:   -chronic migraine patient who suffers 15 or more days with headache lasting 4 hours a day or longer. Procedure description:  -Chronic Migraine  Dilution is 100 units/2 ml with a final concentration of 5 units per 0.1 ml. A sterile 30-gauge, 0.5 inch needle as 0.1 ml (5 units) injection per each site. Injections were divided across 7 specific head/neck muscle areas as specified in the table below. All muscles were injected bilaterally with half the number of injection sites administered to the left, and half to the right side of the head and neck. 45 units wasted. Head/Neck Area Dose (number of sites)   Frontalis bilaterally 20 units divided in 4 sites    bilaterally 10 units divided in 2 sites   Procerus 5 unit in 1 site   Occipitalis bilaterally 30 units divided in 6 sites   Temporalis bilaterally 40 units divided in 8 sites   Trapezius bilaterally 30 units divided in 6 sites   Cervical Paraspinal bilaterally 30 units divided in 2 sites   Total Dose 165 units divided in 31 sites         The procedure was completed successfully without complication during and after the injections, and the patient tolerated well throughout the procedure. The recommended re-treatment schedule should be ~12 weeks from today.

## 2022-12-02 NOTE — PROGRESS NOTES
Patient reports migraines creep back about 2 weeks prior to next Botox. Has had headache for the last 3 days. Paperwork noting that patient may bear financial responsibility for procedure(s) performed in clinic today signed prior to proceeding with procedure(s). Furthermore, patient notified that they should contact their insurer to verify that your procedure/test has been approved and is a COVERED benefit. Although the [facility] staff does its due diligence, the insurance carrier gives the disclaimer that \"Although the procedure is authorized, this does not guarantee payment. \"    Ultimately the patient is responsible for payment. Botox is:  [x] Buy and Bill  [] Patient Supplied      Botox Reauthorization Questions:  1. Has the patient experienced a reduction in frequency of migraines since starting Botox? yes  a. If yes, by what percentage? 75%  2. Has the intensity of migraines decreased since starting Botox? yes  a. If yes, by what percentage?  50%

## 2022-12-26 DIAGNOSIS — F33.40 DEPRESSION, MAJOR, RECURRENT, IN REMISSION (HCC): ICD-10-CM

## 2022-12-26 DIAGNOSIS — F41.1 GAD (GENERALIZED ANXIETY DISORDER): ICD-10-CM

## 2022-12-27 RX ORDER — FLUOXETINE HYDROCHLORIDE 20 MG/1
CAPSULE ORAL
Qty: 90 CAPSULE | Refills: 0 | Status: SHIPPED | OUTPATIENT
Start: 2022-12-27

## 2023-02-01 ENCOUNTER — OFFICE VISIT (OUTPATIENT)
Dept: RHEUMATOLOGY | Facility: CLINIC | Age: 43
End: 2023-02-01
Payer: COMMERCIAL

## 2023-02-01 VITALS
OXYGEN SATURATION: 98 % | HEART RATE: 110 BPM | SYSTOLIC BLOOD PRESSURE: 104 MMHG | RESPIRATION RATE: 16 BRPM | TEMPERATURE: 98 F | DIASTOLIC BLOOD PRESSURE: 62 MMHG | BODY MASS INDEX: 35.7 KG/M2 | HEIGHT: 62 IN | WEIGHT: 194 LBS

## 2023-02-01 DIAGNOSIS — M35.9 UNDIFFERENTIATED CONNECTIVE TISSUE DISEASE (HCC): Primary | ICD-10-CM

## 2023-02-01 DIAGNOSIS — R76.8 POSITIVE ANA (ANTINUCLEAR ANTIBODY): ICD-10-CM

## 2023-02-01 DIAGNOSIS — K21.9 GASTROESOPHAGEAL REFLUX DISEASE, UNSPECIFIED WHETHER ESOPHAGITIS PRESENT: ICD-10-CM

## 2023-02-01 DIAGNOSIS — Z11.59 NEED FOR HEPATITIS C SCREENING TEST: ICD-10-CM

## 2023-02-01 DIAGNOSIS — R76.8 DS DNA ANTIBODY POSITIVE: ICD-10-CM

## 2023-02-01 DIAGNOSIS — R53.83 OTHER FATIGUE: ICD-10-CM

## 2023-02-01 DIAGNOSIS — Z11.59 NEED FOR HEPATITIS B SCREENING TEST: ICD-10-CM

## 2023-02-01 DIAGNOSIS — Z51.81 THERAPEUTIC DRUG MONITORING: ICD-10-CM

## 2023-02-01 DIAGNOSIS — K22.4 ESOPHAGEAL DYSMOTILITY: ICD-10-CM

## 2023-02-01 DIAGNOSIS — R76.8 CENTROMERE ANTIBODY POSITIVE: ICD-10-CM

## 2023-02-01 DIAGNOSIS — Z11.1 SCREENING FOR TUBERCULOSIS: ICD-10-CM

## 2023-02-01 PROCEDURE — 3008F BODY MASS INDEX DOCD: CPT | Performed by: INTERNAL MEDICINE

## 2023-02-01 PROCEDURE — 99205 OFFICE O/P NEW HI 60 MIN: CPT | Performed by: INTERNAL MEDICINE

## 2023-02-01 PROCEDURE — 3074F SYST BP LT 130 MM HG: CPT | Performed by: INTERNAL MEDICINE

## 2023-02-01 PROCEDURE — 3078F DIAST BP <80 MM HG: CPT | Performed by: INTERNAL MEDICINE

## 2023-02-01 RX ORDER — AMOXICILLIN 500 MG/1
500 TABLET, FILM COATED ORAL 4 TIMES DAILY
COMMUNITY
Start: 2023-01-30

## 2023-02-01 RX ORDER — AMOXICILLIN AND CLAVULANATE POTASSIUM 875; 125 MG/1; MG/1
TABLET, FILM COATED ORAL
COMMUNITY
Start: 2023-01-31

## 2023-02-01 RX ORDER — HYDROXYCHLOROQUINE SULFATE 200 MG/1
200 TABLET, FILM COATED ORAL
COMMUNITY
Start: 2023-01-17

## 2023-02-01 RX ORDER — UBROGEPANT 100 MG/1
TABLET ORAL
COMMUNITY
Start: 2023-01-09

## 2023-02-01 RX ORDER — PREDNISONE 1 MG/1
1 TABLET ORAL
Qty: 450 TABLET | Refills: 0 | Status: SHIPPED | OUTPATIENT
Start: 2023-02-01

## 2023-02-01 RX ORDER — METHYLPREDNISOLONE 4 MG/1
TABLET ORAL
COMMUNITY
Start: 2023-01-31

## 2023-02-06 ENCOUNTER — TELEPHONE (OUTPATIENT)
Dept: SURGERY | Facility: CLINIC | Age: 43
End: 2023-02-06

## 2023-02-06 NOTE — TELEPHONE ENCOUNTER
Called [facility] [phone] and spoke with Ilia Pineda. This is ready to deliver. It will be delivered to the [place] office on 2/9/23.  Confirmed address and hours

## 2023-02-07 RX ORDER — AMITRIPTYLINE HYDROCHLORIDE 25 MG/1
25 TABLET, FILM COATED ORAL NIGHTLY
Qty: 30 TABLET | Refills: 5 | Status: SHIPPED | OUTPATIENT
Start: 2023-02-07

## 2023-02-09 ENCOUNTER — TELEPHONE (OUTPATIENT)
Dept: SURGERY | Facility: CLINIC | Age: 43
End: 2023-02-09

## 2023-02-16 DIAGNOSIS — F41.1 GAD (GENERALIZED ANXIETY DISORDER): ICD-10-CM

## 2023-02-16 DIAGNOSIS — F33.40 DEPRESSION, MAJOR, RECURRENT, IN REMISSION (HCC): ICD-10-CM

## 2023-02-18 RX ORDER — FLUOXETINE HYDROCHLORIDE 20 MG/1
CAPSULE ORAL
Qty: 90 CAPSULE | Refills: 0 | Status: SHIPPED | OUTPATIENT
Start: 2023-02-18

## 2023-03-01 ENCOUNTER — OFFICE VISIT (OUTPATIENT)
Dept: NEUROLOGY | Facility: CLINIC | Age: 43
End: 2023-03-01
Payer: COMMERCIAL

## 2023-03-01 ENCOUNTER — TELEPHONE (OUTPATIENT)
Dept: FAMILY MEDICINE CLINIC | Facility: CLINIC | Age: 43
End: 2023-03-01

## 2023-03-01 VITALS
DIASTOLIC BLOOD PRESSURE: 62 MMHG | SYSTOLIC BLOOD PRESSURE: 126 MMHG | WEIGHT: 194 LBS | HEART RATE: 109 BPM | BODY MASS INDEX: 35 KG/M2 | RESPIRATION RATE: 16 BRPM

## 2023-03-01 DIAGNOSIS — F33.40 DEPRESSION, MAJOR, RECURRENT, IN REMISSION (HCC): ICD-10-CM

## 2023-03-01 DIAGNOSIS — G43.709 CHRONIC MIGRAINE W/O AURA W/O STATUS MIGRAINOSUS, NOT INTRACTABLE: Primary | ICD-10-CM

## 2023-03-01 DIAGNOSIS — F41.1 GAD (GENERALIZED ANXIETY DISORDER): ICD-10-CM

## 2023-03-01 PROCEDURE — 3078F DIAST BP <80 MM HG: CPT | Performed by: OTHER

## 2023-03-01 PROCEDURE — 3074F SYST BP LT 130 MM HG: CPT | Performed by: OTHER

## 2023-03-01 PROCEDURE — 64615 CHEMODENERV MUSC MIGRAINE: CPT | Performed by: OTHER

## 2023-03-01 RX ORDER — RIZATRIPTAN BENZOATE 10 MG/1
10 TABLET ORAL AS NEEDED
Qty: 10 TABLET | Refills: 2 | Status: SHIPPED | OUTPATIENT
Start: 2023-03-01 | End: 2023-03-03

## 2023-03-01 RX ORDER — FLUOXETINE HYDROCHLORIDE 20 MG/1
60 CAPSULE ORAL DAILY
Qty: 270 CAPSULE | Refills: 0 | Status: SHIPPED | OUTPATIENT
Start: 2023-03-01 | End: 2023-05-30

## 2023-03-01 RX ORDER — HYDROXYCHLOROQUINE SULFATE 200 MG/1
TABLET, FILM COATED ORAL
COMMUNITY
Start: 2023-01-17

## 2023-03-01 NOTE — PROCEDURES
Procedure: Botox injection -chemodenervation  Consent: obtained after explanation of procedure detail, benefits and risks     Indication:   -chronic migraine patient who suffers 15 or more days with headache lasting 4 hours a day or longer. Procedure description:  -Chronic Migraine  Dilution is 100 units/2 ml with a final concentration of 5 units per 0.1 ml. A sterile 30-gauge, 0.5 inch needle as 0.1 ml (5 units) injection per each site. Injections were divided across 7 specific head/neck muscle areas as specified in the table below. All muscles were injected bilaterally with half the number of injection sites administered to the left, and half to the right side of the head and neck. 35 units wasted. Head/Neck Area Dose (number of sites)   Frontalis bilaterally 20 units divided in 4 sites    bilaterally 10 units divided in 2 sites   Procerus 5 unit in 1 site   Occipitalis bilaterally 30 units divided in 6 sites   Temporalis bilaterally 40 units divided in 8 sites   Trapezius bilaterally 30 units divided in 6 sites   Cervical Paraspinal bilaterally 30 units divided in 2 sites   Total Dose 165 units divided in 31 sites         The procedure was completed successfully without complication during and after the injections, and the patient tolerated well throughout the procedure. The recommended re-treatment schedule should be ~12 weeks from today.

## 2023-03-02 DIAGNOSIS — G43.019 INTRACTABLE MIGRAINE WITHOUT AURA AND WITHOUT STATUS MIGRAINOSUS: ICD-10-CM

## 2023-03-03 ENCOUNTER — TELEPHONE (OUTPATIENT)
Dept: SURGERY | Facility: CLINIC | Age: 43
End: 2023-03-03

## 2023-03-03 ENCOUNTER — PATIENT MESSAGE (OUTPATIENT)
Dept: RHEUMATOLOGY | Facility: CLINIC | Age: 43
End: 2023-03-03

## 2023-03-03 RX ORDER — RIZATRIPTAN BENZOATE 10 MG/1
TABLET ORAL
Qty: 12 TABLET | Refills: 2 | Status: SHIPPED | OUTPATIENT
Start: 2023-03-03

## 2023-03-03 RX ORDER — UBROGEPANT 100 MG/1
TABLET ORAL
Qty: 30 TABLET | Refills: 0 | Status: CANCELLED | OUTPATIENT
Start: 2023-03-03

## 2023-03-03 RX ORDER — AMITRIPTYLINE HYDROCHLORIDE 10 MG/1
30 TABLET, FILM COATED ORAL NIGHTLY
Qty: 90 TABLET | Refills: 2 | Status: CANCELLED | OUTPATIENT
Start: 2023-03-03

## 2023-03-03 NOTE — TELEPHONE ENCOUNTER
DIALLO on VM (ok per HIPAA consent) to verify that she does want these prescriptions switched to [facility] and [facility] and to ask if she is still taking [place] and [place].

## 2023-03-03 NOTE — TELEPHONE ENCOUNTER
Reordered medication with correct sig to stated may take one tablet at onset of migraine, ok to take 2nd tablet 2 hours later if needed.   MAX dosage of 2 tablets in 24 hours

## 2023-03-03 NOTE — TELEPHONE ENCOUNTER
Torsten zarate from [facility] with clarifying questions for medication Rizatriptan Benzoate 10 MG Oral Tab

## 2023-03-06 ENCOUNTER — LAB ENCOUNTER (OUTPATIENT)
Dept: LAB | Age: 43
End: 2023-03-06
Attending: INTERNAL MEDICINE
Payer: COMMERCIAL

## 2023-03-06 DIAGNOSIS — R76.8 POSITIVE ANA (ANTINUCLEAR ANTIBODY): ICD-10-CM

## 2023-03-06 DIAGNOSIS — Z11.59 NEED FOR HEPATITIS C SCREENING TEST: ICD-10-CM

## 2023-03-06 DIAGNOSIS — Z51.81 THERAPEUTIC DRUG MONITORING: ICD-10-CM

## 2023-03-06 DIAGNOSIS — M35.9 UNDIFFERENTIATED CONNECTIVE TISSUE DISEASE (HCC): ICD-10-CM

## 2023-03-06 DIAGNOSIS — Z11.1 SCREENING FOR TUBERCULOSIS: ICD-10-CM

## 2023-03-06 DIAGNOSIS — R76.8 DS DNA ANTIBODY POSITIVE: ICD-10-CM

## 2023-03-06 DIAGNOSIS — R53.83 OTHER FATIGUE: ICD-10-CM

## 2023-03-06 DIAGNOSIS — Z11.59 NEED FOR HEPATITIS B SCREENING TEST: ICD-10-CM

## 2023-03-06 DIAGNOSIS — R76.8 CENTROMERE ANTIBODY POSITIVE: ICD-10-CM

## 2023-03-06 LAB
BILIRUB UR QL STRIP.AUTO: NEGATIVE
COLOR UR AUTO: YELLOW
DEPRECATED HBV CORE AB SER IA-ACNC: 38.3 NG/ML
GLUCOSE UR STRIP.AUTO-MCNC: NEGATIVE MG/DL
HBV CORE AB SERPL QL IA: NONREACTIVE
HBV SURFACE AB SER QL: REACTIVE
HBV SURFACE AB SERPL IA-ACNC: 29.69 MIU/ML
HBV SURFACE AG SER-ACNC: <0.1 [IU]/L
HBV SURFACE AG SERPL QL IA: NONREACTIVE
HCV AB SERPL QL IA: NONREACTIVE
IRON SATN MFR SERPL: 22 %
IRON SERPL-MCNC: 62 UG/DL
KETONES UR STRIP.AUTO-MCNC: NEGATIVE MG/DL
LEUKOCYTE ESTERASE UR QL STRIP.AUTO: NEGATIVE
NITRITE UR QL STRIP.AUTO: NEGATIVE
PH UR STRIP.AUTO: 7 [PH] (ref 5–8)
PROT UR STRIP.AUTO-MCNC: NEGATIVE MG/DL
RBC UR QL AUTO: NEGATIVE
SP GR UR STRIP.AUTO: 1.03 (ref 1–1.03)
TIBC SERPL-MCNC: 280 UG/DL (ref 240–450)
TRANSFERRIN SERPL-MCNC: 188 MG/DL (ref 200–360)
UROBILINOGEN UR STRIP.AUTO-MCNC: <2 MG/DL
VIT B12 SERPL-MCNC: 474 PG/ML (ref 193–986)

## 2023-03-06 PROCEDURE — 86147 CARDIOLIPIN ANTIBODY EA IG: CPT

## 2023-03-06 PROCEDURE — 81001 URINALYSIS AUTO W/SCOPE: CPT

## 2023-03-06 PROCEDURE — 87340 HEPATITIS B SURFACE AG IA: CPT

## 2023-03-06 PROCEDURE — 86706 HEP B SURFACE ANTIBODY: CPT

## 2023-03-06 PROCEDURE — 86235 NUCLEAR ANTIGEN ANTIBODY: CPT

## 2023-03-06 PROCEDURE — 85732 THROMBOPLASTIN TIME PARTIAL: CPT

## 2023-03-06 PROCEDURE — 86803 HEPATITIS C AB TEST: CPT

## 2023-03-06 PROCEDURE — 82607 VITAMIN B-12: CPT

## 2023-03-06 PROCEDURE — 82728 ASSAY OF FERRITIN: CPT

## 2023-03-06 PROCEDURE — 86704 HEP B CORE ANTIBODY TOTAL: CPT

## 2023-03-06 PROCEDURE — 86480 TB TEST CELL IMMUN MEASURE: CPT

## 2023-03-06 PROCEDURE — 86225 DNA ANTIBODY NATIVE: CPT

## 2023-03-06 PROCEDURE — 86256 FLUORESCENT ANTIBODY TITER: CPT

## 2023-03-06 PROCEDURE — 86038 ANTINUCLEAR ANTIBODIES: CPT

## 2023-03-06 PROCEDURE — 86039 ANTINUCLEAR ANTIBODIES (ANA): CPT

## 2023-03-06 PROCEDURE — 83550 IRON BINDING TEST: CPT

## 2023-03-06 PROCEDURE — 36415 COLL VENOUS BLD VENIPUNCTURE: CPT

## 2023-03-06 PROCEDURE — 85610 PROTHROMBIN TIME: CPT

## 2023-03-06 PROCEDURE — 86146 BETA-2 GLYCOPROTEIN ANTIBODY: CPT

## 2023-03-06 PROCEDURE — 85705 THROMBOPLASTIN INHIBITION: CPT

## 2023-03-06 PROCEDURE — 85613 RUSSELL VIPER VENOM DILUTED: CPT

## 2023-03-06 PROCEDURE — 83540 ASSAY OF IRON: CPT

## 2023-03-07 ENCOUNTER — OFFICE VISIT (OUTPATIENT)
Dept: RHEUMATOLOGY | Facility: CLINIC | Age: 43
End: 2023-03-07
Payer: COMMERCIAL

## 2023-03-07 VITALS
OXYGEN SATURATION: 99 % | BODY MASS INDEX: 36.07 KG/M2 | SYSTOLIC BLOOD PRESSURE: 116 MMHG | RESPIRATION RATE: 16 BRPM | TEMPERATURE: 97 F | HEIGHT: 62 IN | WEIGHT: 196 LBS | HEART RATE: 108 BPM | DIASTOLIC BLOOD PRESSURE: 76 MMHG

## 2023-03-07 DIAGNOSIS — M35.00 SJOGREN SYNDROME, UNSPECIFIED (HCC): ICD-10-CM

## 2023-03-07 DIAGNOSIS — I73.00 RAYNAUD'S DISEASE WITHOUT GANGRENE: ICD-10-CM

## 2023-03-07 DIAGNOSIS — Z51.81 THERAPEUTIC DRUG MONITORING: ICD-10-CM

## 2023-03-07 DIAGNOSIS — K11.7 XEROSTOMIA: ICD-10-CM

## 2023-03-07 DIAGNOSIS — M35.9 UNDIFFERENTIATED CONNECTIVE TISSUE DISEASE (HCC): Primary | ICD-10-CM

## 2023-03-07 LAB
B2 GLYCOPROT1 IGG SERPL IA-ACNC: 2.7 U/ML (ref ?–7)
B2 GLYCOPROT1 IGM SERPL IA-ACNC: <2.4 U/ML (ref ?–7)
CARDIOLIPIN IGG SERPL-ACNC: 2.8 GPL (ref ?–10)
CARDIOLIPIN IGM SERPL-ACNC: 2 MPL (ref ?–10)
DSDNA IGG SERPL IA-ACNC: 2.7 IU/ML
ENA AB SER QL IA: 3 UG/L
ENA AB SER QL IA: POSITIVE
NUCLEAR IGG TITR SER IF: POSITIVE {TITER}

## 2023-03-07 PROCEDURE — 3008F BODY MASS INDEX DOCD: CPT | Performed by: INTERNAL MEDICINE

## 2023-03-07 PROCEDURE — 3074F SYST BP LT 130 MM HG: CPT | Performed by: INTERNAL MEDICINE

## 2023-03-07 PROCEDURE — 99214 OFFICE O/P EST MOD 30 MIN: CPT | Performed by: INTERNAL MEDICINE

## 2023-03-07 PROCEDURE — 3078F DIAST BP <80 MM HG: CPT | Performed by: INTERNAL MEDICINE

## 2023-03-07 RX ORDER — HYDROXYCHLOROQUINE SULFATE 200 MG/1
200 TABLET, FILM COATED ORAL 2 TIMES DAILY
Qty: 180 TABLET | Refills: 1 | Status: SHIPPED | OUTPATIENT
Start: 2023-03-07

## 2023-03-08 DIAGNOSIS — G43.019 INTRACTABLE MIGRAINE WITHOUT AURA AND WITHOUT STATUS MIGRAINOSUS: Primary | ICD-10-CM

## 2023-03-08 LAB
M TB IFN-G CD4+ T-CELLS BLD-ACNC: 0.03 IU/ML
M TB TUBERC IFN-G BLD QL: NEGATIVE
M TB TUBERC IGNF/MITOGEN IGNF CONTROL: >10 IU/ML
QFT TB1 AG MINUS NIL: 0 IU/ML
QFT TB2 AG MINUS NIL: 0 IU/ML

## 2023-03-09 LAB
ANA NUCLEOLAR TITR SER IF: 320 {TITER}
CENTROMERE IGG SER-ACNC: 13.9 U/ML
ENA JO1 AB SER IA-ACNC: <0.3 U/ML
ENA RNP IGG SER IA-ACNC: 4.2 U/ML
ENA SCL70 IGG SER IA-ACNC: 1 U/ML
ENA SM IGG SER IA-ACNC: <0.7 U/ML
ENA SS-A IGG SER IA-ACNC: <0.4 U/ML
ENA SS-B IGG SER IA-ACNC: <0.4 U/ML
U1 SNRNP IGG SER IA-ACNC: 3.3 U/ML

## 2023-03-09 RX ORDER — AMITRIPTYLINE HYDROCHLORIDE 25 MG/1
25 TABLET, FILM COATED ORAL NIGHTLY
Qty: 30 TABLET | Refills: 2 | Status: SHIPPED | OUTPATIENT
Start: 2023-03-09

## 2023-03-17 LAB
APTT PPP: 29.6 SECONDS (ref 23.3–35.6)
LA 3 SCREEN W REFLEX-IMP: NEGATIVE
PROTHROMBIN TIME: 12.1 SECONDS (ref 11.6–14.8)
SCREEN DRVVT: 1.08 S (ref 0–1.29)
SCREEN DRVVT: NEGATIVE S
STACLOT LA DELTA: 4.1 SECONDS (ref ?–8)

## 2023-03-24 ENCOUNTER — TELEPHONE (OUTPATIENT)
Dept: FAMILY MEDICINE CLINIC | Facility: CLINIC | Age: 43
End: 2023-03-24

## 2023-03-29 ENCOUNTER — PATIENT MESSAGE (OUTPATIENT)
Dept: FAMILY MEDICINE CLINIC | Facility: CLINIC | Age: 43
End: 2023-03-29

## 2023-03-29 DIAGNOSIS — F41.1 GAD (GENERALIZED ANXIETY DISORDER): ICD-10-CM

## 2023-03-30 ENCOUNTER — TELEPHONE (OUTPATIENT)
Dept: FAMILY MEDICINE CLINIC | Facility: CLINIC | Age: 43
End: 2023-03-30

## 2023-03-30 RX ORDER — FLUOXETINE HYDROCHLORIDE 60 MG/1
60 TABLET, FILM COATED ORAL; ORAL DAILY
Qty: 90 TABLET | Refills: 1 | Status: SHIPPED | OUTPATIENT
Start: 2023-03-30 | End: 2023-04-29

## 2023-03-30 NOTE — TELEPHONE ENCOUNTER
From: Chely Pitt  To: Yulia Botello PA-C  Sent: 3/29/2023 3:15 PM CDT  Subject: Fluoxetine 60mg Tablet    Hi Raheem Serna,  I received a message from [employer] that they sent you a request for an Insurance Authorization for the 60mg tablet. I don't know why this is an issue since it's the generic, but are you able to submit that authorization? If they need a reason, maybe you can mention that I am having difficulty with swallowing the 3 capsules due to esophageal dysmotility?   Thanks,  Hudson Uriarte

## 2023-03-30 NOTE — TELEPHONE ENCOUNTER
Phoned [facility] as well as the patient. Explained to both we attempted PA but plan states one is not needed. Jere Curling is not able to assist with this. Patient is in agreement to sending script to local [facility]. We will see if we have better luck with sending to local. Sent.

## 2023-04-07 ENCOUNTER — PATIENT MESSAGE (OUTPATIENT)
Dept: NEUROLOGY | Facility: CLINIC | Age: 43
End: 2023-04-07

## 2023-05-04 PROBLEM — F43.23 REACTION, ADJUSTMENT, WITH ANXIOUS, DEPRESSED MOOD: Status: RESOLVED | Noted: 2021-12-07 | Resolved: 2023-05-04
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
## 2023-05-04 PROBLEM — S16.1XXA ACUTE STRAIN OF NECK MUSCLE: Status: RESOLVED | Noted: 2020-12-31 | Resolved: 2023-05-04

## 2023-05-27 ENCOUNTER — E-VISIT (OUTPATIENT)
Dept: TELEHEALTH | Age: 43
End: 2023-05-27

## 2023-05-27 DIAGNOSIS — N76.0 ACUTE VAGINITIS: Primary | ICD-10-CM

## 2023-05-27 RX ORDER — FLUOXETINE HYDROCHLORIDE 60 MG/1
1 TABLET, FILM COATED ORAL; ORAL DAILY
COMMUNITY
Start: 2023-05-24

## 2023-05-31 ENCOUNTER — HOSPITAL ENCOUNTER (OUTPATIENT)
Dept: MAMMOGRAPHY | Age: 43
Discharge: HOME | End: 2023-05-31
Attending: FAMILY MEDICINE
Payer: COMMERCIAL

## 2023-05-31 ENCOUNTER — OFFICE VISIT (OUTPATIENT)
Dept: NEUROLOGY | Facility: CLINIC | Age: 43
End: 2023-05-31
Payer: COMMERCIAL

## 2023-05-31 ENCOUNTER — LAB ENCOUNTER (OUTPATIENT)
Dept: LAB | Age: 43
End: 2023-05-31
Attending: FAMILY MEDICINE
Payer: COMMERCIAL

## 2023-05-31 VITALS
RESPIRATION RATE: 16 BRPM | SYSTOLIC BLOOD PRESSURE: 122 MMHG | DIASTOLIC BLOOD PRESSURE: 68 MMHG | BODY MASS INDEX: 35 KG/M2 | WEIGHT: 192.19 LBS | HEART RATE: 93 BPM

## 2023-05-31 DIAGNOSIS — Z12.31 VISIT FOR SCREENING MAMMOGRAM: ICD-10-CM

## 2023-05-31 DIAGNOSIS — R76.8 DS DNA ANTIBODY POSITIVE: ICD-10-CM

## 2023-05-31 DIAGNOSIS — R76.8 CENTROMERE ANTIBODY POSITIVE: ICD-10-CM

## 2023-05-31 DIAGNOSIS — G43.709 CHRONIC MIGRAINE W/O AURA W/O STATUS MIGRAINOSUS, NOT INTRACTABLE: Primary | ICD-10-CM

## 2023-05-31 PROCEDURE — 86235 NUCLEAR ANTIGEN ANTIBODY: CPT

## 2023-05-31 PROCEDURE — 86225 DNA ANTIBODY NATIVE: CPT

## 2023-05-31 PROCEDURE — 36415 COLL VENOUS BLD VENIPUNCTURE: CPT

## 2023-05-31 PROCEDURE — 77067 SCR MAMMO BI INCL CAD: CPT | Performed by: FAMILY MEDICINE

## 2023-05-31 PROCEDURE — 86038 ANTINUCLEAR ANTIBODIES: CPT

## 2023-05-31 PROCEDURE — 64615 CHEMODENERV MUSC MIGRAINE: CPT | Performed by: OTHER

## 2023-05-31 PROCEDURE — 86256 FLUORESCENT ANTIBODY TITER: CPT

## 2023-05-31 PROCEDURE — 77063 BREAST TOMOSYNTHESIS BI: CPT | Performed by: FAMILY MEDICINE

## 2023-05-31 PROCEDURE — 3078F DIAST BP <80 MM HG: CPT | Performed by: OTHER

## 2023-05-31 PROCEDURE — 3074F SYST BP LT 130 MM HG: CPT | Performed by: OTHER

## 2023-05-31 RX ORDER — AMITRIPTYLINE HYDROCHLORIDE 25 MG/1
25 TABLET, FILM COATED ORAL NIGHTLY
Qty: 30 TABLET | Refills: 5 | Status: SHIPPED | OUTPATIENT
Start: 2023-05-31

## 2023-05-31 NOTE — PROGRESS NOTES
Paperwork noting that patient may bear financial responsibility for procedure(s) performed in clinic today signed prior to proceeding with procedure(s). Furthermore, patient notified that they should contact their insurer to verify that your procedure/test has been approved and is a COVERED benefit. Although the [facility] staff does its due diligence, the insurance carrier gives the disclaimer that \"Although the procedure is authorized, this does not guarantee payment. \"    Ultimately the patient is responsible for payment. Botox is:  [] Buy and Bill  [x] Patient Supplied        Botox Reauthorization Questions:  1. Has the patient experienced a reduction in frequency of migraines since starting Botox? yes  a. If yes, by what percentage? 50%  2. Has the intensity of migraines decreased since starting Botox? yes  a. If yes, by what percentage?  50%

## 2023-06-01 ENCOUNTER — TELEPHONE (OUTPATIENT)
Dept: SURGERY | Facility: CLINIC | Age: 43
End: 2023-06-01

## 2023-06-01 NOTE — TELEPHONE ENCOUNTER
Pt sechulded first available Botox appointment during check out 9/27/2023; pt is due for Botox 8/31/23 please advise with new Botox appointment time

## 2023-06-05 LAB
CENTROMERE IGG SER-ACNC: 15.3 U/ML
DSDNA IGG SERPL IA-ACNC: 3.2 IU/ML
ENA AB SER QL IA: 3 UG/L
ENA AB SER QL IA: POSITIVE
ENA JO1 AB SER IA-ACNC: <0.3 U/ML
ENA RNP IGG SER IA-ACNC: 4 U/ML
ENA SCL70 IGG SER IA-ACNC: <0.6 U/ML
ENA SM IGG SER IA-ACNC: <0.7 U/ML
ENA SS-A IGG SER IA-ACNC: <0.4 U/ML
ENA SS-B IGG SER IA-ACNC: <0.4 U/ML
U1 SNRNP IGG SER IA-ACNC: 2.8 U/ML

## 2023-06-06 LAB — DSDNA CRITHIDIA LUCILIAE IFA: NEGATIVE

## 2023-06-06 NOTE — PROGRESS NOTES
Antibodies still present for the antibody centromere and the same results for the connective tissue screening.   Follow-up with Dr. Emerson Candelario to discuss

## 2023-06-12 ENCOUNTER — OFFICE VISIT (OUTPATIENT)
Dept: RHEUMATOLOGY | Facility: CLINIC | Age: 43
End: 2023-06-12
Payer: COMMERCIAL

## 2023-06-12 VITALS
WEIGHT: 193 LBS | BODY MASS INDEX: 35.51 KG/M2 | HEIGHT: 62 IN | RESPIRATION RATE: 16 BRPM | TEMPERATURE: 98 F | DIASTOLIC BLOOD PRESSURE: 70 MMHG | HEART RATE: 93 BPM | OXYGEN SATURATION: 96 % | SYSTOLIC BLOOD PRESSURE: 110 MMHG

## 2023-06-12 DIAGNOSIS — K11.7 XEROSTOMIA: ICD-10-CM

## 2023-06-12 DIAGNOSIS — M35.00 SJOGREN SYNDROME, UNSPECIFIED (HCC): ICD-10-CM

## 2023-06-12 DIAGNOSIS — B37.9 YEAST INFECTION: ICD-10-CM

## 2023-06-12 DIAGNOSIS — M35.9 UNDIFFERENTIATED CONNECTIVE TISSUE DISEASE (HCC): Primary | ICD-10-CM

## 2023-06-12 PROCEDURE — 3074F SYST BP LT 130 MM HG: CPT | Performed by: INTERNAL MEDICINE

## 2023-06-12 PROCEDURE — 3008F BODY MASS INDEX DOCD: CPT | Performed by: INTERNAL MEDICINE

## 2023-06-12 PROCEDURE — 99214 OFFICE O/P EST MOD 30 MIN: CPT | Performed by: INTERNAL MEDICINE

## 2023-06-12 PROCEDURE — 3078F DIAST BP <80 MM HG: CPT | Performed by: INTERNAL MEDICINE

## 2023-06-12 RX ORDER — PILOCARPINE HYDROCHLORIDE 5 MG/1
TABLET, FILM COATED ORAL
Qty: 249 TABLET | Refills: 1 | Status: SHIPPED | OUTPATIENT
Start: 2023-06-12 | End: 2023-09-10

## 2023-06-12 RX ORDER — HYDROXYCHLOROQUINE SULFATE 200 MG/1
200 TABLET, FILM COATED ORAL 2 TIMES DAILY
Qty: 180 TABLET | Refills: 2 | Status: SHIPPED | OUTPATIENT
Start: 2023-06-12

## 2023-06-12 RX ORDER — FLUCONAZOLE 150 MG/1
150 TABLET ORAL DAILY PRN
Qty: 2 TABLET | Refills: 0 | Status: SHIPPED | OUTPATIENT
Start: 2023-06-12

## 2023-06-28 DIAGNOSIS — G43.019 INTRACTABLE MIGRAINE WITHOUT AURA AND WITHOUT STATUS MIGRAINOSUS: ICD-10-CM

## 2023-06-28 RX ORDER — RIZATRIPTAN BENZOATE 10 MG/1
TABLET ORAL
Qty: 12 TABLET | Refills: 0 | Status: SHIPPED | OUTPATIENT
Start: 2023-06-28

## 2023-06-29 ENCOUNTER — PATIENT MESSAGE (OUTPATIENT)
Dept: FAMILY MEDICINE CLINIC | Facility: CLINIC | Age: 43
End: 2023-06-29

## 2023-06-29 DIAGNOSIS — R92.2 DENSE BREAST TISSUE ON MAMMOGRAM: Primary | ICD-10-CM

## 2023-06-29 DIAGNOSIS — F41.1 GAD (GENERALIZED ANXIETY DISORDER): ICD-10-CM

## 2023-06-29 DIAGNOSIS — Z80.3 FH: BREAST CANCER IN FIRST DEGREE RELATIVE: ICD-10-CM

## 2023-06-29 RX ORDER — ALPRAZOLAM 0.5 MG/1
TABLET ORAL
Qty: 10 TABLET | Refills: 0 | Status: SHIPPED | OUTPATIENT
Start: 2023-06-29

## 2023-07-23 DIAGNOSIS — G43.019 INTRACTABLE MIGRAINE WITHOUT AURA AND WITHOUT STATUS MIGRAINOSUS: ICD-10-CM

## 2023-07-24 RX ORDER — RIZATRIPTAN BENZOATE 10 MG/1
TABLET ORAL
Qty: 12 TABLET | Refills: 0 | Status: SHIPPED | OUTPATIENT
Start: 2023-07-24

## 2023-07-24 NOTE — TELEPHONE ENCOUNTER
Medication: RIZATRIPTAN BENZOATE 10 MG Oral Tab      Date of last refill: 6/28/23 (#12/0)  [place] 8/62[place]     Last office visit: 12/02/22  Due back to clinic per last office note:  12 weeks    Date next office visit scheduled:    Future Appointments   Date Time Provider Brina Nadia   8/16/2023 10:30 AM Verna Madrigal PA-C [facility] [place]   9/27/2023 10:20 AM Ross Hutchins MD [facility] [place]   10/19/2023 10:30 AM Rupert Diaz MD [facility] [address]           Last OV note recommendation:    ASSESSMENT/PLAN:   Chronic migraine w/o aura w/o status migrainosus, not intractable  (primary encounter diagnosis)  Myoclonic jerking     Chronic migraines, well controlled on Botox and Amitriptyline  Continue current management     Myoclonic jerking, nonspecific  Could be stress or anxiety related  Obtain EEG study    Follow up in about 12 weeks for Botox injections

## 2023-08-17 DIAGNOSIS — G43.019 INTRACTABLE MIGRAINE WITHOUT AURA AND WITHOUT STATUS MIGRAINOSUS: ICD-10-CM

## 2023-08-17 NOTE — TELEPHONE ENCOUNTER
Medication: RIZATRIPTAN BENZOATE 10 MG Oral Tab     Date of last refill: 07/24/23 (12/0)  [place] 89/66/81    Last office visit: 05/31/23  Due back to clinic per last office note:  12 weeks  Date next office visit scheduled:    Future Appointments   Date Time Provider Brina Zuniga   8/18/2023  8:00 AM Verna Madrigal PA-C [facility] [place]   9/27/2023 10:20 AM Ross Hutchins MD EN[facility] [place]   10/19/2023 10:30 AM Rupert Diaz MD [facility] [address]        Last OV note recommendation:     PLAN:      Chronic migraines, well controlled on Botox and Amitriptyline  Continue current management     Myoclonic jerking, nonspecific  Could be stress or anxiety related

## 2023-08-18 RX ORDER — RIZATRIPTAN BENZOATE 10 MG/1
TABLET ORAL
Qty: 12 TABLET | Refills: 0 | Status: SHIPPED | OUTPATIENT
Start: 2023-08-18

## 2023-08-20 PROBLEM — M54.42 ACUTE LEFT-SIDED LOW BACK PAIN WITH LEFT-SIDED SCIATICA: Status: RESOLVED | Noted: 2020-12-31 | Resolved: 2023-08-20

## 2023-08-22 NOTE — TELEPHONE ENCOUNTER
Pt called back to move BOTOX appt from 11/9/2022 to 9/2/2022. Ketoconazole Counseling:   Patient counseled regarding improving absorption with orange juice.  Adverse effects include but are not limited to breast enlargement, headache, diarrhea, nausea, upset stomach, liver function test abnormalities, taste disturbance, and stomach pain.  There is a rare possibility of liver failure that can occur when taking ketoconazole. The patient understands that monitoring of LFTs may be required, especially at baseline. The patient verbalized understanding of the proper use and possible adverse effects of ketoconazole.  All of the patient's questions and concerns were addressed.

## 2023-08-24 ENCOUNTER — APPOINTMENT (OUTPATIENT)
Dept: URBAN - METROPOLITAN AREA CLINIC 247 | Age: 43
Setting detail: DERMATOLOGY
End: 2023-08-24

## 2023-08-24 DIAGNOSIS — L81.4 OTHER MELANIN HYPERPIGMENTATION: ICD-10-CM

## 2023-08-24 DIAGNOSIS — L81.3 CAFÉ AU LAIT SPOTS: ICD-10-CM

## 2023-08-24 DIAGNOSIS — M34.1 CR(E)ST SYNDROME: ICD-10-CM

## 2023-08-24 DIAGNOSIS — D18.0 HEMANGIOMA: ICD-10-CM

## 2023-08-24 DIAGNOSIS — D22 MELANOCYTIC NEVI: ICD-10-CM

## 2023-08-24 PROBLEM — D18.01 HEMANGIOMA OF SKIN AND SUBCUTANEOUS TISSUE: Status: ACTIVE | Noted: 2023-08-24

## 2023-08-24 PROBLEM — D22.5 MELANOCYTIC NEVI OF TRUNK: Status: ACTIVE | Noted: 2023-08-24

## 2023-08-24 PROCEDURE — 99213 OFFICE O/P EST LOW 20 MIN: CPT

## 2023-08-24 PROCEDURE — OTHER COUNSELING: OTHER

## 2023-08-24 ASSESSMENT — LOCATION ZONE DERM
LOCATION ZONE: LEG
LOCATION ZONE: TRUNK

## 2023-08-24 ASSESSMENT — LOCATION SIMPLE DESCRIPTION DERM
LOCATION SIMPLE: RIGHT UPPER BACK
LOCATION SIMPLE: LEFT THIGH
LOCATION SIMPLE: LEFT UPPER BACK

## 2023-08-24 ASSESSMENT — LOCATION DETAILED DESCRIPTION DERM
LOCATION DETAILED: RIGHT INFERIOR UPPER BACK
LOCATION DETAILED: RIGHT MID-UPPER BACK
LOCATION DETAILED: RIGHT SUPERIOR UPPER BACK
LOCATION DETAILED: LEFT ANTERIOR DISTAL THIGH
LOCATION DETAILED: LEFT SUPERIOR UPPER BACK

## 2023-08-26 DIAGNOSIS — K11.7 XEROSTOMIA: ICD-10-CM

## 2023-08-26 DIAGNOSIS — M35.00 SJOGREN SYNDROME, UNSPECIFIED (HCC): ICD-10-CM

## 2023-08-26 DIAGNOSIS — M35.9 UNDIFFERENTIATED CONNECTIVE TISSUE DISEASE (HCC): ICD-10-CM

## 2023-08-28 RX ORDER — HYDROXYCHLOROQUINE SULFATE 200 MG/1
200 TABLET, FILM COATED ORAL 2 TIMES DAILY
Qty: 180 TABLET | Refills: 0 | OUTPATIENT
Start: 2023-08-28

## 2023-08-28 NOTE — TELEPHONE ENCOUNTER
Future Appointments   Date Time Provider Brina Zuniga   9/27/2023 10:20 AM Megan Baker MD [facility]   10/19/2023 10:30 AM Sandy Ayon MD [facility] [address]     Last office visit: 6/12/2023    Last fill: 6/12/2023 180 tab, 2 refills    Refill declined since there are refills left on script sent 6/12/2023

## 2023-09-01 DIAGNOSIS — M35.9 UNDIFFERENTIATED CONNECTIVE TISSUE DISEASE (HCC): Primary | ICD-10-CM

## 2023-09-01 DIAGNOSIS — M35.00 SJOGREN SYNDROME, UNSPECIFIED (HCC): ICD-10-CM

## 2023-09-01 RX ORDER — HYDROXYCHLOROQUINE SULFATE 200 MG/1
200 TABLET, FILM COATED ORAL 2 TIMES DAILY
Qty: 180 TABLET | Refills: 0 | Status: SHIPPED | OUTPATIENT
Start: 2023-09-01

## 2023-09-13 ENCOUNTER — TELEPHONE (OUTPATIENT)
Dept: NEUROLOGY | Facility: CLINIC | Age: 43
End: 2023-09-13

## 2023-09-16 DIAGNOSIS — G43.019 INTRACTABLE MIGRAINE WITHOUT AURA AND WITHOUT STATUS MIGRAINOSUS: ICD-10-CM

## 2023-09-18 RX ORDER — RIZATRIPTAN BENZOATE 10 MG/1
TABLET ORAL
Qty: 12 TABLET | Refills: 0 | Status: SHIPPED | OUTPATIENT
Start: 2023-09-18 | End: 2023-09-27

## 2023-09-18 NOTE — TELEPHONE ENCOUNTER
Medication: Rizatriptan Benzoate 10 MG Oral Tab      Date of last refill: 8/18/23 (#12/0)  [place] 4[place]     Last office visit: 9/2/22  (5/31/23 for Botox)  Due back to clinic per last office note: 12 weeks  Date next office visit scheduled:    Future Appointments   Date Time Provider Brina Zuniga   9/27/2023 10:20 AM Elsi Maher MD [facility] [place]   10/19/2023 10:30 AM Bridgette Wright MD [facility] [address]           Last OV note recommendation:    ASSESSMENT/PLAN:      (G43.709) Chronic migraine w/o aura w/o status migrainosus, not intractable  (primary encounter diagnosis)  Plan: onabotulinumtoxinA (Botox) injection 200 Units     Patient presented to establish care for migraines. She is on Botox therapy and working well  Last injection was about 3 months ago.   We will do her Botox injection today     Advised to increase amitriptyline to 25 mg to see for any additional benefit  Patient is aware of possible interaction with fluoxetine and been tolerating it well so far     Change Ubrelvy 100 mg as needed for acute relief

## 2023-09-27 ENCOUNTER — TELEPHONE (OUTPATIENT)
Dept: NEUROLOGY | Facility: CLINIC | Age: 43
End: 2023-09-27

## 2023-09-27 ENCOUNTER — OFFICE VISIT (OUTPATIENT)
Dept: NEUROLOGY | Facility: CLINIC | Age: 43
End: 2023-09-27
Payer: COMMERCIAL

## 2023-09-27 VITALS — DIASTOLIC BLOOD PRESSURE: 55 MMHG | HEART RATE: 89 BPM | SYSTOLIC BLOOD PRESSURE: 98 MMHG

## 2023-09-27 DIAGNOSIS — G43.019 INTRACTABLE MIGRAINE WITHOUT AURA AND WITHOUT STATUS MIGRAINOSUS: ICD-10-CM

## 2023-09-27 DIAGNOSIS — G43.709 CHRONIC MIGRAINE W/O AURA W/O STATUS MIGRAINOSUS, NOT INTRACTABLE: Primary | ICD-10-CM

## 2023-09-27 PROCEDURE — 3074F SYST BP LT 130 MM HG: CPT | Performed by: OTHER

## 2023-09-27 PROCEDURE — 64615 CHEMODENERV MUSC MIGRAINE: CPT | Performed by: OTHER

## 2023-09-27 PROCEDURE — 99213 OFFICE O/P EST LOW 20 MIN: CPT | Performed by: OTHER

## 2023-09-27 PROCEDURE — 3078F DIAST BP <80 MM HG: CPT | Performed by: OTHER

## 2023-09-27 RX ORDER — RIZATRIPTAN BENZOATE 10 MG/1
TABLET ORAL
Qty: 12 TABLET | Refills: 5 | Status: SHIPPED | OUTPATIENT
Start: 2023-09-27

## 2023-09-27 RX ORDER — AMITRIPTYLINE HYDROCHLORIDE 25 MG/1
25 TABLET, FILM COATED ORAL NIGHTLY
Qty: 30 TABLET | Refills: 5 | Status: SHIPPED | OUTPATIENT
Start: 2023-09-27

## 2023-09-27 RX ORDER — AMITRIPTYLINE HYDROCHLORIDE 25 MG/1
25 TABLET, FILM COATED ORAL NIGHTLY
Qty: 30 TABLET | Refills: 5 | Status: SHIPPED | OUTPATIENT
Start: 2023-09-27 | End: 2023-09-27

## 2023-09-27 RX ORDER — RIZATRIPTAN BENZOATE 10 MG/1
TABLET ORAL
Qty: 12 TABLET | Refills: 5 | Status: SHIPPED | OUTPATIENT
Start: 2023-09-27 | End: 2023-09-27

## 2023-09-27 NOTE — PROGRESS NOTES
Paperwork noting that patient may bear financial responsibility for procedure(s) performed in clinic today signed prior to proceeding with procedure(s). Furthermore, patient notified that they should contact their insurer to verify that your procedure/test has been approved and is a COVERED benefit. Although the [facility] staff does its due diligence, the insurance carrier gives the disclaimer that \"Although the procedure is authorized, this does not guarantee payment. \"    Ultimately the patient is responsible for payment. Botox is:  [] Buy and Bill  [x] Patient Supplied      Botox Reauthorization Questions:  Has the patient experienced a reduction in frequency of migraines since starting Botox? yes  If yes, by what percentage? 75%  Has the intensity of migraines decreased since starting Botox?  yes  If yes, by what percentage? 75%
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
## 2023-09-27 NOTE — PATIENT INSTRUCTIONS
Paperwork noting that patient may bear financial responsibility for procedure(s) performed in clinic today signed prior to proceeding with procedure(s). Furthermore, patient notified that they should contact their insurer to verify that your procedure/test has been approved and is a COVERED benefit. Although the [facility] staff does its due diligence, the insurance carrier gives the disclaimer that \"Although the procedure is authorized, this does not guarantee payment. \"    Ultimately the patient is responsible for payment.     Botox is:  [] Buy and Bill  [x] Patient Supplied

## 2023-09-27 NOTE — TELEPHONE ENCOUNTER
Patient scheduled first available but needs end of December for Botox Future Appointments 1/17/2024  10:00 AM   Dallas Duque MD [facility] [place]

## 2023-10-11 NOTE — TELEPHONE ENCOUNTER
Was not prescribed from DR. Duque    Medication: FLUOXETINE HCL 60 MG Oral Tab     Date of last refill: 05/24/23   [place]

## 2023-10-15 DIAGNOSIS — G43.019 INTRACTABLE MIGRAINE WITHOUT AURA AND WITHOUT STATUS MIGRAINOSUS: ICD-10-CM

## 2023-10-17 RX ORDER — RIZATRIPTAN BENZOATE 10 MG/1
TABLET ORAL
Qty: 12 TABLET | Refills: 5 | OUTPATIENT
Start: 2023-10-17

## 2023-10-17 RX ORDER — FLUOXETINE HYDROCHLORIDE 60 MG/1
1 TABLET, FILM COATED ORAL; ORAL DAILY
Qty: 30 TABLET | Refills: 0 | OUTPATIENT
Start: 2023-10-17

## 2023-10-17 NOTE — TELEPHONE ENCOUNTER
Spoke with the patient who states she is not using the [employer] and to disregard their refill request.       Medication: RIZATRIPTAN BENZOATE 10 MG Oral Tab      Date of last refill: 09/27/2023 (#12/5)  [place]     Last office visit: 09/27/2023  Due back to clinic per last office note:  Around 12/20/2023  Date next office visit scheduled:    Future Appointments   Date Time Provider Brina Zuniga   10/19/2023 10:30 AM Rupert Diaz MD [facility] [address]   12/27/2023  2:20 PM Bryce Duque MD [facility] [place]           Last OV note recommendation:    ASSESSMENT/PLAN:   Chronic migraine w/o aura w/o status migrainosus, not intractable  (primary encounter diagnosis)  Intractable migraine without aura and without status migrainosus     Stable overall  Continue Botox therapy     Continue Amitriptyline and Maxalt as needed     See orders and medications filed with this encounter. The patient indicates understanding of these issues and agrees with the plan. No orders of the defined types were placed in this encounter.

## 2023-10-19 ENCOUNTER — OFFICE VISIT (OUTPATIENT)
Dept: RHEUMATOLOGY | Facility: CLINIC | Age: 43
End: 2023-10-19
Payer: COMMERCIAL

## 2023-10-19 VITALS
HEIGHT: 62 IN | OXYGEN SATURATION: 97 % | HEART RATE: 110 BPM | SYSTOLIC BLOOD PRESSURE: 110 MMHG | DIASTOLIC BLOOD PRESSURE: 70 MMHG | RESPIRATION RATE: 16 BRPM | WEIGHT: 183 LBS | BODY MASS INDEX: 33.68 KG/M2 | TEMPERATURE: 98 F

## 2023-10-19 DIAGNOSIS — K21.9 GASTROESOPHAGEAL REFLUX DISEASE, UNSPECIFIED WHETHER ESOPHAGITIS PRESENT: ICD-10-CM

## 2023-10-19 DIAGNOSIS — Z11.59 NEED FOR HEPATITIS B SCREENING TEST: ICD-10-CM

## 2023-10-19 DIAGNOSIS — R76.8 POSITIVE ANA (ANTINUCLEAR ANTIBODY): ICD-10-CM

## 2023-10-19 DIAGNOSIS — Z11.1 SCREENING FOR TUBERCULOSIS: ICD-10-CM

## 2023-10-19 DIAGNOSIS — K22.4 ESOPHAGEAL DYSMOTILITY: ICD-10-CM

## 2023-10-19 DIAGNOSIS — R76.8 CENTROMERE ANTIBODY POSITIVE: ICD-10-CM

## 2023-10-19 DIAGNOSIS — K11.1 PAROTID GLAND ENLARGEMENT: Primary | ICD-10-CM

## 2023-10-19 DIAGNOSIS — R06.02 SOB (SHORTNESS OF BREATH): ICD-10-CM

## 2023-10-19 DIAGNOSIS — Z11.59 NEED FOR HEPATITIS C SCREENING TEST: ICD-10-CM

## 2023-10-19 DIAGNOSIS — M35.9 UNDIFFERENTIATED CONNECTIVE TISSUE DISEASE (HCC): ICD-10-CM

## 2023-10-19 DIAGNOSIS — R53.83 OTHER FATIGUE: ICD-10-CM

## 2023-10-19 DIAGNOSIS — R76.8 DS DNA ANTIBODY POSITIVE: ICD-10-CM

## 2023-10-19 DIAGNOSIS — R05.9 COUGH, UNSPECIFIED TYPE: ICD-10-CM

## 2023-10-19 DIAGNOSIS — Z51.81 THERAPEUTIC DRUG MONITORING: ICD-10-CM

## 2023-10-19 PROCEDURE — 3078F DIAST BP <80 MM HG: CPT | Performed by: INTERNAL MEDICINE

## 2023-10-19 PROCEDURE — 3008F BODY MASS INDEX DOCD: CPT | Performed by: INTERNAL MEDICINE

## 2023-10-19 PROCEDURE — 3074F SYST BP LT 130 MM HG: CPT | Performed by: INTERNAL MEDICINE

## 2023-10-19 PROCEDURE — 99214 OFFICE O/P EST MOD 30 MIN: CPT | Performed by: INTERNAL MEDICINE

## 2023-10-19 RX ORDER — PREDNISONE 1 MG/1
5 TABLET ORAL
Qty: 150 TABLET | Refills: 0 | Status: SHIPPED | OUTPATIENT
Start: 2023-10-19

## 2023-10-19 NOTE — PATIENT INSTRUCTIONS
-repeat autoimmune blood work in March 2024  -continue hydroxychloroquine (plaquenil) 200 mg twice daily  -With the 1 mg prednisone tablets:  -Take 2 mg for a mildly symptomatic day  -Take 5 mg for a moderately symptomatic day  -Take 7 mg for a severe day. For significant dry mouth/Sjogren's: [place]:   Take 1/2 to 1 tablet (2.5 to 5 mg total) by mouth at bedtime for 7 days, THEN 1/2 to 1 tablet (2.5 to 5 mg total)  2 (two) times a day for 7 days, THEN 1/2 to 1 tablet (2.5 to 5 mg total)  3 (three) times daily.

## 2023-12-01 ENCOUNTER — TELEPHONE (OUTPATIENT)
Dept: NEUROLOGY | Facility: CLINIC | Age: 43
End: 2023-12-01

## 2023-12-01 DIAGNOSIS — G43.709 CHRONIC MIGRAINE W/O AURA W/O STATUS MIGRAINOSUS, NOT INTRACTABLE: Primary | ICD-10-CM

## 2023-12-01 RX ORDER — ONABOTULINUMTOXINA 200 [USP'U]/1
INJECTION, POWDER, LYOPHILIZED, FOR SOLUTION INTRADERMAL; INTRAMUSCULAR
Qty: 1 EACH | Refills: 3 | Status: SHIPPED | OUTPATIENT
Start: 2023-12-01 | End: 2024-01-03 | Stop reason: ALTCHOICE

## 2023-12-01 NOTE — TELEPHONE ENCOUNTER
New PA has been started and faxed to [facility]    Please send a prescription to [facility]  Pharmacy:    Botox 200 units, 3 refills      Si units to be administered by MD into multiple sites of head, neck and scalp every 3 months for chronic migraine prophylaxis.

## 2023-12-07 ENCOUNTER — TELEPHONE (OUTPATIENT)
Dept: NEUROLOGY | Facility: CLINIC | Age: 43
End: 2023-12-07

## 2023-12-07 NOTE — TELEPHONE ENCOUNTER
Called [facility] and spoke with Yvonne. The Botox will be delivered to the [facility] on 12/12/23. Patient gave consent.  Confirmed address and hours

## 2023-12-12 ENCOUNTER — PATIENT MESSAGE (OUTPATIENT)
Dept: FAMILY MEDICINE CLINIC | Facility: CLINIC | Age: 43
End: 2023-12-12

## 2023-12-12 ENCOUNTER — TELEPHONE (OUTPATIENT)
Dept: NEUROLOGY | Facility: CLINIC | Age: 43
End: 2023-12-12

## 2023-12-12 NOTE — TELEPHONE ENCOUNTER
Requesting refill on Vyvanse. LOV 8/18/2023. Due for 3 month F/U. No appointment scheduled at this time. PSR: Please assist with scheduling F/U. VV ok.

## 2023-12-12 NOTE — TELEPHONE ENCOUNTER
From: Janice Hinojosa  To: Karen Kennedy  Sent: 12/12/2023 2:06 PM CST  Subject: Vyvanse    Gareth Gaytan,  I am in need of a refill for my Vyvanse, but don't see it on my med list in MyChart to request it. Can you please send a refill to [facility] for GENERIC Vyvanse? My insurance will no longer cover the brand name since a generic is available.   Thanks,  Daniel Gross

## 2023-12-12 NOTE — TELEPHONE ENCOUNTER
Botox received in:  [facility], [address] [place] [address]  [address]     Lot No: [id number]  Exp Date: 03/2026  # Units: 200     Pt has Appt scheduled on 12/27 with Dr. Ozzie Bernardo. Botox given to JUNIOR Irizarry to be placed in fridge. Routed to PA team for delivery notification.

## 2023-12-13 DIAGNOSIS — K11.7 XEROSTOMIA: ICD-10-CM

## 2023-12-13 DIAGNOSIS — M35.00 SJOGREN SYNDROME, UNSPECIFIED (HCC): ICD-10-CM

## 2023-12-13 DIAGNOSIS — M35.9 UNDIFFERENTIATED CONNECTIVE TISSUE DISEASE (HCC): ICD-10-CM

## 2023-12-13 DIAGNOSIS — B37.9 YEAST INFECTION: ICD-10-CM

## 2023-12-13 RX ORDER — LISDEXAMFETAMINE DIMESYLATE CAPSULES 50 MG/1
50 CAPSULE ORAL EVERY MORNING
Qty: 30 CAPSULE | Refills: 0 | Status: SHIPPED | OUTPATIENT
Start: 2023-12-13 | End: 2023-12-15

## 2023-12-14 RX ORDER — FLUCONAZOLE 150 MG/1
150 TABLET ORAL DAILY PRN
Qty: 2 TABLET | Refills: 0 | Status: SHIPPED | OUTPATIENT
Start: 2023-12-14

## 2023-12-14 NOTE — TELEPHONE ENCOUNTER
LOV:   10/19/2023      Future Appointments   Date Time Provider Brina Zuniga   12/27/2023  2:20 PM Liliya Marin MD [facility] [place]   1/3/2024 11:30 AM Jabier Marino PA-C [facility] [place]       LF: 06/12/2023     QTY:   2 tablets   Refills:  0

## 2023-12-15 RX ORDER — LISDEXAMFETAMINE DIMESYLATE CAPSULES 50 MG/1
50 CAPSULE ORAL EVERY MORNING
Qty: 30 CAPSULE | Refills: 0 | Status: SHIPPED | OUTPATIENT
Start: 2023-12-15

## 2023-12-21 NOTE — TELEPHONE ENCOUNTER
Refused. We do no prescribe this medication. Medication: FLUOXETINE HCL 60 MG Oral Tab      Date of last refill:  (#/)  [place]     Last office visit: 09/27/2023  Due back to clinic per last office note:  Around 12/20/2023  Date next office visit scheduled:    Future Appointments   Date Time Provider Brina Zuniga   12/27/2023  2:20 PM Pau Staton MD [facility] [place]   1/3/2024 11:30 AM Garth Leon PA-C [facility] [place]           Last OV note recommendation:    ASSESSMENT/PLAN:   Chronic migraine w/o aura w/o status migrainosus, not intractable  (primary encounter diagnosis)  Intractable migraine without aura and without status migrainosus     Stable overall  Continue Botox therapy     Continue Amitriptyline and Maxalt as needed     See orders and medications filed with this encounter. The patient indicates understanding of these issues and agrees with the plan. No orders of the defined types were placed in this encounter.         Jeb Sheriff MD  [facility]

## 2023-12-22 RX ORDER — FLUOXETINE HYDROCHLORIDE 60 MG/1
1 TABLET, FILM COATED ORAL; ORAL DAILY
Qty: 30 TABLET | Refills: 0 | OUTPATIENT
Start: 2023-12-22

## 2024-01-03 ENCOUNTER — OFFICE VISIT (OUTPATIENT)
Dept: NEUROLOGY | Facility: CLINIC | Age: 44
End: 2024-01-03
Payer: COMMERCIAL

## 2024-01-03 VITALS
BODY MASS INDEX: 33 KG/M2 | RESPIRATION RATE: 16 BRPM | WEIGHT: 181 LBS | SYSTOLIC BLOOD PRESSURE: 122 MMHG | DIASTOLIC BLOOD PRESSURE: 66 MMHG | HEART RATE: 96 BPM

## 2024-01-03 DIAGNOSIS — G43.709 CHRONIC MIGRAINE W/O AURA W/O STATUS MIGRAINOSUS, NOT INTRACTABLE: ICD-10-CM

## 2024-01-03 PROCEDURE — 64615 CHEMODENERV MUSC MIGRAINE: CPT | Performed by: OTHER

## 2024-01-03 PROCEDURE — 3078F DIAST BP <80 MM HG: CPT | Performed by: OTHER

## 2024-01-03 PROCEDURE — 3074F SYST BP LT 130 MM HG: CPT | Performed by: OTHER

## 2024-01-03 NOTE — PROCEDURES
Procedure: Botox injection -chemodenervation  Consent: obtained after explanation of procedure detail, benefits and risks     Indication:   -chronic migraine patient who suffers 15 or more days with headache lasting 4 hours a day or longer.     Procedure description:  -Chronic Migraine  Dilution is 100 units/2 ml with a final concentration of 5 units per 0.1 ml.  A sterile 30-gauge, 0.5 inch needle as 0.1 ml (5 units) injection per each site. Injections were divided across 7 specific head/neck muscle areas as specified in the table below. All muscles were injected bilaterally with half the number of injection sites administered to the left, and half to the right side of the head and neck. 45 units wasted.      Head/Neck Area Dose (number of sites)   Frontalis bilaterally 20 units divided in 4 sites    bilaterally 10 units divided in 2 sites   Procerus 5 unit in 1 site   Occipitalis bilaterally 30 units divided in 6 sites   Temporalis bilaterally 40 units divided in 8 sites   Trapezius bilaterally 30 units divided in 6 sites   Cervical Paraspinal bilaterally 20 units divided in 2 sites   Total Dose 155 units divided in 31 sites         The procedure was completed successfully without complication during and after the injections, and the patient tolerated well throughout the procedure.        The recommended re-treatment schedule should be ~12 weeks from today.

## 2024-01-03 NOTE — PATIENT INSTRUCTIONS
Refill policies:    Allow 2-3 business days for refills; controlled substances may take longer.  Contact your pharmacy at least 5 days prior to running out of medication and have them send an electronic request or submit request through the “request refill” option in your [employer] account.  Refills are not addressed on weekends; covering physicians do not authorize routine medications on weekends.  No narcotics or controlled substances are refilled after noon on Fridays or by on call physicians.  By law, narcotics must be electronically prescribed.  A 30 day supply with no refills is the maximum allowed.  If your prescription is due for a refill, you may be due for a follow up appointment.  To best provide you care, patients receiving routine medications need to be seen at least once a year.  Patients receiving narcotic/controlled substance medications need to be seen at least once every 3 months.  In the event that your preferred pharmacy does not have the requested medication in stock (e.g. Backordered), it is your responsibility to find another pharmacy that has the requested medication available.  We will gladly send a new prescription to that pharmacy at your request.    Scheduling Tests:    If your physician has ordered radiology tests such as MRI or CT scans, please contact Central Scheduling at [phone] right away to schedule the test.  Once scheduled, the [facility] Centralized Referral Team will work with your insurance carrier to obtain pre-certification or prior authorization.  Depending on your insurance carrier, approval may take 3-10 days.  It is highly recommended patients assure they have received an authorization before having a test performed.  If test is done without insurance authorization, patient may be responsible for the entire amount billed.      Precertification and Prior Authorizations:  If your physician has recommended that you have a procedure or additional testing performed the [facility]  Centralized Referral Team will contact your insurance carrier to obtain pre-certification or prior authorization.    You are strongly encouraged to contact your insurance carrier to verify that your procedure/test has been approved and is a COVERED benefit.  Although the [facility] Centralized Referral Team does its due diligence, the insurance carrier gives the disclaimer that \"Although the procedure is authorized, this does not guarantee payment.\"    Ultimately the patient is responsible for payment.   Thank you for your understanding in this matter.  Paperwork Completion:  If you require FMLA or disability paperwork for your recovery, please make sure to either drop it off or have it faxed to our office at [phone]. Be sure the form has your name and date of birth on it.  The form will be faxed to our Forms Department and they will complete it for you.  There is a 25$ fee for all forms that need to be filled out.  Please be aware there is a 10-14 day turnaround time.  You will need to sign a release of information (MICHELLE) form if your paperwork does not come with one.  You may call the Forms Department with any questions at [phone].  Their fax number is [phone].

## 2024-01-03 NOTE — PROGRESS NOTES
Paperwork noting that patient may bear financial responsibility for procedure(s) performed in clinic today signed prior to proceeding with procedure(s).     Furthermore, patient notified that they should contact their insurer to verify that your procedure/test has been approved and is a COVERED benefit.  Although the [facility] staff does its due diligence, the insurance carrier gives the disclaimer that \"Although the procedure is authorized, this does not guarantee payment.\"    Ultimately the patient is responsible for payment.     Botox is:  [] Buy and Bill  [x] Patient Supplied        Botox Reauthorization Questions:  Has the patient experienced a reduction in frequency of migraines since starting Botox? yes  If yes, by what percentage? 60%  Has the intensity of migraines decreased since starting Botox? yes  If yes, by what percentage? 40%

## 2024-02-06 NOTE — TELEPHONE ENCOUNTER
Instructions sent to patient via [employer]. Iron found in foods comes in two forms: heme and non-heme iron.    Heme iron is commonly found in animal products and is more easily absorbed by the body. Sources of heme iron include:  Red meat (for example, beef, pork, lamb, goat, or venison)  Seafood (for example, fatty fish)  Poultry (for example, chicken or turkey)  Eggs    Non-heme iron can be found in plants and iron-fortified products. This type of iron is less easily absorbed by the body and will require careful planning to get enough iron for your baby. Sources of non-heme iron include:  Iron-fortified infant cereals  Tofu  Beans and lentils  Dark green leafy vegetables  Pairing non-heme iron sources with foods high in vitamin C can help your baby absorb the iron he or she needs to support development. Vitamin C-rich fruits and vegetables include:  Citrus fruits like oranges  Berries  Papaya  Tomatoes  Sweet potatoes  Broccoli  Cabbage  Dark green leafy vegetables     Well Child Visit at 4 Years   AMBULATORY CARE:   A well child visit  is when your child sees a healthcare provider to prevent health problems. Well child visits are used to track your child's growth and development. It is also a time for you to ask questions and to get information on how to keep your child safe. Write down your questions so you remember to ask them. Your child should have regular well child visits from birth to 17 years.  Development milestones your child may reach by 4 years:  Each child develops at his or her own pace. Your child might have already reached the following milestones, or he or she may reach them later:  Speak clearly and be understood easily    Know his or her first and last name and gender, and talk about his or her interests    Identify some colors and numbers, and draw a person who has at least 3 body parts    Tell a story or tell someone about an event, and use the past tense    Hop on one foot, and catch a bounced ball    Enjoy playing with other  children, and play board games    Dress and undress himself or herself, and want privacy for getting dressed    Control his or her bladder and bowels, with occasional accidents    Keep your child safe in the car:   Always place your child in a booster car seat.  Choose a seat that meets the Federal Motor Vehicle Safety Standard 213. Make sure the seat has a harness and clip. Also make sure that the harness and clips fit snugly against your child. There should be no more than a finger width of space between the strap and your child's chest. Ask your healthcare provider for more information on car safety seats.         Always put your child's car seat in the back seat.  Never put your child's car seat in the front. This will help prevent him or her from being injured in an accident.    Make your home safe for your child:   Place guards over windows on the second floor or higher.  This will prevent your child from falling out of the window. Keep furniture away from windows. Use cordless window shades, or get cords that do not have loops. You can also cut the loops. A child's head can fall through a looped cord, and the cord can become wrapped around his or her neck.    Secure heavy or large items.  This includes bookshelves, TVs, dressers, cabinets, and lamps. Make sure these items are held in place or nailed into the wall.    Keep all medicines, car supplies, lawn supplies, and cleaning supplies out of your child's reach.  Keep these items in a locked cabinet or closet. Call Poison Control (1-278.548.9507) if your child eats anything that could be harmful.         Store and lock all guns and weapons.  Make sure all guns are unloaded before you store them. Make sure your child cannot reach or find where weapons or bullets are kept. Never  leave a loaded gun unattended.    Keep your child safe in the sun and near water:   Always keep your child within reach near water.  This includes any time you are near ponds, lakes,  pools, the ocean, or the bathtub.    Ask about swimming lessons for your child.  At 4 years, your child may be ready for swimming lessons. He or she will need to be enrolled in lessons taught by a licensed instructor.    Put sunscreen on your child.  Ask your healthcare provider which sunscreen is safe for your child. Do not apply sunscreen to your child's eyes, mouth, or hands.    Other ways to keep your child safe:   Follow directions on the medicine label when you give your child medicine.  Ask your child's healthcare provider for directions if you do not know how to give the medicine. If your child misses a dose, do not double the next dose. Ask how to make up the missed dose.Do not give aspirin to children younger than 18 years.  Your child could develop Reye syndrome if he or she has the flu or a fever and takes aspirin. Reye syndrome can cause life-threatening brain and liver damage. Check your child's medicine labels for aspirin or salicylates.    Talk to your child about personal safety without making him or her anxious.  Teach him or her that no one has the right to touch his or her private parts. Also explain that others should not ask your child to touch their private parts. Let your child know that he or she should tell you even if he or she is told not to.    Do not let your child play outdoors without supervision from an adult.  Your child is not old enough to cross the street on his or her own. Do not let him or her play near the street. He or she could run or ride his or her bicycle into the street.    What you need to know about nutrition for your child:   Give your child a variety of healthy foods.  Healthy foods include fruits, vegetables, lean meats, and whole grains. Cut all foods into small pieces. Ask your healthcare provider how much of each type of food your child needs. The following are examples of healthy foods:    Whole grains such as bread, hot or cold cereal, and cooked pasta or  rice    Protein from lean meats, chicken, fish, beans, or eggs    Dairy such as whole milk, cheese, or yogurt    Vegetables such as carrots, broccoli, or spinach    Fruits such as strawberries, oranges, apples, or tomatoes       Make sure your child gets enough calcium.  Calcium is needed to build strong bones and teeth. Children need about 2 to 3 servings of dairy each day to get enough calcium. Good sources of calcium are low-fat dairy foods (milk, cheese, and yogurt). A serving of dairy is 8 ounces of milk or yogurt, or 1½ ounces of cheese. Other foods that contain calcium include tofu, kale, spinach, broccoli, almonds, and calcium-fortified orange juice. Ask your child's healthcare provider for more information about the serving sizes of these foods.         Limit foods high in fat and sugar.  These foods do not have the nutrients your child needs to be healthy. Food high in fat and sugar include snack foods (potato chips, candy, and other sweets), juice, fruit drinks, and soda. If your child eats these foods often, he or she may eat fewer healthy foods during meals. He or she may gain too much weight.    Do not give your child foods that could cause him or her to choke.  Examples include nuts, popcorn, and hard, raw vegetables. Cut round or hard foods into thin slices. Grapes and hotdogs are examples of round foods. Carrots are an example of hard foods.    Give your child 3 meals and 2 to 3 snacks per day.  Cut all food into small pieces. Examples of healthy snacks include applesauce, bananas, crackers, and cheese.    Have your child eat with other family members.  This gives your child the opportunity to watch and learn how others eat.         Let your child decide how much to eat.  Give your child small portions. Let your child have another serving if he or she asks for one. Your child will be very hungry on some days and want to eat more. For example, your child may want to eat more on days when he or she is  "more active. Your child may also eat more if he or she is going through a growth spurt. There may be days when he or she eats less than usual.       Keep your child's teeth healthy:   Your child needs to brush his or her teeth with fluoride toothpaste 2 times each day.  He or she also needs to floss 1 time each day. Have your child brush his or her teeth for at least 2 minutes. At 4 years, your child should be able to brush his or her teeth without help. Apply a small amount of toothpaste the size of a pea on the toothbrush. Make sure your child spits all of the toothpaste out. Your child does not need to rinse his or her mouth with water. The small amount of toothpaste that stays in his or her mouth can help prevent cavities.    Take your child to the dentist regularly.  A dentist can make sure your child's teeth and gums are developing properly. Your child may be given a fluoride treatment to prevent cavities. Ask your child's dentist how often he or she needs to visit.    Create routines for your child:   Have your child take at least 1 nap each day.  Plan the nap early enough in the day so your child is still tired at bedtime.    Create a bedtime routine.  This may include 1 hour of calm and quiet activities before bed. You can read to your child or listen to music. Have your child brush his or her teeth during his or her bedtime routine.    Plan for family time.  Start family traditions such as going for a walk, listening to music, or playing games. Do not watch TV during family time. Have your child play with other family members during family time.    Other ways to support your child:   Do not punish your child with hitting, spanking, or yelling.  Never shake your child. Tell your child \"no.\" Give your child short and simple rules. Do not allow your child to hit, kick, or bite another person. Put your child in time-out in a safe place. You can distract your child with a new activity when he or she behaves " badly. Make sure everyone who cares for your child disciplines him or her the same way.    Read to your child.  This will comfort your child and help his or her brain develop. Point to pictures as you read. This will help your child make connections between pictures and words. Have other family members or caregivers read to your child. At 4 years, your child may be able to read parts of some books to you. He or she may also enjoy reading quietly on his or her own.         Help your child get ready to go to school.  Your child's healthcare provider may help you create meal, play, and bedtime schedules. Your child will need to be able to follow a schedule before he or she can start school. You may also need to make sure your child can go to the bathroom on his or her own and wash his or her own hands.    Talk with your child.  Have him or her tell you about his or her day. Ask him or her what he or she did during the day, or if he or she played with a friend. Ask what he or she enjoyed most about the day. Have him or her tell you something he or she learned.    Help your child learn outside of school.  Take him or her to places that will help him or her learn and discover. For example, a children's museum will allow him or her to touch and play with objects as he or she learns. Your child may be ready to have his or her own library card. Let him or her choose his or her own books to check out from the library. Teach him or her to take care of the books and to return them when he or she is done.    Talk to your child's healthcare provider about bedwetting.  Bedwetting may happen up to the age of 4 years in girls and 5 years in boys. Talk to your child's healthcare provider if you have any concerns about this.    Engage with your child if he or she watches TV.  Do not let your child watch TV alone, if possible. You or another adult should watch with your child. Talk with your child about what he or she is watching. When  TV time is done, try to apply what you and your child saw. For example, if your child saw someone talking about colors, have your child find objects that are those colors. TV time should never replace active playtime. Turn the TV off when your child plays. Do not let your child watch TV during meals or within 1 hour of bedtime.    Limit your child's screen time.  Screen time is the amount of television, computer, smart phone, and video game time your child has each day. It is important to limit screen time. This helps your child get enough sleep, physical activity, and social interaction each day. Your child's pediatrician can help you create a screen time plan. The daily limit is usually 1 hour for children 2 to 5 years. The daily limit is usually 2 hours for children 6 years or older. You can also set limits on the kinds of devices your child can use, and where he or she can use them. Keep the plan where your child and anyone who takes care of him or her can see it. Create a plan for each child in your family. You can also go to https://www.healthychildren.org/English/media/Pages/default.aspx#planview for more help creating a plan.    Get a bicycle helmet for your child.  Make sure your child always wears a helmet, even when he or she goes on short bicycle rides. He or she should also wear a helmet if he or she rides in a passenger seat on an adult bicycle. Make sure the helmet fits correctly. Do not buy a larger helmet for your child to grow into. Get one that fits him or her now. Ask your child's healthcare provider for more information on bicycle helmets.       What you need to know about your child's next well child visit:  Your child's healthcare provider will tell you when to bring him or her in again. The next well child visit is usually at 5 to 6 years. Contact your child's healthcare provider if you have questions or concerns about your child's health or care before the next visit. All children aged 3 to  5 years should have at least one vision screening. Your child may need vaccines at the next well child visit. Your provider will tell you which vaccines your child needs and when your child should get them.       © Copyright [employer] 2023 Information is for End User's use only and may not be sold, redistributed or otherwise used for commercial purposes.  The above information is an  only. It is not intended as medical advice for individual conditions or treatments. Talk to your doctor, nurse or pharmacist before following any medical regimen to see if it is safe and effective for you.    Allergy medication, Zyrtec, for symptom and congestion relief.

## 2024-02-11 ENCOUNTER — PATIENT MESSAGE (OUTPATIENT)
Dept: RHEUMATOLOGY | Facility: CLINIC | Age: 44
End: 2024-02-11

## 2024-02-11 DIAGNOSIS — L08.9 SKIN INFECTION: Primary | ICD-10-CM

## 2024-02-12 DIAGNOSIS — M35.00 SJOGREN SYNDROME, UNSPECIFIED (HCC): ICD-10-CM

## 2024-02-12 DIAGNOSIS — M35.9 UNDIFFERENTIATED CONNECTIVE TISSUE DISEASE (HCC): ICD-10-CM

## 2024-02-12 RX ORDER — HYDROXYCHLOROQUINE SULFATE 200 MG/1
200 TABLET, FILM COATED ORAL 2 TIMES DAILY
Qty: 180 TABLET | Refills: 1 | Status: SHIPPED | OUTPATIENT
Start: 2024-02-12

## 2024-02-12 NOTE — TELEPHONE ENCOUNTER
Future Appointments   Date Time Provider Department Center   4/3/2024 10:20 AM Emilie Duque MD [facility]     Last office visit: 10/19/2023    Last fill: 9/1/2023 180 tab, 0 refills

## 2024-02-13 RX ORDER — MUPIROCIN CALCIUM 20 MG/G
CREAM TOPICAL
Qty: 15 G | Refills: 1 | Status: SHIPPED | OUTPATIENT
Start: 2024-02-13 | End: 2024-02-13

## 2024-02-13 NOTE — TELEPHONE ENCOUNTER
From: Adelina Desir  To: Philip Johsnon  Sent: 2/11/2024 6:49 PM CST  Subject: Nose    Hi Dr Marin!  I hope you are well. Periodically I have an issue with a little dryness at the opening of my nose (in addition to the dryness of the sinuses and nasal passage) that a little coconut oil helps, but this time it just keeps getting worse and is a bit painful. Any suggestions? I have attached a picture.

## 2024-02-14 ENCOUNTER — PATIENT MESSAGE (OUTPATIENT)
Dept: FAMILY MEDICINE CLINIC | Facility: CLINIC | Age: 44
End: 2024-02-14

## 2024-02-14 DIAGNOSIS — B37.9 YEAST INFECTION: ICD-10-CM

## 2024-02-14 DIAGNOSIS — M35.9 UNDIFFERENTIATED CONNECTIVE TISSUE DISEASE (HCC): ICD-10-CM

## 2024-02-14 DIAGNOSIS — E04.2 MULTINODULAR THYROID: Primary | ICD-10-CM

## 2024-02-14 DIAGNOSIS — K11.7 XEROSTOMIA: ICD-10-CM

## 2024-02-14 DIAGNOSIS — M35.00 SJOGREN SYNDROME, UNSPECIFIED (HCC): ICD-10-CM

## 2024-02-14 RX ORDER — FLUCONAZOLE 150 MG/1
150 TABLET ORAL DAILY PRN
Qty: 2 TABLET | Refills: 0 | Status: SHIPPED | OUTPATIENT
Start: 2024-02-14

## 2024-02-14 NOTE — TELEPHONE ENCOUNTER
Order placed for the thyroid ultrasound can you please fax it to [facility] where she would like it done and send patient a message that it was faxed over.

## 2024-02-14 NOTE — TELEPHONE ENCOUNTER
LOV: 10/19/2023    RTC: 4-5 months  Future Appointments   Date Time Provider Department Center   4/3/2024 10:20 AM Emilie Duque MD [facility]   6/4/2024 10:00 AM Philip Johnson MD [facility]   LABS: not completed at this time.     LAST REFILL: 12/14/2023, Quantity 2, Refills 0    Dr Johnson-- orders pending, approve if agreeable.

## 2024-02-14 NOTE — TELEPHONE ENCOUNTER
This pt is looking to have an ultrasound of her thyroid. Last done 9/28/21 which showed bilateral thyroid nodules. She states everything has \"calmed down\". Does have hx of goiter. Please advise, thanks.

## 2024-02-14 NOTE — TELEPHONE ENCOUNTER
From: Adelina Desir  To: Lucila Aponte  Sent: 2/14/2024 12:39 PM CST  Subject: Thyroid Ultrasound    Gareth Gaytan,  I tried to schedule my thyroid ultrasound, but [facility] said they never received the order. I requested it back in Nov and Margie said she sent it to them. Things are finally calming down and I would like to get my health appts/tests done now. :)

## 2024-03-04 DIAGNOSIS — F41.1 GAD (GENERALIZED ANXIETY DISORDER): ICD-10-CM

## 2024-03-04 RX ORDER — ALPRAZOLAM 0.5 MG/1
TABLET ORAL
Qty: 10 TABLET | Refills: 0 | Status: SHIPPED | OUTPATIENT
Start: 2024-03-04

## 2024-03-04 NOTE — TELEPHONE ENCOUNTER
Requested Prescriptions     Pending Prescriptions Disp Refills    ALPRAZolam 0.5 MG Oral Tab 10 tablet 0     Sig: TAKE one half to one TABLET BY MOUTH TWICE DAILY AS NEEDED for anxiety     Last refill: 6/29/23 10 tabs 0 refills    Last Appointment: 12/29/23 Telemedicine    Next Appointment: 3/12/24

## 2024-03-07 ENCOUNTER — OFFICE VISIT (OUTPATIENT)
Facility: CLINIC | Age: 44
End: 2024-03-07
Payer: COMMERCIAL

## 2024-03-07 VITALS
DIASTOLIC BLOOD PRESSURE: 58 MMHG | BODY MASS INDEX: 32.76 KG/M2 | WEIGHT: 178 LBS | HEIGHT: 62 IN | SYSTOLIC BLOOD PRESSURE: 86 MMHG

## 2024-03-07 DIAGNOSIS — N39.46 MIXED STRESS AND URGE URINARY INCONTINENCE: ICD-10-CM

## 2024-03-07 DIAGNOSIS — N89.8 VAGINAL DRYNESS: ICD-10-CM

## 2024-03-07 DIAGNOSIS — N94.89 VULVAR BURNING: Primary | ICD-10-CM

## 2024-03-07 DIAGNOSIS — M35.00 SJOGREN'S SYNDROME, WITH UNSPECIFIED ORGAN INVOLVEMENT (HCC): ICD-10-CM

## 2024-03-07 PROCEDURE — 99214 OFFICE O/P EST MOD 30 MIN: CPT | Performed by: OBSTETRICS & GYNECOLOGY
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
## 2024-04-01 ENCOUNTER — OFFICE VISIT (OUTPATIENT)
Dept: FAMILY MEDICINE CLINIC | Facility: CLINIC | Age: 44
End: 2024-04-01
Payer: COMMERCIAL

## 2024-04-01 VITALS
SYSTOLIC BLOOD PRESSURE: 118 MMHG | RESPIRATION RATE: 16 BRPM | TEMPERATURE: 98 F | BODY MASS INDEX: 34.04 KG/M2 | WEIGHT: 185 LBS | DIASTOLIC BLOOD PRESSURE: 68 MMHG | HEIGHT: 62 IN | OXYGEN SATURATION: 98 % | HEART RATE: 84 BPM

## 2024-04-01 DIAGNOSIS — N89.8 VAGINAL DISCHARGE: ICD-10-CM

## 2024-04-01 DIAGNOSIS — M35.00 SJOGREN'S SYNDROME WITHOUT EXTRAGLANDULAR INVOLVEMENT (HCC): ICD-10-CM

## 2024-04-01 DIAGNOSIS — Z13.29 SCREENING FOR ENDOCRINE, NUTRITIONAL, METABOLIC AND IMMUNITY DISORDER: ICD-10-CM

## 2024-04-01 DIAGNOSIS — Z01.419 WELL FEMALE EXAM WITH ROUTINE GYNECOLOGICAL EXAM: Primary | ICD-10-CM

## 2024-04-01 DIAGNOSIS — Z79.899 MEDICATION MANAGEMENT: ICD-10-CM

## 2024-04-01 DIAGNOSIS — Z13.21 SCREENING FOR ENDOCRINE, NUTRITIONAL, METABOLIC AND IMMUNITY DISORDER: ICD-10-CM

## 2024-04-01 DIAGNOSIS — M35.9 UNDIFFERENTIATED CONNECTIVE TISSUE DISEASE (HCC): ICD-10-CM

## 2024-04-01 DIAGNOSIS — F41.1 GAD (GENERALIZED ANXIETY DISORDER): ICD-10-CM

## 2024-04-01 DIAGNOSIS — F98.8 ATTENTION DEFICIT DISORDER (ADD) WITHOUT HYPERACTIVITY: ICD-10-CM

## 2024-04-01 DIAGNOSIS — Z13.228 SCREENING FOR ENDOCRINE, NUTRITIONAL, METABOLIC AND IMMUNITY DISORDER: ICD-10-CM

## 2024-04-01 DIAGNOSIS — Z86.39 HISTORY OF VITAMIN D DEFICIENCY: ICD-10-CM

## 2024-04-01 DIAGNOSIS — Z13.6 ENCOUNTER FOR LIPID SCREENING FOR CARDIOVASCULAR DISEASE: ICD-10-CM

## 2024-04-01 DIAGNOSIS — Z13.0 SCREENING FOR ENDOCRINE, NUTRITIONAL, METABOLIC AND IMMUNITY DISORDER: ICD-10-CM

## 2024-04-01 DIAGNOSIS — Z12.31 VISIT FOR SCREENING MAMMOGRAM: ICD-10-CM

## 2024-04-01 DIAGNOSIS — Z13.220 ENCOUNTER FOR LIPID SCREENING FOR CARDIOVASCULAR DISEASE: ICD-10-CM

## 2024-04-01 PROCEDURE — 81514 NFCT DS BV&VAGINITIS DNA ALG: CPT | Performed by: FAMILY MEDICINE

## 2024-04-01 PROCEDURE — 99396 PREV VISIT EST AGE 40-64: CPT | Performed by: FAMILY MEDICINE

## 2024-04-01 PROCEDURE — 99213 OFFICE O/P EST LOW 20 MIN: CPT | Performed by: FAMILY MEDICINE

## 2024-04-01 RX ORDER — ALPRAZOLAM 0.5 MG/1
TABLET ORAL
Qty: 20 TABLET | Refills: 0 | Status: SHIPPED | OUTPATIENT
Start: 2024-04-01

## 2024-04-01 RX ORDER — LISDEXAMFETAMINE DIMESYLATE 50 MG
CAPSULE ORAL
COMMUNITY
Start: 2024-03-28

## 2024-04-01 RX ORDER — PERFLUOROHEXYLOCTANE 1 MG/MG
SOLUTION OPHTHALMIC
COMMUNITY
Start: 2024-02-19
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
## 2024-04-02 ENCOUNTER — PATIENT MESSAGE (OUTPATIENT)
Dept: FAMILY MEDICINE CLINIC | Facility: CLINIC | Age: 44
End: 2024-04-02

## 2024-04-03 ENCOUNTER — OFFICE VISIT (OUTPATIENT)
Dept: NEUROLOGY | Facility: CLINIC | Age: 44
End: 2024-04-03
Payer: COMMERCIAL

## 2024-04-03 VITALS
RESPIRATION RATE: 16 BRPM | WEIGHT: 185 LBS | SYSTOLIC BLOOD PRESSURE: 110 MMHG | HEART RATE: 101 BPM | BODY MASS INDEX: 34 KG/M2 | DIASTOLIC BLOOD PRESSURE: 68 MMHG

## 2024-04-03 DIAGNOSIS — G43.019 INTRACTABLE MIGRAINE WITHOUT AURA AND WITHOUT STATUS MIGRAINOSUS: ICD-10-CM

## 2024-04-03 DIAGNOSIS — G43.709 CHRONIC MIGRAINE W/O AURA W/O STATUS MIGRAINOSUS, NOT INTRACTABLE: Primary | ICD-10-CM

## 2024-04-03 LAB
BV BACTERIA DNA VAG QL NAA+PROBE: NEGATIVE
C GLABRATA DNA VAG QL NAA+PROBE: NEGATIVE
C KRUSEI DNA VAG QL NAA+PROBE: NEGATIVE
CANDIDA DNA VAG QL NAA+PROBE: POSITIVE
T VAGINALIS DNA VAG QL NAA+PROBE: NEGATIVE

## 2024-04-03 PROCEDURE — 99213 OFFICE O/P EST LOW 20 MIN: CPT | Performed by: OTHER

## 2024-04-03 PROCEDURE — 64615 CHEMODENERV MUSC MIGRAINE: CPT | Performed by: OTHER

## 2024-04-03 RX ORDER — RIZATRIPTAN BENZOATE 10 MG/1
TABLET ORAL
Qty: 12 TABLET | Refills: 5 | Status: SHIPPED | OUTPATIENT
Start: 2024-04-03

## 2024-04-03 RX ORDER — FLUCONAZOLE 150 MG/1
150 TABLET ORAL ONCE
Qty: 2 TABLET | Refills: 0 | Status: SHIPPED | OUTPATIENT
Start: 2024-04-03 | End: 2024-04-03

## 2024-04-03 RX ORDER — AMITRIPTYLINE HYDROCHLORIDE 25 MG/1
25 TABLET, FILM COATED ORAL NIGHTLY
Qty: 30 TABLET | Refills: 5 | Status: SHIPPED | OUTPATIENT
Start: 2024-04-03

## 2024-04-03 NOTE — PATIENT INSTRUCTIONS
Refill policies:    Allow 2-3 business days for refills; controlled substances may take longer.  Contact your pharmacy at least 5 days prior to running out of medication and have them send an electronic request or submit request through the “request refill” option in your [employer] account.  Refills are not addressed on weekends; covering physicians do not authorize routine medications on weekends.  No narcotics or controlled substances are refilled after noon on Fridays or by on call physicians.  By law, narcotics must be electronically prescribed.  A 30 day supply with no refills is the maximum allowed.  If your prescription is due for a refill, you may be due for a follow up appointment.  To best provide you care, patients receiving routine medications need to be seen at least once a year.  Patients receiving narcotic/controlled substance medications need to be seen at least once every 3 months.  In the event that your preferred pharmacy does not have the requested medication in stock (e.g. Backordered), it is your responsibility to find another pharmacy that has the requested medication available.  We will gladly send a new prescription to that pharmacy at your request.    Scheduling Tests:    If your physician has ordered radiology tests such as MRI or CT scans, please contact Central Scheduling at [phone] right away to schedule the test.  Once scheduled, the [facility] Centralized Referral Team will work with your insurance carrier to obtain pre-certification or prior authorization.  Depending on your insurance carrier, approval may take 3-10 days.  It is highly recommended patients assure they have received an authorization before having a test performed.  If test is done without insurance authorization, patient may be responsible for the entire amount billed.      Precertification and Prior Authorizations:  If your physician has recommended that you have a procedure or additional testing performed the [facility]  Centralized Referral Team will contact your insurance carrier to obtain pre-certification or prior authorization.    You are strongly encouraged to contact your insurance carrier to verify that your procedure/test has been approved and is a COVERED benefit.  Although the [facility] Centralized Referral Team does its due diligence, the insurance carrier gives the disclaimer that \"Although the procedure is authorized, this does not guarantee payment.\"    Ultimately the patient is responsible for payment.   Thank you for your understanding in this matter.  Paperwork Completion:  If you require FMLA or disability paperwork for your recovery, please make sure to either drop it off or have it faxed to our office at [phone]. Be sure the form has your name and date of birth on it.  The form will be faxed to our Forms Department and they will complete it for you.  There is a 25$ fee for all forms that need to be filled out.  Please be aware there is a 10-14 day turnaround time.  You will need to sign a release of information (MICHELLE) form if your paperwork does not come with one.  You may call the Forms Department with any questions at [phone].  Their fax number is [phone].
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
## 2024-04-03 NOTE — PROGRESS NOTES
Paperwork noting that patient may bear financial responsibility for procedure(s) performed in clinic today signed prior to proceeding with procedure(s).    Furthermore, patient notified that they should contact their insurer to verify that your procedure/test has been approved and is a COVERED benefit.  Although the [facility] staff does its due diligence, the insurance carrier gives the disclaimer that \"Although the procedure is authorized, this does not guarantee payment.\"    Ultimately the patient is responsible for payment.    Botox is:  [x] Buy and Bill  [] Patient Supplied      Botox Reauthorization Questions:  Has the patient experienced a reduction in frequency of migraines since starting Botox? yes  If yes, by what percentage? 50%  Has the intensity of migraines decreased since starting Botox? yes  If yes, by what percentage? 50%

## 2024-04-03 NOTE — TELEPHONE ENCOUNTER
From: Adelina Desir  To: Lucila Aponte  Sent: 4/2/2024 9:47 PM CDT  Subject: Swab results    Gareth Gaytan,  I haven’t seen the swab results come through yet. Have you seen them come through on your end?

## 2024-04-04 NOTE — PROGRESS NOTES
Diflucan 150 mg take 1 x 1 day and repeat 4 days later if symptoms persist prescription sent to pharmacy

## 2024-04-17 ENCOUNTER — MED REC SCAN ONLY (OUTPATIENT)
Dept: FAMILY MEDICINE CLINIC | Facility: CLINIC | Age: 44
End: 2024-04-17

## 2024-04-17 ENCOUNTER — TELEPHONE (OUTPATIENT)
Dept: FAMILY MEDICINE CLINIC | Facility: CLINIC | Age: 44
End: 2024-04-17

## 2024-04-17 NOTE — TELEPHONE ENCOUNTER
Received fax on 4/17/2024 requesting medical records. Requesting medical records for any and all medical records from 12/29/2015 to present only. Intake forms patient completed forms and or documents correspondence all office records emergency records reports inpatient and outpatient charts and records lien files SOAP notes pathology records and reports lab reports pharmacy and prescription records physical therapy records sin in sheet all descriptions of exercises prescribed documentation which indicate date and time of patient appointments insurance documents all radiology reports and readings and any other documents maintained pertaining to the care treatment and examination of the patient.     Name: [facility]  Address:[address] [place] [address]  PH: [phone]  Fax: [phone]    Original sent to Scan STAT, copy sent to medical records. Red Tag # [id number]

## 2024-04-18 ENCOUNTER — PATIENT MESSAGE (OUTPATIENT)
Dept: FAMILY MEDICINE CLINIC | Facility: CLINIC | Age: 44
End: 2024-04-18

## 2024-04-18 NOTE — TELEPHONE ENCOUNTER
From: Adelina Desir  To: Lucila Aponte  Sent: 4/18/2024 12:53 PM CDT  Subject: Referral    Hi Lucila,    A very dear friend of ours is looking for a PCP and we told her we HIGHLY recommend you. She tried to make an appt but was told you are no longer accepting new patients. Any chance you can make an exception? You will LOVE HER and vice versa!     Here name is Shahanaanibal Ochoa. Please let me know.    Thanks,  Adelina

## 2024-04-22 ENCOUNTER — PATIENT MESSAGE (OUTPATIENT)
Dept: FAMILY MEDICINE CLINIC | Facility: CLINIC | Age: 44
End: 2024-04-22

## 2024-04-22 NOTE — TELEPHONE ENCOUNTER
From: Adelina Desir  To: Lucila Aponte  Sent: 4/22/2024 1:19 PM CDT  Subject: Fluconazole    Good Afternoon, Lucila!    I was doing better after that treatment of fluconazole (both single-pill doses)but started with symptoms again yesterday. Do I need a stronger dose maybe? Or something different?

## 2024-04-22 NOTE — TELEPHONE ENCOUNTER
Please see below message from patient and advise. Swab was positive for yeast 4/1 and treated w/ Diflucan.

## 2024-05-17 ENCOUNTER — PATIENT MESSAGE (OUTPATIENT)
Dept: FAMILY MEDICINE CLINIC | Facility: CLINIC | Age: 44
End: 2024-05-17

## 2024-05-17 NOTE — TELEPHONE ENCOUNTER
From: Adelina Desir  To: Lucila Aponte  Sent: 5/17/2024 8:35 AM CDT  Subject: Vaginal irritation    Hi Lucila,  The cream worked great…but I started again yesterday with irritation and discharge. Uggghhhhh. Can I try one more round of the cream? I hate using it, but I hate this irritation more.

## 2024-05-17 NOTE — TELEPHONE ENCOUNTER
Handoff to receiving nurse was performed and questions were answered. Vital signs noted. Patient stable.  Comments:   Lucila Aponte PA-C  [facility] Clinical Staff19 minutes ago (9:41 AM)       Please send refill on vaginal cream for the yeast infection. If not better needs to be seen.

## 2024-05-17 NOTE — TELEPHONE ENCOUNTER
Please see below message from patient and advise. She was prescribed terconazole vaginal cream 4/22.

## 2024-05-21 ENCOUNTER — TELEPHONE (OUTPATIENT)
Dept: NEUROLOGY | Facility: CLINIC | Age: 44
End: 2024-05-21

## 2024-05-21 NOTE — TELEPHONE ENCOUNTER
[phone] spoke with All CEVALLOS      Botox will be delivered on 5/29/24.  Patient has given consent.  Confirmed office address and hours.

## 2024-05-28 NOTE — TELEPHONE ENCOUNTER
left voicemail regarding appt that needs to be rescheduled (originally 06/28) provider will be out of office.   Please offer any date after 07/02 as this appt is Botox.

## 2024-05-29 NOTE — TELEPHONE ENCOUNTER
Botox received in:  [facility], [address] [address]  [address]    Lot No: [id number]  Exp Date: 10/2026  # Units: 200     Pt has Appt scheduled on 06/28 with Dr. Duque.   Botox given to JUNIOR Mcmillan to be placed in fridge.  Routed to PA team for delivery notification.

## 2024-06-03 ENCOUNTER — LAB ENCOUNTER (OUTPATIENT)
Dept: LAB | Age: 44
End: 2024-06-03
Attending: INTERNAL MEDICINE
Payer: COMMERCIAL

## 2024-06-03 DIAGNOSIS — K11.1 HYPERTROPHY OF SALIVARY GLAND: Primary | ICD-10-CM

## 2024-06-03 DIAGNOSIS — Z13.21 SCREENING FOR ENDOCRINE, NUTRITIONAL, METABOLIC AND IMMUNITY DISORDER: ICD-10-CM

## 2024-06-03 DIAGNOSIS — Z13.228 SCREENING FOR ENDOCRINE, NUTRITIONAL, METABOLIC AND IMMUNITY DISORDER: ICD-10-CM

## 2024-06-03 DIAGNOSIS — Z51.81 ENCOUNTER FOR THERAPEUTIC DRUG MONITORING: ICD-10-CM

## 2024-06-03 DIAGNOSIS — Z13.220 ENCOUNTER FOR LIPID SCREENING FOR CARDIOVASCULAR DISEASE: ICD-10-CM

## 2024-06-03 DIAGNOSIS — M35.9 UNDIFFERENTIATED CONNECTIVE TISSUE DISEASE (HCC): ICD-10-CM

## 2024-06-03 DIAGNOSIS — Z86.39 HISTORY OF VITAMIN D DEFICIENCY: ICD-10-CM

## 2024-06-03 DIAGNOSIS — Z13.0 SCREENING FOR ENDOCRINE, NUTRITIONAL, METABOLIC AND IMMUNITY DISORDER: ICD-10-CM

## 2024-06-03 DIAGNOSIS — Z13.29 SCREENING FOR ENDOCRINE, NUTRITIONAL, METABOLIC AND IMMUNITY DISORDER: ICD-10-CM

## 2024-06-03 DIAGNOSIS — Z13.6 ENCOUNTER FOR LIPID SCREENING FOR CARDIOVASCULAR DISEASE: ICD-10-CM

## 2024-06-03 DIAGNOSIS — M35.9 DIFFUSE DISEASE OF CONNECTIVE TISSUE (HCC): ICD-10-CM

## 2024-06-03 LAB
ALBUMIN SERPL-MCNC: 3.5 G/DL (ref 3.4–5)
ALBUMIN/GLOB SERPL: 0.9 {RATIO} (ref 1–2)
ALP LIVER SERPL-CCNC: 84 U/L
ALT SERPL-CCNC: 29 U/L
ANION GAP SERPL CALC-SCNC: 7 MMOL/L (ref 0–18)
AST SERPL-CCNC: 29 U/L (ref 15–37)
BASOPHILS # BLD AUTO: 0.05 X10(3) UL (ref 0–0.2)
BASOPHILS NFR BLD AUTO: 0.8 %
BILIRUB SERPL-MCNC: 0.3 MG/DL (ref 0.1–2)
BUN BLD-MCNC: 17 MG/DL (ref 9–23)
CALCIUM BLD-MCNC: 9 MG/DL (ref 8.5–10.1)
CHLORIDE SERPL-SCNC: 106 MMOL/L (ref 98–112)
CHOLEST SERPL-MCNC: 190 MG/DL (ref ?–200)
CO2 SERPL-SCNC: 25 MMOL/L (ref 21–32)
CREAT BLD-MCNC: 0.75 MG/DL
CREAT UR-SCNC: 132 MG/DL
CRP SERPL-MCNC: 0.49 MG/DL (ref ?–0.3)
DEPRECATED HBV CORE AB SER IA-ACNC: 33.5 NG/ML
EGFRCR SERPLBLD CKD-EPI 2021: 101 ML/MIN/1.73M2 (ref 60–?)
EOSINOPHIL # BLD AUTO: 0.3 X10(3) UL (ref 0–0.7)
EOSINOPHIL NFR BLD AUTO: 4.9 %
ERYTHROCYTE [DISTWIDTH] IN BLOOD BY AUTOMATED COUNT: 12.1 %
FASTING PATIENT LIPID ANSWER: NO
FASTING STATUS PATIENT QL REPORTED: NO
GLOBULIN PLAS-MCNC: 3.8 G/DL (ref 2.8–4.4)
GLUCOSE BLD-MCNC: 75 MG/DL (ref 70–99)
HCT VFR BLD AUTO: 43.5 %
HDLC SERPL-MCNC: 56 MG/DL (ref 40–59)
HGB BLD-MCNC: 14.1 G/DL
IMM GRANULOCYTES # BLD AUTO: 0.02 X10(3) UL (ref 0–1)
IMM GRANULOCYTES NFR BLD: 0.3 %
LDLC SERPL CALC-MCNC: 119 MG/DL (ref ?–100)
LYMPHOCYTES # BLD AUTO: 1.61 X10(3) UL (ref 1–4)
LYMPHOCYTES NFR BLD AUTO: 26.1 %
MCH RBC QN AUTO: 32 PG (ref 26–34)
MCHC RBC AUTO-ENTMCNC: 32.4 G/DL (ref 31–37)
MCV RBC AUTO: 98.9 FL
MONOCYTES # BLD AUTO: 0.44 X10(3) UL (ref 0.1–1)
MONOCYTES NFR BLD AUTO: 7.1 %
NEUTROPHILS # BLD AUTO: 3.75 X10 (3) UL (ref 1.5–7.7)
NEUTROPHILS # BLD AUTO: 3.75 X10(3) UL (ref 1.5–7.7)
NEUTROPHILS NFR BLD AUTO: 60.8 %
NONHDLC SERPL-MCNC: 134 MG/DL (ref ?–130)
OSMOLALITY SERPL CALC.SUM OF ELEC: 286 MOSM/KG (ref 275–295)
PLATELET # BLD AUTO: 366 10(3)UL (ref 150–450)
POTASSIUM SERPL-SCNC: 4 MMOL/L (ref 3.5–5.1)
PROT SERPL-MCNC: 7.3 G/DL (ref 6.4–8.2)
PROT UR-MCNC: 23.2 MG/DL
RBC # BLD AUTO: 4.4 X10(6)UL
SODIUM SERPL-SCNC: 138 MMOL/L (ref 136–145)
T4 FREE SERPL-MCNC: 0.8 NG/DL (ref 0.8–1.7)
TRIGL SERPL-MCNC: 84 MG/DL (ref 30–149)
TSI SER-ACNC: 0.38 MIU/ML (ref 0.36–3.74)
VIT B12 SERPL-MCNC: 520 PG/ML (ref 193–986)
VIT D+METAB SERPL-MCNC: 29.5 NG/ML (ref 30–100)
VLDLC SERPL CALC-MCNC: 15 MG/DL (ref 0–30)
WBC # BLD AUTO: 6.2 X10(3) UL (ref 4–11)

## 2024-06-03 PROCEDURE — 84165 PROTEIN E-PHORESIS SERUM: CPT

## 2024-06-03 PROCEDURE — 86334 IMMUNOFIX E-PHORESIS SERUM: CPT

## 2024-06-03 PROCEDURE — 83521 IG LIGHT CHAINS FREE EACH: CPT

## 2024-06-03 PROCEDURE — 84443 ASSAY THYROID STIM HORMONE: CPT

## 2024-06-03 PROCEDURE — 86225 DNA ANTIBODY NATIVE: CPT

## 2024-06-03 PROCEDURE — 84439 ASSAY OF FREE THYROXINE: CPT

## 2024-06-03 PROCEDURE — 80061 LIPID PANEL: CPT

## 2024-06-03 PROCEDURE — 82728 ASSAY OF FERRITIN: CPT

## 2024-06-03 PROCEDURE — 82784 ASSAY IGA/IGD/IGG/IGM EACH: CPT

## 2024-06-03 PROCEDURE — 82306 VITAMIN D 25 HYDROXY: CPT

## 2024-06-03 PROCEDURE — 82607 VITAMIN B-12: CPT

## 2024-06-04 ENCOUNTER — OFFICE VISIT (OUTPATIENT)
Dept: RHEUMATOLOGY | Facility: CLINIC | Age: 44
End: 2024-06-04
Payer: COMMERCIAL

## 2024-06-04 ENCOUNTER — TELEPHONE (OUTPATIENT)
Dept: NEUROLOGY | Facility: CLINIC | Age: 44
End: 2024-06-04

## 2024-06-04 VITALS
BODY MASS INDEX: 34.01 KG/M2 | WEIGHT: 184.81 LBS | OXYGEN SATURATION: 97 % | TEMPERATURE: 98 F | SYSTOLIC BLOOD PRESSURE: 102 MMHG | RESPIRATION RATE: 16 BRPM | HEART RATE: 104 BPM | HEIGHT: 62 IN | DIASTOLIC BLOOD PRESSURE: 68 MMHG

## 2024-06-04 DIAGNOSIS — M35.9 UNDIFFERENTIATED CONNECTIVE TISSUE DISEASE (HCC): Primary | ICD-10-CM

## 2024-06-04 DIAGNOSIS — K11.7 XEROSTOMIA: ICD-10-CM

## 2024-06-04 DIAGNOSIS — M35.00 SJOGREN SYNDROME, UNSPECIFIED (HCC): ICD-10-CM

## 2024-06-04 DIAGNOSIS — Z51.81 THERAPEUTIC DRUG MONITORING: ICD-10-CM

## 2024-06-04 LAB
ALBUMIN SERPL ELPH-MCNC: 3.87 G/DL (ref 3.75–5.21)
ALBUMIN/GLOB SERPL: 1.37 {RATIO} (ref 1–2)
ALPHA1 GLOB SERPL ELPH-MCNC: 0.29 G/DL (ref 0.19–0.46)
ALPHA2 GLOB SERPL ELPH-MCNC: 0.57 G/DL (ref 0.48–1.05)
B-GLOBULIN SERPL ELPH-MCNC: 0.77 G/DL (ref 0.68–1.23)
C3 SERPL-MCNC: 129 MG/DL (ref 90–180)
C4 SERPL-MCNC: 33.4 MG/DL (ref 10–40)
GAMMA GLOB SERPL ELPH-MCNC: 1.2 G/DL (ref 0.62–1.7)
IGA SERPL-MCNC: 281 MG/DL (ref 70–312)
IGM SERPL-MCNC: 149 MG/DL (ref 43–279)
IMMUNOGLOBULIN PNL SER-MCNC: 1220 MG/DL (ref 791–1643)
KAPPA LC FREE SER-MCNC: 2.18 MG/DL (ref 0.33–1.94)
KAPPA LC FREE/LAMBDA FREE SER NEPH: 0.95 {RATIO} (ref 0.26–1.65)
LAMBDA LC FREE SERPL-MCNC: 2.3 MG/DL (ref 0.57–2.63)
PROT SERPL-MCNC: 6.7 G/DL (ref 5.7–8.2)

## 2024-06-04 PROCEDURE — 99214 OFFICE O/P EST MOD 30 MIN: CPT | Performed by: INTERNAL MEDICINE

## 2024-06-04 RX ORDER — FOLIC ACID 1 MG/1
3 TABLET ORAL DAILY
Qty: 270 TABLET | Refills: 3 | Status: SHIPPED | OUTPATIENT
Start: 2024-06-04

## 2024-06-04 RX ORDER — METHOTREXATE 2.5 MG/1
15 TABLET ORAL WEEKLY
Qty: 78 TABLET | Refills: 0 | Status: SHIPPED | OUTPATIENT
Start: 2024-06-04 | End: 2024-09-03

## 2024-06-04 RX ORDER — ERGOCALCIFEROL 1.25 MG/1
50000 CAPSULE ORAL WEEKLY
Qty: 12 CAPSULE | Refills: 0 | Status: SHIPPED | OUTPATIENT
Start: 2024-06-04 | End: 2024-08-27

## 2024-06-04 NOTE — PROGRESS NOTES
Rheumatology f/u Patient Note  =====================================================================================================    Chief complaint: undifferentiated connective tissue disease (UCTD)   Chief Complaint   Patient presents with    Follow - Up     LOV 10/19/2023. Pt states she has a lot more pain in her joints mostly in the hands and wrists. She states the pain is also bad in her ankles and toes but does say it is all over 7/10. Rapid 3 score is a 3.3.     PCP  Maine Barnes DO  Fax: [phone]  Phone: [phone]  =====================================================================================================  HPI Date of visit: 2/1/2023  ?   Adelina Desir is a 43 year old female     Patient here for evaluation and management of possible undifferentiated connective tissue disease in the context of a + ELIESER, + centromere, + dsDNA.  Joint pain/stiffness and fatigue had been persistent for quite some time now.  Patient was eventually evaluated by rheumatology (Dr. Valles).  Multiple positive serologies were noted as detailed above.  There was concern for limited scleroderma versus SLE .  Patient was then started on hydroxychloroquine for the past 2 weeks  -notes reflux and dysphagia. Seeing ENT (Dr. Xiong). Mild/moderate of distal 1/2 esophagus on barium swallow. Apple cider vinegar is help with this.  -fingers/toes/wrists/ankles are more painful/aching.  -noted Raynauds just this past month in the fingers and toes (picture of this)  -no significant finger puffiness  -itchy scalp  -more hair thinning recently   -some intermittent arm jerking/spasm.  -skin thickening of the toes.  -red dots on the legs.   -chronic migraines; getting botox. On elavil, increased  -notes rest leg syndromes   -dry eye/nose/mouth:   -Ophthalmology note  ([facility] Clinically significant reduction in TBUT.  Borderline tear meniscus 4 mm).  Positive fluorescein staining is present.  Tears  demonstrate normal surface qualities.  -started on hydroxychloroquine (plaquenil) 200 mg daily x 2 weeks.   -some BUTLER.   -infected tooth s/p root canal: on augmentin for 7-10 days.  Will be getting a root canal for infected tooth which should take care of the matter per densitst   -medrol dose pack helps with joint pain. Took this last fall.  Took another Medrol dose pack starting yesterday for tooth issue.  -Vyvanse helped with fatigue, but only partially.   -cannabis: edibles  SurgHx:  S/p VERA in 2009: had a IUD. Significant pain, so this was removed.   FHx:  Mother: fibromyalgia, chronic fatigue  Sister:   GERD- yes  Raynauds- yes, since 2022  Constipation- yes  Skin thickening-possible  Oral ulcers- no  Digital ulcers- no  Kidney problems-no  Joint pains/swelling-yes  ==============================================================================================================  Visit: 10/19/23  Worse hand arthralgia in the cold.   -takes prednisone 1 mg prn for bad arthritis days. Takes 2-3 times per month. Took while she had covid a few months back.  Took a higher dose starting with 6 pills daily then decreasing to 1 thereafter for her COVID symptoms.  Took Paxlovid.  Recovered from COVID but still with some intermittent dyspnea and cough.  -hydroxychloroquine (plaquenil) is helping with bilateral hand arthritis.   -her parotid glands can swell and are painful.   -Did not start pilocarpine yet  ==============================================================================================================  Today's Visit: 06/04/24    Wrist/fingers/ankles/toes are painful, moreso recently.     Continues on hydroxychloroquine (plaquenil) 2 pills daily    Rashes in the nasal region, near the nares. Mupirocin helps.     Pilocarpine is helping with dry mouth.     Taking prednisone as needed, twice a month    Using ACV.  Esophageal spasm, saw GI, prescribed Prevacid.     Dry eye: meibo helping    5 point ROS negative  except noted above  I had reviewed past medical and family histories together with allergy and medication lists documented.      Medications:  Current Outpatient Medications on File Prior to Visit   Medication Sig Dispense Refill    lisdexamfetamine (VYVANSE) 50 MG Oral Cap Take 1 tablet daily 30 capsule 0    amitriptyline 25 MG Oral Tab Take 1 tablet (25 mg total) by mouth nightly. 30 tablet 5    Rizatriptan Benzoate 10 MG Oral Tab Take 1 tablet by mouth at onset, may repeat once after 2 hours. Max of 2 tablets in 24 hours. Max of 12 tablets for 30 days. 12 tablet 5    MIEBO 1.338 GM/ML Ophthalmic Solution INSTILL ONE DROP INTO BOTH EYES TWO TIMES DAILY AS DIRECTED      hydroxychloroquine 200 MG Oral Tab Take 1 tablet (200 mg total) by mouth 2 (two) times daily. 180 tablet 1    pilocarpine 5 MG Oral Tab Take 1 tablet (5 mg total) by mouth 3 (three) times daily as needed (dry mouth).      FLUoxetine HCl 60 MG Oral Tab Take 1 tablet by mouth daily. 90 tablet 1    predniSONE 1 MG Oral Tab Take 5 tablets (5 mg total) by mouth daily with breakfast. (Patient taking differently: Take 5 tablets (5 mg total) by mouth daily with breakfast. Pt taking as needed) 150 tablet 0    onabotulinumtoxinA (BOTOX) 200 units Injection Recon Soln Physician to inject 155 unit intramuscularly every three months to multiple sites of head, neck, scalp for chronic migraine prophylaxis. 1 each 3    fluticasone propionate 50 MCG/ACT Nasal Suspension 2 sprays by Nasal route daily as needed. 3 each 1    Cholecalciferol (VITAMIN D3) 1000 UNITS Oral Cap Take 1 tablet by mouth daily.        ALPRAZolam 0.5 MG Oral Tab TAKE one half to one TABLET BY MOUTH TWICE DAILY AS NEEDED for anxiety (Patient not taking: Reported on 6/4/2024) 20 tablet 0     No current facility-administered medications on file prior to visit.         Allergies:  No Known Allergies      Objective    Vitals:    06/04/24 1028   BP: 102/68   Pulse: 104   Resp: 16   Temp: 97.9 °F (36.6  °C)   SpO2: 97%   Weight: 184 lb 12.8 oz (83.8 kg)   Height: 5' 2\" (1.575 m)     GEN: NAD, well-nourished.   HEENT: Head: NCAT. Face: No lesions. Eyes: Conjunctiva clear. Sclera are anicteric. PERRLA. EOMs are full.   CV: RRR, no mrg, S1/S2  PULM:  CTAB, easy effort  Extremities: No cyanosis, edema or deformities.   Neurologic: Strength, CN2-12 grossly intact   Psych: normal affect.   Skin: No lesions or rashes.  MSK: 28 joint count performed. No evidence of synovitis in mcp, pip, dip, wrist, elbows, shoulders, hips, knees, ankles, mtp unless otherwise noted. Full ROM of elbows, wrists, knees.     PtGA: 7  SJ: 2 (MCPs)  TJ: 10 (MCPs/PIPs)  MDGA: 3    CDAI: 22  ?  Labs:    Lab Results   Component Value Date    WBC 6.2 06/03/2024    RBC 4.40 06/03/2024    HGB 14.1 06/03/2024    HCT 43.5 06/03/2024    .0 06/03/2024    MCV 98.9 06/03/2024    MCH 32.0 06/03/2024    MCHC 32.4 06/03/2024    RDW 12.1 06/03/2024    NEPRELIM 3.75 06/03/2024    NEPERCENT 60.8 06/03/2024    LYPERCENT 26.1 06/03/2024    MOPERCENT 7.1 06/03/2024    EOPERCENT 4.9 06/03/2024    BAPERCENT 0.8 06/03/2024    NE 3.75 06/03/2024    LYMABS 1.61 06/03/2024    MOABSO 0.44 06/03/2024    EOABSO 0.30 06/03/2024    BAABSO 0.05 06/03/2024     Lab Results   Component Value Date    GLU 75 06/03/2024    BUN 17 06/03/2024    CREATSERUM 0.75 06/03/2024    ANIONGAP 7 06/03/2024    GFR 84 08/03/2017    GFRNAA 78 09/28/2021    GFRAA 89 09/28/2021    CA 9.0 06/03/2024    OSMOCALC 286 06/03/2024    ALKPHO 84 06/03/2024    AST 29 06/03/2024    ALT 29 06/03/2024    BILT 0.3 06/03/2024    TP 7.3 06/03/2024    ALB 3.5 06/03/2024    GLOBULIN 3.8 06/03/2024     06/03/2024    K 4.0 06/03/2024     06/03/2024    CO2 25.0 06/03/2024     Lab Results   Component Value Date    ESRML 28 (H) 10/13/2022    ESRML 15 07/03/2018    ESRML 8 02/23/2018    ESRML 9 11/05/2014    CRP 0.49 (H) 06/03/2024    CRP 1.19 (H) 10/13/2022        5/2023  Centromere 15.3, rest of CMP  WNL  ELIESER is positive  dsDNA by IFA is negative  ELIESER 1: 320 homogenous  Centromere 13.9, rest of extractable nuclear antigens are negative  B12 474    3/2023  Vitamin B12 474  Ferritin 38.3  Percent sat is 22  Hepatitis B/C is negative  U/a is negative      11/2022  Myomarker 3 plus is negative  ELIESER HERNANDO panel: Centromere 1.3  dsDNABy IFA Crithidia is positive at 1:20, dsDNA by EIA is negative  C3/C4 161/37  ANCA, MPO/MN-3 is negative.  Atypical p-ANCA is negative  Vitamin D39.1  G6PD is negative  CK 67    10/2022   ELIESER panel is positive for centromere 16.0 (upper limit of normal is 7, 7-10 is borderline)  SPEP is negative  Quantitative immunoglobulins negative  Creatinine 0.88, rest of CMP is normal  CRP 1.19 mg/DL  Sed rate 28  CCP 1.6  Uric acid normal  RF negative    12/2022:     Summary:     1. Left ventricle: The cavity size is normal. Wall thickness is mildly      increased. Systolic function is normal. The estimated ejection fraction      is 55-60%. Wall motion is normal; there are no regional wall motion      abnormalities.   2. Aortic valve: Peak velocity (S): 1.3m/sec.   3. Right ventricle: Estimation of the right ventricular systolic pressure is      within the normal range.   4. Pericardium, extracardiac: There is no significant pericardial effusion.         Additional Labs:    Radiology:    Radiology review:        =====================================================================================================  Assessment and Plan    Assessment:  1. Undifferentiated connective tissue disease (HCC)    2. Xerostomia    3. Therapeutic drug monitoring    4. Sjogren syndrome, unspecified (HCC)       #Undifferentiated connective tissue disease: Diagnosed in late 2022.  --Relevant Clinical Manifestations: Inflammatory arthralgia, esophageal dysmotility, suspected Raynaud's, dry eye (confirmed with objective tear deficiency, +fluorescein stain and low TBUT), dry mouth/nose, malar erythema,  telangiectasias  --Serologies/Laboratory findings: +ELIESER, +centromere, +dsDNA (by IFA, negative EIA)  --Patient does NOT have clear evidence of skin tightening, sclerodactyly, or digital pits/ulcers that would be more definitive for a possible early scleroderma.  Patient with recent digital phasic changes concerning for Raynaud's.  Raynaud's may be the heralding symptom of later scleroderma but the systemic condition may take up to several years to manifest.   -- Inflammatory arthritis with positive response to systemic corticosteroids in the past and previously in remission with hydroxychloroquine monotherapy. Now more active today with synovitis in the MCPs/PIPs. CDAI 22 today indicating moderate disease activity.     High risk medication labs including CMP and CBC w/ diff reviewed from 5/2024 Results are stable. 2023: HBsAg, HBcAB total negative, HCV neg, IGRA neg  -S/p hysterectomy  -Scant EtOH    Plan:  -Ultrasound of the parotid glands to evaluate parotid thickening and to evaluate for Sjogren's parotid gland changes    Get Prevnar 20. Wait 1 week to start methotrexate.     -for active inflammatory arthritis Start methotrexate 10 mg (4 tablets) once weekly for 2 weeks (take at night on Friday)  - Then increase methotrexate to 15 mg (6 tablets) weekly.  - Repeat monitoring blood work 5 weeks and 10 weeks after starting methotrexate.  -This blood work includes two orders: a comprehensive metabolic panel (CMP) and a complete blood count with differential (CBC w/ diff) . These orders are in the system as standing bloodwork.   -Take folic acid 3 mg daily.     -continue hydroxychloroquine (plaquenil) 200 mg twice daily    -With the 1 mg prednisone tablets: take prednisone prn only  -Take 2 mg for a mildly symptomatic day  -Take 5 mg for a moderately symptomatic day  -Take 7 mg for a severe day.     For significant dry mouth/Sjogren's and worsening dental caries per dentist: continue pilocarpine, can increase up to 3  times a day.     The Risks of Methotrexate were reviewed extensively with the patient. patient had the opportunity to ask questions and these were answered to their satisfaction.  Risks included and not limited to infections, oral ulcers, hairloss, Gastrointestinal, liver, hematological (blood) and pulmonary toxicity.  There is also an increase risk of lymphoma and teratogenicity (harmful to developing fetus). If patiens wishes to have children, it is advised that the medication be discontinued at least 3 months prior to conception. If the patients has an infection, Methotrexate should be stopped and restarted after the infection has resolved. This should be done in consultation with the treating physician.  It is also advised to stop the medication prior to surgical procedures. This should also be discussed and coordinated with the treating provider. Patient should not drink any alcohol while on methotrexate due to liver toxicity.     Laboratory work to evaluate kidneys, blood counts and liver will be obtain every 3-4 weeks while the medication dose is increased.  After the medication reaches a stable dose, the frequency of lab monitoring can be decreased if there are no side effects.   The medication may take up 3 months to take full effect.      Patient was given ACR Methotrexate handout to read over.        Diagnoses and all orders for this visit:    Undifferentiated connective tissue disease (HCC)  -     methotrexate 2.5 MG Oral Tab; Take 6 tablets (15 mg total) by mouth once a week.  -     folic acid 1 MG Oral Tab; Take 3 tablets (3 mg total) by mouth daily.  -     Comp Metabolic Panel (14); Standing  -     CBC With Differential With Platelet; Standing    Xerostomia  -     methotrexate 2.5 MG Oral Tab; Take 6 tablets (15 mg total) by mouth once a week.  -     folic acid 1 MG Oral Tab; Take 3 tablets (3 mg total) by mouth daily.  -     Comp Metabolic Panel (14); Standing  -     CBC With Differential With  Platelet; Standing    Therapeutic drug monitoring  -     Comp Metabolic Panel (14); Standing  -     CBC With Differential With Platelet; Standing    Sjogren syndrome, unspecified (HCC)            Return in about 4 months (around 10/4/2024).      The above plan of care, diagnosis, orders, and follow-up were discussed with the patient. Questions related to this recommended plan of care were answered.    Thank you for referring this delightful patient to me. Please feel free to contact me with any questions.     This report was performed utilizing speech recognition software technology. Despite proofreading, speech recognition errors could escape detection. If a word or phrase is confusing or out of context, please do not hesitate to call for   clarification.       Kind regards      Philip Johnson MD  [facility]

## 2024-06-04 NOTE — PROGRESS NOTES
Lab results showed low vitamin D.  Advise prescription vitamin D 50,000 international units once weekly for 12 weeks. After 3 months start a maintenance dose of over-the-counter vitamin D at 1,000 international units .  Elevated LDL in the lipid testing Decrease saturated fats and avoid processed foods.  Tries to avoid fatty foods if eating meat try lean cuts of meat such as chicken and fish (salmon and tuna).  If able, add doris seeds, almonds, walnuts, pumpkin seeds, sunflower seeds, beans, whole grain cereals to diet.   Cook with avocado oil, grape seed oil or olive oil. Review the Mediterranean diet on line.  Normal thyroid testing  Normal B12 level  Sincerely,   Lucila Aponte PA-C

## 2024-06-04 NOTE — PATIENT INSTRUCTIONS
Get Prevnar 20. Wait 1 week to start methotrexate.     -Start methotrexate 10 mg (4 tablets) once weekly for 2 weeks (take at night on Friday)  - Then increase methotrexate to 15 mg (6 tablets) weekly.  - Repeat monitoring blood work 5 weeks and 10 weeks after starting methotrexate.  -This blood work includes two orders: a comprehensive metabolic panel (CMP) and a complete blood count with differential (CBC w/ diff) . These orders are in the system as standing bloodwork.   -Take folic acid 3 mg daily.       -continue hydroxychloroquine (plaquenil) 200 mg twice daily    -With the 1 mg prednisone tablets: take prednisone prn only  -Take 2 mg for a mildly symptomatic day  -Take 5 mg for a moderately symptomatic day  -Take 7 mg for a severe day.     For significant dry mouth/Sjogren's and worsening dental caries per dentist: continue pilocarpine, can increase up to 3 times a day.

## 2024-06-05 ENCOUNTER — PATIENT MESSAGE (OUTPATIENT)
Dept: RHEUMATOLOGY | Facility: CLINIC | Age: 44
End: 2024-06-05

## 2024-06-05 DIAGNOSIS — M35.9 UNDIFFERENTIATED CONNECTIVE TISSUE DISEASE (HCC): Primary | ICD-10-CM

## 2024-06-05 LAB — DSDNA AB TITR SER: <10 {TITER}

## 2024-06-05 RX ORDER — METHOTREXATE 25 MG/ML
15 INJECTION, SOLUTION INTRA-ARTERIAL; INTRAMUSCULAR; INTRAVENOUS WEEKLY
Qty: 24 ML | Refills: 0 | Status: SHIPPED | OUTPATIENT
Start: 2024-06-05 | End: 2024-09-03

## 2024-06-05 NOTE — TELEPHONE ENCOUNTER
From: Adelina Desir  To: Philip Johnson  Sent: 6/5/2024 12:50 AM CDT  Subject: Methotrexate    Hi Dr. Johnson!    Instead of starting methotrexate in pill form, can I do the injections instead? I’m already having issues with the pills I take because of the esophageal spasms, so I really hate to add on more if it’s available in another form.

## 2024-06-06 DIAGNOSIS — F98.8 ATTENTION DEFICIT DISORDER (ADD) WITHOUT HYPERACTIVITY: ICD-10-CM

## 2024-06-06 RX ORDER — LISDEXAMFETAMINE DIMESYLATE 50 MG/1
CAPSULE ORAL
Qty: 30 CAPSULE | Refills: 0 | Status: SHIPPED | OUTPATIENT
Start: 2024-06-06

## 2024-06-06 NOTE — TELEPHONE ENCOUNTER
Linda/Juan, could you send in methotrexate supplies: including syringes, needles, alcohol wipes. I sent in the methotrexate subcutaneous med. Schedule nursing teaching with her.

## 2024-06-10 RX ORDER — BLOOD SUGAR DIAGNOSTIC
1 STRIP MISCELLANEOUS WEEKLY
Qty: 1 EACH | Refills: 0 | Status: SHIPPED | OUTPATIENT
Start: 2024-06-10

## 2024-06-10 RX ORDER — NEEDLES, SAFETY 22GX1 1/2"
NEEDLE, DISPOSABLE MISCELLANEOUS
Qty: 12 EACH | Refills: 0 | Status: SHIPPED | OUTPATIENT
Start: 2024-06-10

## 2024-06-28 DIAGNOSIS — M35.9 UNDIFFERENTIATED CONNECTIVE TISSUE DISEASE (HCC): Primary | ICD-10-CM

## 2024-06-28 DIAGNOSIS — M35.00 SJOGREN SYNDROME, UNSPECIFIED (HCC): ICD-10-CM

## 2024-06-28 RX ORDER — PILOCARPINE HYDROCHLORIDE 5 MG/1
5 TABLET, FILM COATED ORAL 3 TIMES DAILY PRN
Qty: 270 TABLET | Refills: 1 | Status: SHIPPED | OUTPATIENT
Start: 2024-06-28

## 2024-06-28 NOTE — TELEPHONE ENCOUNTER
LOV:  06/04/2024     Future Appointments   Date Time Provider Department Center   7/10/2024 11:20 AM Emilie Duque MD [facility]   10/4/2024 10:15 AM Philip Johnson MD [facility]       LF:   12//15/2023    QTY:   249    Refills:   1

## 2024-07-10 ENCOUNTER — PATIENT MESSAGE (OUTPATIENT)
Dept: NEUROLOGY | Facility: CLINIC | Age: 44
End: 2024-07-10

## 2024-07-10 ENCOUNTER — OFFICE VISIT (OUTPATIENT)
Dept: NEUROLOGY | Facility: CLINIC | Age: 44
End: 2024-07-10
Payer: COMMERCIAL

## 2024-07-10 ENCOUNTER — NURSE ONLY (OUTPATIENT)
Dept: RHEUMATOLOGY | Facility: CLINIC | Age: 44
End: 2024-07-10
Payer: COMMERCIAL

## 2024-07-10 VITALS
WEIGHT: 183 LBS | HEART RATE: 72 BPM | DIASTOLIC BLOOD PRESSURE: 68 MMHG | RESPIRATION RATE: 16 BRPM | BODY MASS INDEX: 33 KG/M2 | SYSTOLIC BLOOD PRESSURE: 104 MMHG

## 2024-07-10 DIAGNOSIS — G43.709 CHRONIC MIGRAINE W/O AURA W/O STATUS MIGRAINOSUS, NOT INTRACTABLE: Primary | ICD-10-CM

## 2024-07-10 PROCEDURE — 99211 OFF/OP EST MAY X REQ PHY/QHP: CPT | Performed by: INTERNAL MEDICINE

## 2024-07-10 PROCEDURE — 64615 CHEMODENERV MUSC MIGRAINE: CPT | Performed by: OTHER

## 2024-07-10 NOTE — PROGRESS NOTES
Patient came in for Methotrextate teaching. Name and  verified. Patient educated on proper handling/storage/disposal of medications. Patient informed of proper injection and aseptic technique. Patient educated on potential side effects. Educational materials given to patient on medication.  Patient practiced several times with syringe with great technique. Patient then injected in to demo injection pad with excellent technique under RN supervision. Patient questions and concerns answered. Patient instructed to call us with any further questions.    Pt. states she will start Methotrexate on Friday. She has her supplies.

## 2024-07-10 NOTE — PATIENT INSTRUCTIONS
Refill policies:    Allow 2-3 business days for refills; controlled substances may take longer.  Contact your pharmacy at least 5 days prior to running out of medication and have them send an electronic request or submit request through the “request refill” option in your [employer] account.  Refills are not addressed on weekends; covering physicians do not authorize routine medications on weekends.  No narcotics or controlled substances are refilled after noon on Fridays or by on call physicians.  By law, narcotics must be electronically prescribed.  A 30 day supply with no refills is the maximum allowed.  If your prescription is due for a refill, you may be due for a follow up appointment.  To best provide you care, patients receiving routine medications need to be seen at least once a year.  Patients receiving narcotic/controlled substance medications need to be seen at least once every 3 months.  In the event that your preferred pharmacy does not have the requested medication in stock (e.g. Backordered), it is your responsibility to find another pharmacy that has the requested medication available.  We will gladly send a new prescription to that pharmacy at your request.    Scheduling Tests:    If your physician has ordered radiology tests such as MRI or CT scans, please contact Central Scheduling at [phone] right away to schedule the test.  Once scheduled, the [facility] Centralized Referral Team will work with your insurance carrier to obtain pre-certification or prior authorization.  Depending on your insurance carrier, approval may take 3-10 days.  It is highly recommended patients assure they have received an authorization before having a test performed.  If test is done without insurance authorization, patient may be responsible for the entire amount billed.      Precertification and Prior Authorizations:  If your physician has recommended that you have a procedure or additional testing performed the [facility]  Centralized Referral Team will contact your insurance carrier to obtain pre-certification or prior authorization.    You are strongly encouraged to contact your insurance carrier to verify that your procedure/test has been approved and is a COVERED benefit.  Although the [facility] Centralized Referral Team does its due diligence, the insurance carrier gives the disclaimer that \"Although the procedure is authorized, this does not guarantee payment.\"    Ultimately the patient is responsible for payment.   Thank you for your understanding in this matter.  Paperwork Completion:  If you require FMLA or disability paperwork for your recovery, please make sure to either drop it off or have it faxed to our office at [phone]. Be sure the form has your name and date of birth on it.  The form will be faxed to our Forms Department and they will complete it for you.  There is a 25$ fee for all forms that need to be filled out.  Please be aware there is a 10-14 day turnaround time.  You will need to sign a release of information (MICHELLE) form if your paperwork does not come with one.  You may call the Forms Department with any questions at [phone].  Their fax number is [phone].

## 2024-07-10 NOTE — PROGRESS NOTES
Patient states migraines started up this week due to the rain.    Paperwork noting that patient may bear financial responsibility for procedure(s) performed in clinic today signed prior to proceeding with procedure(s).    Furthermore, patient notified that they should contact their insurer to verify that your procedure/test has been approved and is a COVERED benefit.  Although the [facility] staff does its due diligence, the insurance carrier gives the disclaimer that \"Although the procedure is authorized, this does not guarantee payment.\"    Ultimately the patient is responsible for payment.    Botox is:  [] Buy and Bill  [x] Patient Supplied      Botox Reauthorization Questions:  Has the patient experienced a reduction in frequency of migraines since starting Botox? yes  If yes, by what percentage? 50%  Has the intensity of migraines decreased since starting Botox? yes  If yes, by what percentage? 50%    [x] I have discussed with patient that if their insurance changes they must contact the office right away with that information so that a new prior authorization can be completed.  Patient verbalized understanding that Botox cannot be performed without a current prior authorization in place with correct insurance.

## 2024-07-11 ENCOUNTER — PATIENT MESSAGE (OUTPATIENT)
Dept: FAMILY MEDICINE CLINIC | Facility: CLINIC | Age: 44
End: 2024-07-11

## 2024-07-11 RX ORDER — ROPINIROLE 0.25 MG/1
0.25 TABLET, FILM COATED ORAL NIGHTLY
Qty: 30 TABLET | Refills: 2 | Status: SHIPPED | OUTPATIENT
Start: 2024-07-11

## 2024-07-11 NOTE — TELEPHONE ENCOUNTER
From: Adelina Desir  To: Emilie Duque  Sent: 7/10/2024 7:57 PM CDT  Subject: RLS    Hi Dr. Duque!  I forgot to ask during my visit today…do you have any recommendations for restless leg? It is severely affecting my sleep nearly every night.

## 2024-07-12 NOTE — TELEPHONE ENCOUNTER
From: Adelina Desir  To: Lucila Aponte  Sent: 7/11/2024 11:59 AM CDT  Subject: Fluoxetine - Medical Necessity    Gareth Gaytan,  My insurance company is playing games again. They said they need a letter of medical necessity for the Fluoxetine 60mg tablets. The reason I take the 60mg tablets instead of 3x20mg capsules is because of my esophageal dysmotility. Taking capsules at night causes me all kinds of grief. :(

## 2024-07-16 NOTE — TELEPHONE ENCOUNTER
Called [facility]. Message they are receiving is that the medication is non-formulary.  Called [facility] [phone]  ID [id number]  Representative said the med is non-formulary so she will send a request to the exception department to fax a form to our office.

## 2024-07-17 DIAGNOSIS — F98.8 ATTENTION DEFICIT DISORDER (ADD) WITHOUT HYPERACTIVITY: ICD-10-CM

## 2024-07-17 RX ORDER — FLUOXETINE HYDROCHLORIDE 40 MG/1
CAPSULE ORAL
Qty: 90 CAPSULE | Refills: 0 | Status: SHIPPED | OUTPATIENT
Start: 2024-07-17

## 2024-07-17 RX ORDER — FLUOXETINE HYDROCHLORIDE 20 MG/1
CAPSULE ORAL
Qty: 90 CAPSULE | Refills: 0 | Status: SHIPPED | OUTPATIENT
Start: 2024-07-17

## 2024-07-17 NOTE — TELEPHONE ENCOUNTER
Fluoxetine 60mg tablets was denied.      Lucila   Can she take an alternative dose of this?  40mg an 20mg caps

## 2024-07-18 ENCOUNTER — PATIENT MESSAGE (OUTPATIENT)
Dept: FAMILY MEDICINE CLINIC | Facility: CLINIC | Age: 44
End: 2024-07-18

## 2024-07-18 RX ORDER — LISDEXAMFETAMINE DIMESYLATE 50 MG/1
CAPSULE ORAL
Qty: 30 CAPSULE | Refills: 0 | OUTPATIENT
Start: 2024-07-18

## 2024-07-18 NOTE — TELEPHONE ENCOUNTER
From: Adelina Desir  To: Lucila Aponte  Sent: 7/18/2024 11:53 AM CDT  Subject: Vyvanse Refill    Hi Lucila,  Can you please tell me why my refill request was denied for the Vyvanse?

## 2024-07-23 PROBLEM — G25.81 RLS (RESTLESS LEGS SYNDROME): Status: ACTIVE | Noted: 2024-07-23

## 2024-08-20 DIAGNOSIS — K21.9 GASTROESOPHAGEAL REFLUX DISEASE, UNSPECIFIED WHETHER ESOPHAGITIS PRESENT: ICD-10-CM

## 2024-08-20 DIAGNOSIS — R76.8 POSITIVE ANA (ANTINUCLEAR ANTIBODY): ICD-10-CM

## 2024-08-20 DIAGNOSIS — Z11.59 NEED FOR HEPATITIS B SCREENING TEST: ICD-10-CM

## 2024-08-20 DIAGNOSIS — R76.8 DS DNA ANTIBODY POSITIVE: ICD-10-CM

## 2024-08-20 DIAGNOSIS — Z11.1 SCREENING FOR TUBERCULOSIS: ICD-10-CM

## 2024-08-20 DIAGNOSIS — Z51.81 THERAPEUTIC DRUG MONITORING: ICD-10-CM

## 2024-08-20 DIAGNOSIS — R53.83 OTHER FATIGUE: ICD-10-CM

## 2024-08-20 DIAGNOSIS — M35.9 UNDIFFERENTIATED CONNECTIVE TISSUE DISEASE (HCC): Primary | ICD-10-CM

## 2024-08-20 DIAGNOSIS — K22.4 ESOPHAGEAL DYSMOTILITY: ICD-10-CM

## 2024-08-20 DIAGNOSIS — Z11.59 NEED FOR HEPATITIS C SCREENING TEST: ICD-10-CM

## 2024-08-20 DIAGNOSIS — R76.8 CENTROMERE ANTIBODY POSITIVE: ICD-10-CM

## 2024-08-21 RX ORDER — PERFLUOROHEXYLOCTANE 1 MG/MG
1 SOLUTION OPHTHALMIC 2 TIMES DAILY PRN
Qty: 9 ML | Refills: 1 | Status: SHIPPED | OUTPATIENT
Start: 2024-08-21

## 2024-08-21 RX ORDER — PREDNISONE 1 MG/1
5 TABLET ORAL
Qty: 150 TABLET | Refills: 0 | Status: SHIPPED | OUTPATIENT
Start: 2024-08-21

## 2024-08-21 NOTE — TELEPHONE ENCOUNTER
Last office visit: 6/4/2024    Next Rheum Apt:10/4/2024 Philip Johnson MD    Last fill: 10/19/2023  150 tabs, 0 refills     Labs:   Lab Results   Component Value Date    CREATSERUM 0.75 06/03/2024    GFR 84 08/03/2017    ALKPHO 84 06/03/2024    AST 29 06/03/2024    ALT 29 06/03/2024    BILT 0.3 06/03/2024    TP 7.3 06/03/2024    TP 6.7 06/03/2024    ALB 3.5 06/03/2024    ALB 3.87 06/03/2024       Lab Results   Component Value Date    WBC 6.2 06/03/2024    HGB 14.1 06/03/2024    .0 06/03/2024    NEPRELIM 3.75 06/03/2024    NEPERCENT 60.8 06/03/2024    LYPERCENT 26.1 06/03/2024    NE 3.75 06/03/2024    LYMABS 1.61 06/03/2024

## 2024-08-22 ENCOUNTER — PATIENT MESSAGE (OUTPATIENT)
Dept: FAMILY MEDICINE CLINIC | Facility: CLINIC | Age: 44
End: 2024-08-22

## 2024-08-22 DIAGNOSIS — R53.82 CHRONIC FATIGUE: Primary | ICD-10-CM

## 2024-08-22 NOTE — TELEPHONE ENCOUNTER
From: Adelina Desir  To: Lucila Aponte  Sent: 8/22/2024 9:28 AM CDT  Subject: Labs    Gareth Gaytan,  I forgot to ask on during my video visit if you could please order some new labs for hormone testing. I think it's been about 2 years since then and based on the extreme fatigue I feel (sometimes even when on Vyvanse) and basically zero interest in sex, I would like to see if anything has changed with my hormone levels.     Sex Hormones:  DHEA-S  Estradiol (E2)  Estrogens, Total  Pregnenolone  Progesterone  Testosterone (Free and Total)  Sex Hormone Binding Globulin (SHBG)    Stress Hormone:  Cortisol    Thyroid: I know we already tested the TSH and Free-T4, but they were borderline low, so can we do the Free-T3 as well?    I'm just tired of being so damn tired all the time. :(

## 2024-08-27 ENCOUNTER — TELEPHONE (OUTPATIENT)
Dept: NEUROLOGY | Facility: CLINIC | Age: 44
End: 2024-08-27

## 2024-08-27 NOTE — TELEPHONE ENCOUNTER
Botox received in:  [facility], [address] [address]  [address]    Lot No: [id number]  Exp Date: 8/27/24  # Units: 200     Pt has Appt scheduled on 10/4/24 with Dr. Duque.   Botox given to JUNIOR Mcmillan  to be placed in fridge.  Routed to Prior Authorization team for delivery notification.

## 2024-09-03 NOTE — ED INITIAL ASSESSMENT (HPI)
----- Message from Cheriseraul Mckeon sent at 10/7/2020  3:06 PM CDT -----  Regarding: sooner appt  Contact: Andreas  Type:  Sooner Apoointment Request    Caller is requesting a sooner appointment.  Caller declined first available appointment listed below.  Caller will not accept being placed on the waitlist and is requesting a message be sent to doctor.    Name of Caller:  Andreas  at [facility]  When is the first available appointment?  10/13  Symptoms:  #  Best Call Back Number:  [phone]  Additional Information:  please call to advise       REPORTS LOWER ABD PAIN WITH BILAT FLANK PAIN SINCE THIS AM.  STATES HAS NOTICED BLOOD IN URINE WITH PAIN To increase HDL (the good cholesterol): increase omega-3 fats (fish such as salmon, sardines, cod), walnuts, juan m seeds, flaxseeds, hemp seeds, avocados. Increase exercise, decrease sugar, stop smoking, minimize alcohol     To decrease LDL (the not-as-good cholesterol):  Decrease meat, animal fats, saturated fats, trans fats. Cook with avocado or olive oil instead of butter, lard. Increase fiber, beans, whole grains, and vegetables. Minimize dining out, fried foods, processed/packaged foods    To decrease triglycerides (from extra calories circulating in your blood): increase fruits and vegetables, decrease portion sizes/total calorie intake, decrease simple carbohydrates (white bread, sugar, pastries), decrease alcohol consumption, increase exercise      Hepatic steatosis  You do have persistent hepatic steatosis (fatty liver disease).  While your liver enzymes are not significantly elevated, this condition can cause inflammation and scarring of the liver.  The only treatment / way to reverse this condition is weight loss of about 7-10% of your body weight.  It is also important that you maintain control over your blood sugar, blood pressure, blood lipids (fats) to prevent this from progressing.  We also recommend staying up to date on your hepatitis A and B vaccines.     Avoid alcohol, sugary drinks, or high fat foods. Increase vegetables in the diet and decrease red meat consumption by 50%. Eat a diet with plants, fish, chicken, turkey, eggs. If you eat bread, eat rye bread or whole wheat. If you eat rice, eat brown rice. If you consume dairy, choose low fat dairy products.        - your average blood sugar levels are in the range of prediabetes, which is thought to be the early stages of diabetes.  The exact cause of prediabetes is unknown, but family history and genetics appear to play an important role. Not exercising enough and having increased fat tissue - especially fat around your stomach- also seem to be  important factors in developing diabetes.     The most important thing is to adjust your lifestyle to add more exercise and fiber (as able/tolerated) to help your body handle carbohydrates. Fiber can be in the form of vegetables, or you may also consider adding in some Benefiber (available over the counter) once or twice daily. I recommend that we recheck this lab in 3-6 months.

## 2024-09-09 ENCOUNTER — LAB ENCOUNTER (OUTPATIENT)
Dept: LAB | Age: 44
End: 2024-09-09
Attending: FAMILY MEDICINE
Payer: COMMERCIAL

## 2024-09-09 DIAGNOSIS — R53.82 CHRONIC FATIGUE: ICD-10-CM

## 2024-09-09 LAB
CORTIS SERPL-MCNC: 11.8 UG/DL
DHEA-S SERPL-MCNC: 99.7 UG/DL
ESTRADIOL SERPL-MCNC: 173.1 PG/ML
FSH SERPL-ACNC: 7.7 MIU/ML
LH SERPL-ACNC: 9.7 MIU/ML
PROGEST SERPL-MCNC: 10.41 NG/ML
T3 SERPL-MCNC: 1.73 NG/ML (ref 0.6–1.81)
T4 FREE SERPL-MCNC: 1.1 NG/DL (ref 0.8–1.7)
TSI SER-ACNC: 0.33 MIU/ML (ref 0.55–4.78)

## 2024-09-09 PROCEDURE — 82627 DEHYDROEPIANDROSTERONE: CPT

## 2024-09-09 PROCEDURE — 84140 ASSAY OF PREGNENOLONE: CPT

## 2024-09-09 PROCEDURE — 84439 ASSAY OF FREE THYROXINE: CPT

## 2024-09-09 PROCEDURE — 82670 ASSAY OF TOTAL ESTRADIOL: CPT

## 2024-09-09 PROCEDURE — 84144 ASSAY OF PROGESTERONE: CPT

## 2024-09-09 PROCEDURE — 82533 TOTAL CORTISOL: CPT

## 2024-09-09 PROCEDURE — 83001 ASSAY OF GONADOTROPIN (FSH): CPT

## 2024-09-09 PROCEDURE — 83002 ASSAY OF GONADOTROPIN (LH): CPT

## 2024-09-09 PROCEDURE — 36415 COLL VENOUS BLD VENIPUNCTURE: CPT

## 2024-09-09 PROCEDURE — 84410 TESTOSTERONE BIOAVAILABLE: CPT

## 2024-09-09 PROCEDURE — 84480 ASSAY TRIIODOTHYRONINE (T3): CPT

## 2024-09-09 PROCEDURE — 82671 ASSAY OF ESTROGENS: CPT

## 2024-09-09 PROCEDURE — 84443 ASSAY THYROID STIM HORMONE: CPT

## 2024-09-10 DIAGNOSIS — R79.89 LOW TSH LEVEL: Primary | ICD-10-CM

## 2024-09-11 NOTE — PROGRESS NOTES
T4 free, T3 total are normal range TSH is slightly low repeat thyroid testing in 3 months   DHEA, cortisol, progesterone, estradiol, FSH, LH, normal

## 2024-09-12 LAB
ESTRADIOL: 151 PG/ML
ESTRONE: 154 PG/ML

## 2024-09-13 LAB
SEX HORM BIND GLOB: 119 NMOL/L
TESTOST % FREE+WEAK BND: 5.3 %
TESTOST FREE+WEAK BND: 1.4 NG/DL
TESTOSTERONE TOT /MS: 26.6 NG/DL

## 2024-09-18 LAB — PREGNENOLONE: 29 NG/DL

## 2024-09-27 ENCOUNTER — PATIENT MESSAGE (OUTPATIENT)
Dept: FAMILY MEDICINE CLINIC | Facility: CLINIC | Age: 44
End: 2024-09-27

## 2024-09-27 DIAGNOSIS — E04.1 THYROID NODULE: Primary | ICD-10-CM

## 2024-09-27 NOTE — TELEPHONE ENCOUNTER
From: Adelina Desir  To: Lucila Aponte  Sent: 9/27/2024 1:45 PM CDT  Subject: Thyroid Ultrasound Results    Good Afternoon!  Attached are the Thyroid Ultrasound Results for your review. Please let me know if you recommend any next steps.  Have a great weekend!  Thanks,  Adelina

## 2024-10-02 ENCOUNTER — PATIENT MESSAGE (OUTPATIENT)
Dept: FAMILY MEDICINE CLINIC | Facility: CLINIC | Age: 44
End: 2024-10-02

## 2024-10-02 NOTE — TELEPHONE ENCOUNTER
Monika Sahu,     We left you a voicemail earlier seeing if you would  be open to a 07:00 a.m. visual visit on 10/08/2024 instead of 12:30  because of some scheduling conflicts and us trying to make sure you get enough time with the doctor. Please let us know via my chart or phone @ [phone] if the appointment time changes works for you or not.    Thank you   Margie JULIO

## 2024-10-04 ENCOUNTER — HOSPITAL ENCOUNTER (OUTPATIENT)
Dept: MAMMOGRAPHY | Facility: HOSPITAL | Age: 44
Discharge: HOME | End: 2024-10-04
Attending: FAMILY MEDICINE
Payer: COMMERCIAL

## 2024-10-04 ENCOUNTER — OFFICE VISIT (OUTPATIENT)
Dept: NEUROLOGY | Facility: CLINIC | Age: 44
End: 2024-10-04
Payer: COMMERCIAL

## 2024-10-04 ENCOUNTER — OFFICE VISIT (OUTPATIENT)
Dept: RHEUMATOLOGY | Facility: CLINIC | Age: 44
End: 2024-10-04
Payer: COMMERCIAL

## 2024-10-04 VITALS
RESPIRATION RATE: 16 BRPM | BODY MASS INDEX: 35.11 KG/M2 | TEMPERATURE: 97 F | WEIGHT: 190.81 LBS | HEIGHT: 62 IN | DIASTOLIC BLOOD PRESSURE: 60 MMHG | SYSTOLIC BLOOD PRESSURE: 98 MMHG | HEART RATE: 94 BPM | OXYGEN SATURATION: 95 %

## 2024-10-04 VITALS
DIASTOLIC BLOOD PRESSURE: 60 MMHG | SYSTOLIC BLOOD PRESSURE: 90 MMHG | OXYGEN SATURATION: 98 % | BODY MASS INDEX: 35 KG/M2 | HEART RATE: 86 BPM | WEIGHT: 190 LBS

## 2024-10-04 DIAGNOSIS — Z12.31 VISIT FOR SCREENING MAMMOGRAM: ICD-10-CM

## 2024-10-04 DIAGNOSIS — R53.83 OTHER FATIGUE: ICD-10-CM

## 2024-10-04 DIAGNOSIS — G25.81 RESTLESS LEG SYNDROME: ICD-10-CM

## 2024-10-04 DIAGNOSIS — H04.129 DRY EYE: ICD-10-CM

## 2024-10-04 DIAGNOSIS — Z79.899 IMMUNODEFICIENCY DUE TO DRUG THERAPY (HCC): ICD-10-CM

## 2024-10-04 DIAGNOSIS — T14.8XXA BRUISING: ICD-10-CM

## 2024-10-04 DIAGNOSIS — R76.8 CENTROMERE ANTIBODY POSITIVE: ICD-10-CM

## 2024-10-04 DIAGNOSIS — G43.709 CHRONIC MIGRAINE W/O AURA W/O STATUS MIGRAINOSUS, NOT INTRACTABLE: Primary | ICD-10-CM

## 2024-10-04 DIAGNOSIS — M35.9 UNDIFFERENTIATED CONNECTIVE TISSUE DISEASE (HCC): Primary | ICD-10-CM

## 2024-10-04 DIAGNOSIS — D84.821 IMMUNODEFICIENCY DUE TO DRUG THERAPY (HCC): ICD-10-CM

## 2024-10-04 PROCEDURE — 77067 SCR MAMMO BI INCL CAD: CPT | Performed by: FAMILY MEDICINE

## 2024-10-04 PROCEDURE — 77063 BREAST TOMOSYNTHESIS BI: CPT | Performed by: FAMILY MEDICINE

## 2024-10-04 PROCEDURE — 99214 OFFICE O/P EST MOD 30 MIN: CPT | Performed by: INTERNAL MEDICINE

## 2024-10-04 RX ORDER — ROPINIROLE 0.25 MG/1
0.5 TABLET, FILM COATED ORAL NIGHTLY
Qty: 60 TABLET | Refills: 5 | Status: SHIPPED | OUTPATIENT
Start: 2024-10-04

## 2024-10-04 NOTE — PROGRESS NOTES
Paperwork noting that patient may bear financial responsibility for procedure(s) performed in clinic today signed prior to proceeding with procedure(s).    Furthermore, patient notified that they should contact their insurer to verify that your procedure/test has been approved and is a COVERED benefit.  Although the [facility] staff does its due diligence, the insurance carrier gives the disclaimer that \"Although the procedure is authorized, this does not guarantee payment.\"    Ultimately the patient is responsible for payment.    Botox is:  [x] Buy and Bill  [] Patient Supplied      Botox Reauthorization Questions:  Has the patient experienced a reduction in frequency of migraines since starting Botox? yes  If yes, by what percentage? 50%  Has the intensity of migraines decreased since starting Botox? yes  If yes, by what percentage? 50%    [x] I have discussed with patient that if their insurance changes they must contact the office right away with that information so that a new prior authorization can be completed.  Patient verbalized understanding that Botox cannot be performed without a current prior authorization in place with correct insurance.

## 2024-10-04 NOTE — PATIENT INSTRUCTIONS
Refill policies:    Allow 2-3 business days for refills; controlled substances may take longer.  Contact your pharmacy at least 5 days prior to running out of medication and have them send an electronic request or submit request through the “request refill” option in your [facility] account.  Refills are not addressed on weekends; covering physicians do not authorize routine medications on weekends.  No narcotics or controlled substances are refilled after noon on Fridays or by on call physicians.  By law, narcotics must be electronically prescribed.  A 30 day supply with no refills is the maximum allowed.  If your prescription is due for a refill, you may be due for a follow up appointment.  To best provide you care, patients receiving routine medications need to be seen at least once a year.  Patients receiving narcotic/controlled substance medications need to be seen at least once every 3 months.  In the event that your preferred pharmacy does not have the requested medication in stock (e.g. Backordered), it is your responsibility to find another pharmacy that has the requested medication available.  We will gladly send a new prescription to that pharmacy at your request.    Scheduling Tests:    If your physician has ordered radiology tests such as MRI or CT scans, please contact Central Scheduling at [phone] right away to schedule the test.  Once scheduled, the [facility] Centralized Referral Team will work with your insurance carrier to obtain pre-certification or prior authorization.  Depending on your insurance carrier, approval may take 3-10 days.  It is highly recommended patients assure they have received an authorization before having a test performed.  If test is done without insurance authorization, patient may be responsible for the entire amount billed.      Precertification and Prior Authorizations:  If your physician has recommended that you have a procedure or additional testing performed the [facility]  Centralized Referral Team will contact your insurance carrier to obtain pre-certification or prior authorization.    You are strongly encouraged to contact your insurance carrier to verify that your procedure/test has been approved and is a COVERED benefit.  Although the [facility] Centralized Referral Team does its due diligence, the insurance carrier gives the disclaimer that \"Although the procedure is authorized, this does not guarantee payment.\"    Ultimately the patient is responsible for payment.   Thank you for your understanding in this matter.  Paperwork Completion:  If you require FMLA or disability paperwork for your recovery, please make sure to either drop it off or have it faxed to our office at [phone]. Be sure the form has your name and date of birth on it.  The form will be faxed to our Forms Department and they will complete it for you.  There is a 25$ fee for all forms that need to be filled out.  Please be aware there is a 10-14 day turnaround time.  You will need to sign a release of information (MICHELLE) form if your paperwork does not come with one.  You may call the Forms Department with any questions at [phone].  Their fax number is [phone].

## 2024-10-04 NOTE — PROGRESS NOTES
Rheumatology f/u Patient Note  =====================================================================================================    Chief complaint: undifferentiated connective tissue disease (UCTD)   Chief Complaint   Patient presents with    Follow - Up     LOV 6/4/2024  Rapid 3 score 2.3  Good days/bad days and bad fatigue. Her stress has been decreased significantly. She has not started the methotrexate injection. She had COVID recently and had to take prednisone but is feeling better. Some pain in her hands today due to the colder weather.      PCP  Maine Barnes DO  Fax: [phone]  Phone: [phone]  =====================================================================================================  HPI Date of visit: 2/1/2023  ?   Adelina Desir is a 44 year old female     Patient here for evaluation and management of possible undifferentiated connective tissue disease in the context of a + ELIESER, + centromere, + dsDNA.  Joint pain/stiffness and fatigue had been persistent for quite some time now.  Patient was eventually evaluated by rheumatology (Dr. Valles).  Multiple positive serologies were noted as detailed above.  There was concern for limited scleroderma versus SLE .  Patient was then started on hydroxychloroquine for the past 2 weeks  -notes reflux and dysphagia. Seeing ENT (Dr. Xiong). Mild/moderate of distal 1/2 esophagus on barium swallow. Apple cider vinegar is help with this.  -fingers/toes/wrists/ankles are more painful/aching.  -noted Raynauds just this past month in the fingers and toes (picture of this)  -no significant finger puffiness  -itchy scalp  -more hair thinning recently   -some intermittent arm jerking/spasm.  -skin thickening of the toes.  -red dots on the legs.   -chronic migraines; getting botox. On elavil, increased  -notes rest leg syndromes   -dry eye/nose/mouth:   -Ophthalmology note  ([facility] Clinically significant reduction in TBUT.   Borderline tear meniscus 4 mm).  Positive fluorescein staining is present.  Tears demonstrate normal surface qualities.  -started on hydroxychloroquine (plaquenil) 200 mg daily x 2 weeks.   -some BUTLER.   -infected tooth s/p root canal: on augmentin for 7-10 days.  Will be getting a root canal for infected tooth which should take care of the matter per densitst   -medrol dose pack helps with joint pain. Took this last fall.  Took another Medrol dose pack starting yesterday for tooth issue.  -Vyvanse helped with fatigue, but only partially.   -cannabis: edibles  SurgHx:  S/p VERA in 2009: had a IUD. Significant pain, so this was removed.   FHx:  Mother: fibromyalgia, chronic fatigue  Sister:   GERD- yes  Raynauds- yes, since 2022  Constipation- yes  Skin thickening-possible  Oral ulcers- no  Digital ulcers- no  Kidney problems-no  Joint pains/swelling-yes  ==============================================================================================================  Visit: 10/19/23  Worse hand arthralgia in the cold.   -takes prednisone 1 mg prn for bad arthritis days. Takes 2-3 times per month. Took while she had covid a few months back.  Took a higher dose starting with 6 pills daily then decreasing to 1 thereafter for her COVID symptoms.  Took Paxlovid.  Recovered from COVID but still with some intermittent dyspnea and cough.  -hydroxychloroquine (plaquenil) is helping with bilateral hand arthritis.   -her parotid glands can swell and are painful.   -Did not start pilocarpine yet  ==============================================================================================================  Visit: 06/04/24  Wrist/fingers/ankles/toes are painful, moreso recently.   Continues on hydroxychloroquine (plaquenil) 2 pills daily  Rashes in the nasal region, near the nares. Mupirocin helps.   Pilocarpine is helping with dry mouth.   Taking prednisone as needed, twice a month  Using ACV.  Esophageal spasm, saw GI, prescribed  Prevacid.   Dry eye: meibo helping  ==============================================================================================================  Today's Visit: 10/04/24    Continues on hydroxychloroquine (plaquenil) 2 pills daily  Did not start methotrexate. Worried about side effects.   Taking folic acid which is helping nails.   -taking ropinirole for sleep which is helping.   -continues on pilocarpine for dry mouth.     5 point ROS negative except noted above  I had reviewed past medical and family histories together with allergy and medication lists documented.      Medications:  Current Outpatient Medications on File Prior to Visit   Medication Sig Dispense Refill    methotrexate 2.5 MG Oral Tab Take by mouth once a week. injection      MIEBO 1.338 GM/ML Ophthalmic Solution 1 drop by Each eye route 2 (two) times daily as needed. 9 mL 1    ALPRAZolam 0.5 MG Oral Tab TAKE one half to one TABLET BY MOUTH TWICE DAILY AS NEEDED for anxiety 20 tablet 0    lisdexamfetamine (VYVANSE) 50 MG Oral Cap Take 1 capsule (50 mg total) by mouth daily. 30 capsule 0    FLUoxetine 20 MG Oral Cap Take 1 capsule daily with 40mg capsule to = 60mg daily 90 capsule 0    FLUoxetine HCl 40 MG Oral Cap Take 1 capsule daily with 20mg capsule to =60mg daily 90 capsule 0    rOPINIRole 0.25 MG Oral Tab Take 1 tablet (0.25 mg total) by mouth at bedtime. 30 tablet 2    pilocarpine 5 MG Oral Tab Take 1 tablet (5 mg total) by mouth 3 (three) times daily as needed (dry mouth). 270 tablet 1    amitriptyline 25 MG Oral Tab Take 1 tablet (25 mg total) by mouth nightly. 30 tablet 5    Rizatriptan Benzoate 10 MG Oral Tab Take 1 tablet by mouth at onset, may repeat once after 2 hours. Max of 2 tablets in 24 hours. Max of 12 tablets for 30 days. 12 tablet 5    hydroxychloroquine 200 MG Oral Tab Take 1 tablet (200 mg total) by mouth 2 (two) times daily. 180 tablet 1    onabotulinumtoxinA (BOTOX) 200 units Injection Recon Soln Physician to inject 155  unit intramuscularly every three months to multiple sites of head, neck, scalp for chronic migraine prophylaxis. 1 each 3    fluticasone propionate 50 MCG/ACT Nasal Suspension 2 sprays by Nasal route daily as needed. 3 each 1    Cholecalciferol (VITAMIN D3) 1000 UNITS Oral Cap Take 1 tablet by mouth daily.        predniSONE 1 MG Oral Tab Take 5 tablets (5 mg total) by mouth daily with breakfast. (Patient not taking: Reported on 10/4/2024) 150 tablet 0    Syringe/Needle, Disp, (BD SAFETYGLIDE SYRINGE/NEEDLE) 27G X 5/8\" 1 ML Does not apply Misc To be used for Methotrexate injections (Patient not taking: Reported on 10/4/2024) 12 each 0    Alcohol Swabs (ALCOHOL PADS) 70 % Does not apply Pads 1 each once a week. Clean injection site thoroughly prior to Methotrexate injection. (Patient not taking: Reported on 10/4/2024) 1 each 0     No current facility-administered medications on file prior to visit.         Allergies:  No Known Allergies      Objective    Vitals:    10/04/24 1020   BP: 98/60   Pulse: 94   Resp: 16   Temp: 97.4 °F (36.3 °C)   SpO2: 95%   Weight: 190 lb 12.8 oz (86.5 kg)   Height: 5' 2\" (1.575 m)     GEN: NAD, well-nourished.   HEENT: Head: NCAT. Face: No lesions. Eyes: Conjunctiva clear.   PULM:  easy effort  Extremities: No cyanosis, edema or deformities.   Neurologic: Strength, CN2-12 grossly intact   Skin: No lesions or rashes.  MSK: 28 joint count performed. No evidence of synovitis in mcp, pip, dip, wrist, elbows, shoulders, hips, knees, ankles, mtp unless otherwise noted. Full ROM of elbows, wrists, knees.       PtGA: 3  SJ: 2 (LMCPs)  TJ: 20  MDGA: 3.5    CDAI: 28.5  ?  Labs:    6/2024  SPEP wnl    Lab Results   Component Value Date    WBC 6.2 06/03/2024    RBC 4.40 06/03/2024    HGB 14.1 06/03/2024    HCT 43.5 06/03/2024    .0 06/03/2024    MCV 98.9 06/03/2024    MCH 32.0 06/03/2024    MCHC 32.4 06/03/2024    RDW 12.1 06/03/2024    NEPRELIM 3.75 06/03/2024    NEPERCENT 60.8 06/03/2024     LYPERCENT 26.1 06/03/2024    MOPERCENT 7.1 06/03/2024    EOPERCENT 4.9 06/03/2024    BAPERCENT 0.8 06/03/2024    NE 3.75 06/03/2024    LYMABS 1.61 06/03/2024    MOABSO 0.44 06/03/2024    EOABSO 0.30 06/03/2024    BAABSO 0.05 06/03/2024     Lab Results   Component Value Date    GLU 75 06/03/2024    BUN 17 06/03/2024    CREATSERUM 0.75 06/03/2024    ANIONGAP 7 06/03/2024    GFR 84 08/03/2017    GFRNAA 78 09/28/2021    GFRAA 89 09/28/2021    CA 9.0 06/03/2024    OSMOCALC 286 06/03/2024    ALKPHO 84 06/03/2024    AST 29 06/03/2024    ALT 29 06/03/2024    BILT 0.3 06/03/2024    TP 7.3 06/03/2024    TP 6.7 06/03/2024    ALB 3.5 06/03/2024    ALB 3.87 06/03/2024    GLOBULIN 3.8 06/03/2024     06/03/2024    K 4.0 06/03/2024     06/03/2024    CO2 25.0 06/03/2024     Lab Results   Component Value Date    ESRML 28 (H) 10/13/2022    ESRML 15 07/03/2018    ESRML 8 02/23/2018    ESRML 9 11/05/2014    CRP 0.49 (H) 06/03/2024    CRP 1.19 (H) 10/13/2022        5/2023  Centromere 15.3, rest of CMP WNL  ELIESER is positive  dsDNA by IFA is negative  ELIESER 1: 320 homogenous  Centromere 13.9, rest of extractable nuclear antigens are negative  B12 474    3/2023  Vitamin B12 474  Ferritin 38.3  Percent sat is 22  Hepatitis B/C is negative  U/a is negative      11/2022  Myomarker 3 plus is negative  ELIESER HERNANDO panel: Centromere 1.3  dsDNABy IFA Crithidia is positive at 1:20, dsDNA by EIA is negative  C3/C4 161/37  ANCA, MPO/WV-3 is negative.  Atypical p-ANCA is negative  Vitamin D39.1  G6PD is negative  CK 67    10/2022   ELIESER panel is positive for centromere 16.0 (upper limit of normal is 7, 7-10 is borderline)  SPEP is negative  Quantitative immunoglobulins negative  Creatinine 0.88, rest of CMP is normal  CRP 1.19 mg/DL  Sed rate 28  CCP 1.6  Uric acid normal  RF negative    12/2022:     Summary:     1. Left ventricle: The cavity size is normal. Wall thickness is mildly      increased. Systolic function is normal. The estimated  ejection fraction      is 55-60%. Wall motion is normal; there are no regional wall motion      abnormalities.   2. Aortic valve: Peak velocity (S): 1.3m/sec.   3. Right ventricle: Estimation of the right ventricular systolic pressure is      within the normal range.   4. Pericardium, extracardiac: There is no significant pericardial effusion.         Additional Labs:    Radiology:    Radiology review:        =====================================================================================================  Assessment and Plan    Assessment:  1. Undifferentiated connective tissue disease (HCC)    2. 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 Centromere antibody positive    4. Dry eye    5. Bruising    6. Other fatigue      #Undifferentiated connective tissue disease: Diagnosed in late 2022.  --Relevant Clinical Manifestations: Inflammatory arthralgia, esophageal dysmotility, suspected Raynaud's, dry eye (confirmed with objective tear deficiency, +fluorescein stain and low TBUT), [normal parotid gland on US] dry mouth/nose, malar erythema, telangiectasias  --Serologies/Laboratory findings: +ELIESER, +centromere, +dsDNA (by IFA, negative EIA)  --Patient does NOT have clear evidence of skin tightening, sclerodactyly, or digital pits/ulcers that would be more definitive for a possible early scleroderma.  Patient with recent digital phasic changes concerning for Raynaud's.  Raynaud's may be the heralding symptom of later scleroderma but the systemic condition may take up to several years to manifest.   -- Inflammatory arthritis with positive response to systemic corticosteroids in the past and previously in remission with hydroxychloroquine monotherapy.   --Now more active today with synovitis in the MCPs/PIPs. CDAI 28.5 today indicating severe disease activity. Synovitis is wide spread, but only minimally active in each joint.     High risk medication labs including CMP and CBC w/ diff reviewed from 5/2024 Results  are stable. 2023: HBsAg, HBcAB total negative, HCV neg, IGRA neg  -S/p hysterectomy  -Scant EtOH    Plan:  Get covid vaccine 3 months after covid infection  Get flu shot at least 1 week before starting methotrexate   Get prevnar 20 at least 1 week before starting methotrexate     Message neuro about increasing amitriptyline given brain fog/sleep issues    Start 0.6 mL of methotrexate first; discussed safety of methotrexate and she is amenable.   - Repeat monitoring blood work 5 weeks and 10 weeks after starting methotrexate.    Add on coags given easy bruising    -continue folic acid 3 mg daily.     -continue hydroxychloroquine (plaquenil) 200 mg twice daily    -With the 1 mg prednisone tablets: take prednisone prn only  -Take 2 mg for a mildly symptomatic day  -Take 5 mg for a moderately symptomatic day  -Take 7 mg for a severe day.     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      The above plan of care, diagnosis, orders, and follow-up were  discussed with the patient. Questions related to this recommended plan of care were answered.    Thank you for referring this delightful patient to me. Please feel free to contact me with any questions.     This report was performed utilizing speech recognition software technology. Despite proofreading, speech recognition errors could escape detection. If a word or phrase is confusing or out of context, please do not hesitate to call for   clarification.       Kind regards      Philip Johnson MD  [facility]
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
## 2024-10-04 NOTE — PATIENT INSTRUCTIONS
Get covid vaccine 3 months after covid infection  Get flu shot at least 1 week before starting methotrexate   Get prevnar 20 at least 1 week before starting methotrexate     Start 0.6 mL of methotrexate first  - Repeat monitoring blood work 5 weeks and 10 weeks after starting methotrexate.    -continue folic acid 3 mg daily.     -continue hydroxychloroquine (plaquenil) 200 mg twice daily    -With the 1 mg prednisone tablets: take prednisone prn only  -Take 2 mg for a mildly symptomatic day  -Take 5 mg for a moderately symptomatic day  -Take 7 mg for a severe day.     For significant dry mouth/Sjogren's and worsening dental caries per dentist: continue pilocarpine up to three times daily

## 2024-10-05 DIAGNOSIS — R92.333 HETEROGENEOUSLY DENSE TISSUE OF BOTH BREASTS ON MAMMOGRAPHY: Primary | ICD-10-CM

## 2024-10-05 NOTE — PROGRESS NOTES
Asymmetries within bilateral breast which additional imaging is recommended.    Scheduling Instructions:  To schedule an appointment for your radiology test please call [facility] at [phone].    Due to dense breasts bilaterally you would also benefit from doing a bilateral whole breast screening ultrasound for increased sensitivity for detection of breast cancer which could be obscured secondary to dense breasts.  Order placed if you would like to have this done.      Sincerely,   Lucila Aponte PA-C

## 2024-10-08 ENCOUNTER — HOSPITAL ENCOUNTER (OUTPATIENT)
Dept: ULTRASOUND IMAGING | Facility: HOSPITAL | Age: 44
Discharge: HOME | End: 2024-10-08
Attending: FAMILY MEDICINE
Payer: COMMERCIAL

## 2024-10-08 DIAGNOSIS — E04.1 THYROID NODULE: ICD-10-CM

## 2024-10-08 PROCEDURE — 88173 CYTOPATH EVAL FNA REPORT: CPT | Performed by: FAMILY MEDICINE

## 2024-10-08 PROCEDURE — 10005 FNA BX W/US GDN 1ST LES: CPT | Performed by: FAMILY MEDICINE

## 2024-10-08 PROCEDURE — 10006 FNA BX W/US GDN EA ADDL: CPT | Performed by: FAMILY MEDICINE

## 2024-10-14 ENCOUNTER — TELEPHONE (OUTPATIENT)
Dept: NEUROLOGY | Facility: CLINIC | Age: 44
End: 2024-10-14

## 2024-11-27 NOTE — PROGRESS NOTES
A [employer] message sent to patient reminding her to proceed on with scheduling the diagnostic bilateral mammogram.

## 2024-12-02 RX ORDER — FLUOXETINE 40 MG/1
CAPSULE ORAL
Qty: 90 CAPSULE | Refills: 0 | Status: SHIPPED | OUTPATIENT
Start: 2024-12-02

## 2024-12-12 ENCOUNTER — TELEPHONE (OUTPATIENT)
Dept: NEUROLOGY | Facility: CLINIC | Age: 44
End: 2024-12-12

## 2024-12-12 DIAGNOSIS — G43.709 CHRONIC MIGRAINE W/O AURA W/O STATUS MIGRAINOSUS, NOT INTRACTABLE: ICD-10-CM

## 2024-12-12 NOTE — TELEPHONE ENCOUNTER
Please send a prescription to [facility]  Pharmacy:    Botox 200 units, 3 refills      Si units to be administered by MD into multiple sites of head, neck and scalp every 3 months for chronic migraine prophylaxis.      Patient has appointment on 25.

## 2024-12-16 ENCOUNTER — HOSPITAL ENCOUNTER (OUTPATIENT)
Dept: MAMMOGRAPHY | Facility: HOSPITAL | Age: 44
Discharge: HOME | End: 2024-12-16
Attending: FAMILY MEDICINE
Payer: COMMERCIAL

## 2024-12-16 ENCOUNTER — LAB ENCOUNTER (OUTPATIENT)
Dept: LAB | Age: 44
End: 2024-12-16
Attending: INTERNAL MEDICINE
Payer: COMMERCIAL

## 2024-12-16 ENCOUNTER — TELEPHONE (OUTPATIENT)
Dept: NEUROLOGY | Facility: CLINIC | Age: 44
End: 2024-12-16

## 2024-12-16 DIAGNOSIS — M35.9 UNDIFFERENTIATED CONNECTIVE TISSUE DISEASE (HCC): ICD-10-CM

## 2024-12-16 DIAGNOSIS — R92.2 INCONCLUSIVE MAMMOGRAM: ICD-10-CM

## 2024-12-16 DIAGNOSIS — T14.8XXA BRUISING: ICD-10-CM

## 2024-12-16 DIAGNOSIS — D84.821 IMMUNODEFICIENCY DUE TO DRUG THERAPY (HCC): ICD-10-CM

## 2024-12-16 DIAGNOSIS — Z79.899 IMMUNODEFICIENCY DUE TO DRUG THERAPY (HCC): ICD-10-CM

## 2024-12-16 DIAGNOSIS — R79.89 LOW TSH LEVEL: ICD-10-CM

## 2024-12-16 LAB
ALBUMIN SERPL-MCNC: 4 G/DL (ref 3.2–4.8)
ALBUMIN/GLOB SERPL: 1.3 {RATIO} (ref 1–2)
ALP LIVER SERPL-CCNC: 98 U/L
ALT SERPL-CCNC: 27 U/L
ANION GAP SERPL CALC-SCNC: 6 MMOL/L (ref 0–18)
APTT PPP: 27.3 SECONDS (ref 23–36)
AST SERPL-CCNC: 33 U/L (ref ?–34)
BASOPHILS # BLD AUTO: 0.06 X10(3) UL (ref 0–0.2)
BASOPHILS NFR BLD AUTO: 0.9 %
BILIRUB SERPL-MCNC: 0.3 MG/DL (ref 0.3–1.2)
BUN BLD-MCNC: 12 MG/DL (ref 9–23)
CALCIUM BLD-MCNC: 8.7 MG/DL (ref 8.7–10.4)
CHLORIDE SERPL-SCNC: 108 MMOL/L (ref 98–112)
CO2 SERPL-SCNC: 27 MMOL/L (ref 21–32)
CREAT BLD-MCNC: 0.77 MG/DL
EGFRCR SERPLBLD CKD-EPI 2021: 97 ML/MIN/1.73M2 (ref 60–?)
EOSINOPHIL # BLD AUTO: 0.35 X10(3) UL (ref 0–0.7)
EOSINOPHIL NFR BLD AUTO: 5.4 %
ERYTHROCYTE [DISTWIDTH] IN BLOOD BY AUTOMATED COUNT: 11.8 %
FASTING STATUS PATIENT QL REPORTED: NO
GLOBULIN PLAS-MCNC: 3 G/DL (ref 2–3.5)
GLUCOSE BLD-MCNC: 83 MG/DL (ref 70–99)
HCT VFR BLD AUTO: 43.4 %
HGB BLD-MCNC: 14.7 G/DL
IMM GRANULOCYTES # BLD AUTO: 0.02 X10(3) UL (ref 0–1)
IMM GRANULOCYTES NFR BLD: 0.3 %
INR BLD: 0.97 (ref 0.8–1.2)
LYMPHOCYTES # BLD AUTO: 1.88 X10(3) UL (ref 1–4)
LYMPHOCYTES NFR BLD AUTO: 29.1 %
MCH RBC QN AUTO: 32.1 PG (ref 26–34)
MCHC RBC AUTO-ENTMCNC: 33.9 G/DL (ref 31–37)
MCV RBC AUTO: 94.8 FL
MONOCYTES # BLD AUTO: 0.44 X10(3) UL (ref 0.1–1)
MONOCYTES NFR BLD AUTO: 6.8 %
NEUTROPHILS # BLD AUTO: 3.7 X10 (3) UL (ref 1.5–7.7)
NEUTROPHILS # BLD AUTO: 3.7 X10(3) UL (ref 1.5–7.7)
NEUTROPHILS NFR BLD AUTO: 57.5 %
OSMOLALITY SERPL CALC.SUM OF ELEC: 291 MOSM/KG (ref 275–295)
PLATELET # BLD AUTO: 316 10(3)UL (ref 150–450)
POTASSIUM SERPL-SCNC: 3.8 MMOL/L (ref 3.5–5.1)
PROT SERPL-MCNC: 7 G/DL (ref 5.7–8.2)
PROTHROMBIN TIME: 13 SECONDS (ref 11.6–14.8)
RBC # BLD AUTO: 4.58 X10(6)UL
SODIUM SERPL-SCNC: 141 MMOL/L (ref 136–145)
T4 FREE SERPL-MCNC: 0.9 NG/DL (ref 0.8–1.7)
TSI SER-ACNC: 0.91 UIU/ML (ref 0.55–4.78)
WBC # BLD AUTO: 6.5 X10(3) UL (ref 4–11)

## 2024-12-16 PROCEDURE — 84443 ASSAY THYROID STIM HORMONE: CPT

## 2024-12-16 PROCEDURE — 77062 BREAST TOMOSYNTHESIS BI: CPT | Performed by: FAMILY MEDICINE

## 2024-12-16 PROCEDURE — 80053 COMPREHEN METABOLIC PANEL: CPT

## 2024-12-16 PROCEDURE — 77066 DX MAMMO INCL CAD BI: CPT | Performed by: FAMILY MEDICINE

## 2024-12-16 PROCEDURE — 85730 THROMBOPLASTIN TIME PARTIAL: CPT

## 2024-12-16 PROCEDURE — 36415 COLL VENOUS BLD VENIPUNCTURE: CPT

## 2024-12-16 PROCEDURE — 76641 ULTRASOUND BREAST COMPLETE: CPT | Performed by: FAMILY MEDICINE

## 2024-12-16 PROCEDURE — 85025 COMPLETE CBC W/AUTO DIFF WBC: CPT

## 2024-12-16 PROCEDURE — 84439 ASSAY OF FREE THYROXINE: CPT

## 2024-12-16 PROCEDURE — 85610 PROTHROMBIN TIME: CPT

## 2024-12-16 NOTE — TELEPHONE ENCOUNTER
[facility] [phone] for Botox re-authorization.      Confirmation received.

## 2025-01-02 DIAGNOSIS — G43.019 INTRACTABLE MIGRAINE WITHOUT AURA AND WITHOUT STATUS MIGRAINOSUS: ICD-10-CM

## 2025-01-03 RX ORDER — RIZATRIPTAN BENZOATE 10 MG/1
TABLET ORAL
Qty: 12 TABLET | Refills: 5 | Status: SHIPPED | OUTPATIENT
Start: 2025-01-03

## 2025-01-03 NOTE — TELEPHONE ENCOUNTER
Medication: RIZATRIPTAN BENZOATE 10 MG Oral Tab      Date of last refill: 04/03/2024 (#12/3)  [place]     Last office visit: 10/04/2024  Due back to clinic per last office note:    Date next office visit scheduled:    Future Appointments   Date Time Provider Department Center   1/13/2025  2:30 PM Emilie Duque MD [facility]   2/6/2025  1:40 PM Philip Johnson MD [facility] [address]           Last OV note recommendation:    ASSESSMENT/PLAN:           Encounter Diagnoses   Name Primary?    Chronic migraine w/o aura w/o status migrainosus, not intractable Yes    Restless leg syndrome           Stable overall  Continue Botox therapy  Continue Maxalt as needed        2. Restless leg syndrome  Continue Ropinirole 0.25 mg  1- 2 tabs nightly      See orders and medications filed with this encounter. The patient indicates understanding of these issues and agrees with the plan.        No orders of the defined types were placed in this encounter.        Emilie Duque MD  [facility] [place]

## 2025-01-07 NOTE — TELEPHONE ENCOUNTER
Called to check the status of the Botox authorization on 01/03/25.  Spoke to Sara ELENA who claims they didn't receive my initial request which was sent on 12/16/24.  They replied requesting additional information so it was received.      She suggested I fax form and clinicals to a different fax number ([phone]) and flakito it urgent.  Fax confirmed.     Called to check the status today spoke to Chelsea ROQUE who said they didn't receive anything again.      I asked if there was another alternative.  She said she couldn't take information over the phone but that I could enter the prior auth on their website at [employer].    Entered as urgent and included clinicals.  Requested as buy and bill.  Patient has an appointment scheduled for 01/13/25.

## 2025-01-10 NOTE — TELEPHONE ENCOUNTER
Received faxed authorization from [employer] for Botox.      Auth#[id number] from 01/07/25-01/06/26-4 visits.    This is now buy and bill (no longer [facility]).  Patient is ok with this.

## 2025-01-13 ENCOUNTER — OFFICE VISIT (OUTPATIENT)
Dept: NEUROLOGY | Facility: CLINIC | Age: 45
End: 2025-01-13
Payer: COMMERCIAL

## 2025-01-13 VITALS
WEIGHT: 190 LBS | HEART RATE: 108 BPM | SYSTOLIC BLOOD PRESSURE: 130 MMHG | BODY MASS INDEX: 35 KG/M2 | RESPIRATION RATE: 16 BRPM | DIASTOLIC BLOOD PRESSURE: 70 MMHG

## 2025-01-13 DIAGNOSIS — G43.709 CHRONIC MIGRAINE W/O AURA W/O STATUS MIGRAINOSUS, NOT INTRACTABLE: Primary | ICD-10-CM

## 2025-01-13 NOTE — PATIENT INSTRUCTIONS

## 2025-01-13 NOTE — PROGRESS NOTES
Paperwork noting that patient may bear financial responsibility for procedure(s) performed in clinic today signed prior to proceeding with procedure(s).    Furthermore, patient notified that they should contact their insurer to verify that your procedure/test has been approved and is a COVERED benefit.  Although the [facility] staff does its due diligence, the insurance carrier gives the disclaimer that \"Although the procedure is authorized, this does not guarantee payment.\"    Ultimately the patient is responsible for payment.    Botox is:  [x] Buy and Bill  [] Patient Supplied      Botox Reauthorization Questions:  Has the patient experienced a reduction in frequency of migraines since starting Botox? yes  If yes, by what percentage? 65%  Has the intensity of migraines decreased since starting Botox? yes  If yes, by what percentage? 65%    [x] I have discussed with patient that if their insurance changes they must contact the office right away with that information so that a new prior authorization can be completed.  Patient verbalized understanding that Botox cannot be performed without a current prior authorization in place with correct insurance.

## 2025-02-21 DIAGNOSIS — G43.709 CHRONIC MIGRAINE W/O AURA W/O STATUS MIGRAINOSUS, NOT INTRACTABLE: ICD-10-CM

## 2025-02-21 DIAGNOSIS — F41.1 GAD (GENERALIZED ANXIETY DISORDER): ICD-10-CM

## 2025-02-21 NOTE — TELEPHONE ENCOUNTER
Requested Prescriptions     Pending Prescriptions Disp Refills    FLUoxetine 20 MG Oral Cap [Pharmacy Med Name: FLUOXETINE HCL 20MG CAPS] 90 capsule 0     Sig: TAKE ONE CAPSULE BY MOUTH EVERY DAY (60MG DOSE)       Last Refill: 12/2/24    Last OV: 10/31/24

## 2025-02-21 NOTE — TELEPHONE ENCOUNTER
Medication: Amitriptyline 25mg     Date of last refill: 4/3/24 (#30/5)  [place]      Last office visit: 1/13/25  Due back to clinic per last office note:  12w  Date next office visit scheduled:    Future Appointments   Date Time Provider Department Center   4/14/2025  2:30 PM Emilie Duque MD [facility]           Last OV note recommendation:  10/4/24  Stable overall  Continue Botox therapy  Continue Maxalt as needed        2. Restless leg syndrome  Continue Ropinirole 0.25 mg  1- 2 tabs nightly

## 2025-02-24 RX ORDER — ALPRAZOLAM 0.5 MG
TABLET ORAL
Qty: 20 TABLET | Refills: 0 | Status: SHIPPED | OUTPATIENT
Start: 2025-02-24

## 2025-02-24 NOTE — TELEPHONE ENCOUNTER
Requested Prescriptions     Pending Prescriptions Disp Refills    ALPRAZolam 0.5 MG Oral Tab 20 tablet 0     Sig: TAKE one half to one TABLET BY MOUTH TWICE DAILY AS NEEDED for anxiety       Last Refill: 7/23/24    Last OV: 10/13/24    Please review and advise

## 2025-03-27 ENCOUNTER — PATIENT MESSAGE (OUTPATIENT)
Dept: FAMILY MEDICINE CLINIC | Facility: CLINIC | Age: 45
End: 2025-03-27

## 2025-03-27 DIAGNOSIS — F98.8 ATTENTION DEFICIT DISORDER (ADD) WITHOUT HYPERACTIVITY: ICD-10-CM

## 2025-03-27 RX ORDER — DEXTROAMPHETAMINE SACCHARATE, AMPHETAMINE ASPARTATE, DEXTROAMPHETAMINE SULFATE AND AMPHETAMINE SULFATE 3.75; 3.75; 3.75; 3.75 MG/1; MG/1; MG/1; MG/1
15 TABLET ORAL 2 TIMES DAILY
Qty: 60 TABLET | Refills: 0 | Status: SHIPPED | OUTPATIENT
Start: 2025-03-27 | End: 2025-04-26

## 2025-03-27 NOTE — TELEPHONE ENCOUNTER
Requested Prescriptions     Pending Prescriptions Disp Refills    amphetamine-dextroamphetamine (ADDERALL) 15 MG Oral Tab 60 tablet 0     Sig: Take 1 tablet (15 mg total) by mouth 2 (two) times daily.       Last Refill: 1/18/25    Last OV: 10/31/24

## 2025-04-07 NOTE — TELEPHONE ENCOUNTER
Lucila from [facility] stated she would need a approval for the botox new prior authorize is needed and her call back is  [phone]   
Patients next appointment is 1/13/25.  Will start prior authorization closer to that date.    
2 = A lot of assistance

## 2025-04-14 ENCOUNTER — PATIENT MESSAGE (OUTPATIENT)
Dept: NEUROLOGY | Facility: CLINIC | Age: 45
End: 2025-04-14

## 2025-04-14 DIAGNOSIS — G43.709 CHRONIC MIGRAINE W/O AURA W/O STATUS MIGRAINOSUS, NOT INTRACTABLE: Primary | ICD-10-CM

## 2025-04-14 RX ORDER — METHYLPREDNISOLONE 4 MG/1
TABLET ORAL
Qty: 1 EACH | Refills: 0 | Status: SHIPPED | OUTPATIENT
Start: 2025-04-14

## 2025-04-14 NOTE — TELEPHONE ENCOUNTER
Discussed pt with provider.  Ok to send medrol dose germaine.    Called pt and informed of above and r/s to 4/16/2025 at 0900.  Informed pt to come early, Verbalized understanding.

## 2025-04-14 NOTE — TELEPHONE ENCOUNTER
Called pt and she reports she has had a severe migraine since the morning.  She has taken her Rizatriptan and has not had an relief.      Discussed medrol dose germaine and pt would like RX to help break this migraine.  Pt has prednisone at home, but only uses when she has a flare of connective tissue disease, has not been taking recently.    Routed to provider.

## 2025-04-16 ENCOUNTER — OFFICE VISIT (OUTPATIENT)
Dept: NEUROLOGY | Facility: CLINIC | Age: 45
End: 2025-04-16
Payer: COMMERCIAL

## 2025-04-16 VITALS
OXYGEN SATURATION: 95 % | SYSTOLIC BLOOD PRESSURE: 110 MMHG | HEIGHT: 62 IN | RESPIRATION RATE: 16 BRPM | DIASTOLIC BLOOD PRESSURE: 80 MMHG | WEIGHT: 194 LBS | BODY MASS INDEX: 35.7 KG/M2 | HEART RATE: 99 BPM

## 2025-04-16 DIAGNOSIS — G43.709 CHRONIC MIGRAINE W/O AURA W/O STATUS MIGRAINOSUS, NOT INTRACTABLE: Primary | ICD-10-CM

## 2025-04-16 PROCEDURE — 64615 CHEMODENERV MUSC MIGRAINE: CPT | Performed by: OTHER

## 2025-04-16 RX ORDER — TIRZEPATIDE 2.5 MG/.5ML
INJECTION, SOLUTION SUBCUTANEOUS
COMMUNITY

## 2025-04-16 RX ORDER — PROGESTERONE 100 MG/1
CAPSULE ORAL
COMMUNITY
Start: 2025-04-08

## 2025-04-16 NOTE — PROCEDURES
Procedure: Botox injection -chemodenervation  Consent: obtained after explanation of procedure detail, benefits and risks     Indication:   -chronic migraine patient who suffers 15 or more days with headache lasting 4 hours a day or longer.     Procedure description:  -Chronic Migraine  Dilution is 100 units/2 ml with a final concentration of 5 units per 0.1 ml.  A sterile 30-gauge, 0.5 inch needle as 0.1 ml (5 units) injection per each site. Injections were divided across 7 specific head/neck muscle areas as specified in the table below. All muscles were injected bilaterally with half the number of injection sites administered to the left, and half to the right side of the head and neck. 35 units wasted.        Head/Neck Area Dose (number of sites)   Frontalis bilaterally 20 units divided in 4 sites    bilaterally 10 units divided in 2 sites   Procerus 5 unit in 1 site   Occipitalis bilaterally 30 units divided in 6 sites   Temporalis bilaterally 40 units divided in 8 sites   Trapezius bilaterally 30 units divided in 6 sites   Cervical Paraspinal bilaterally 20 units divided in 2 sites   Total Dose 165 units divided in 31 sites         The procedure was completed successfully without complication during and after the injections, and the patient tolerated well throughout the procedure.        The recommended re-treatment schedule should be ~12 weeks from today.

## 2025-04-16 NOTE — PATIENT INSTRUCTIONS
Refill policies:    Allow 2-3 business days for refills; controlled substances may take longer.  Contact your pharmacy at least 5 days prior to running out of medication and have them send an electronic request or submit request through the “request refill” option in your [employer] account.  Refills are not addressed on weekends; covering physicians do not authorize routine medications on weekends.  No narcotics or controlled substances are refilled after noon on Fridays or by on call physicians.  By law, narcotics must be electronically prescribed.  A 30 day supply with no refills is the maximum allowed.  If your prescription is due for a refill, you may be due for a follow up appointment.  To best provide you care, patients receiving routine medications need to be seen at least once a year.  Patients receiving narcotic/controlled substance medications need to be seen at least once every 3 months.  In the event that your preferred pharmacy does not have the requested medication in stock (e.g. Backordered), it is your responsibility to find another pharmacy that has the requested medication available.  We will gladly send a new prescription to that pharmacy at your request.    Scheduling Tests:    If your physician has ordered radiology tests such as MRI or CT scans, please contact Central Scheduling at [phone] right away to schedule the test.  Once scheduled, the [facility] Centralized Referral Team will work with your insurance carrier to obtain pre-certification or prior authorization.  Depending on your insurance carrier, approval may take 3-10 days.  It is highly recommended patients assure they have received an authorization before having a test performed.  If test is done without insurance authorization, patient may be responsible for the entire amount billed.      Precertification and Prior Authorizations:  If your physician has recommended that you have a procedure or additional testing performed the [facility]  Centralized Referral Team will contact your insurance carrier to obtain pre-certification or prior authorization.    You are strongly encouraged to contact your insurance carrier to verify that your procedure/test has been approved and is a COVERED benefit.  Although the [facility] Centralized Referral Team does its due diligence, the insurance carrier gives the disclaimer that \"Although the procedure is authorized, this does not guarantee payment.\"    Ultimately the patient is responsible for payment.   Thank you for your understanding in this matter.  Paperwork Completion:  If you require FMLA or disability paperwork for your recovery, please make sure to either drop it off or have it faxed to our office at [phone]. Be sure the form has your name and date of birth on it.  The form will be faxed to our Forms Department and they will complete it for you.  There is a 25$ fee for all forms that need to be filled out.  Please be aware there is a 10-14 day turnaround time.  You will need to sign a release of information (MICHELLE) form if your paperwork does not come with one.  You may call the Forms Department with any questions at [phone].  Their fax number is [phone].

## 2025-04-25 DIAGNOSIS — M35.9 UNDIFFERENTIATED CONNECTIVE TISSUE DISEASE (HCC): ICD-10-CM

## 2025-04-25 DIAGNOSIS — M35.00 SJOGREN SYNDROME, UNSPECIFIED (HCC): ICD-10-CM

## 2025-04-28 RX ORDER — HYDROXYCHLOROQUINE SULFATE 200 MG/1
200 TABLET, FILM COATED ORAL 2 TIMES DAILY
Qty: 180 TABLET | Refills: 1 | Status: SHIPPED | OUTPATIENT
Start: 2025-04-28

## 2025-04-28 NOTE — TELEPHONE ENCOUNTER
Hydroxychloroquine 200 mg    Future Appointments   Date Time Provider Department Center   6/19/2025  2:40 PM Philip Johnson MD [facility] [address]   7/16/2025  9:20 AM Emilie Duque MD [facility]     Last office visit; 10/4/2024    Last fill; 2/12/2024 180 tab, 1 refills

## 2025-06-06 NOTE — TELEPHONE ENCOUNTER
Medication: rOPINIRole 0.25 MG Oral Tab      Date of last refill: 10/4/2024 (#60/5)  [place]     Last office visit: 4/16/2025 - Botox  Due back to clinic per last office note:  12 weeks for botox  Date next office visit scheduled:    Future Appointments   Date Time Provider Department Center   6/19/2025  2:40 PM Philip Johnson MD [facility] [address]   7/16/2025  9:20 AM Emilie Duque MD [facility]           Last OV note recommendation:    Botox procedure note

## 2025-06-09 RX ORDER — ROPINIROLE 0.25 MG/1
0.5 TABLET, FILM COATED ORAL NIGHTLY
Qty: 60 TABLET | Refills: 5 | Status: SHIPPED | OUTPATIENT
Start: 2025-06-09

## 2025-06-18 ENCOUNTER — PATIENT MESSAGE (OUTPATIENT)
Dept: FAMILY MEDICINE CLINIC | Facility: CLINIC | Age: 45
End: 2025-06-18

## 2025-06-18 DIAGNOSIS — F98.8 ATTENTION DEFICIT DISORDER (ADD) WITHOUT HYPERACTIVITY: ICD-10-CM

## 2025-06-19 RX ORDER — DEXTROAMPHETAMINE SACCHARATE, AMPHETAMINE ASPARTATE, DEXTROAMPHETAMINE SULFATE AND AMPHETAMINE SULFATE 3.75; 3.75; 3.75; 3.75 MG/1; MG/1; MG/1; MG/1
15 TABLET ORAL 2 TIMES DAILY
Qty: 60 TABLET | Refills: 0 | Status: SHIPPED | OUTPATIENT
Start: 2025-06-19 | End: 2025-07-19

## 2025-06-19 NOTE — TELEPHONE ENCOUNTER
Requested Prescriptions     Pending Prescriptions Disp Refills    amphetamine-dextroamphetamine (ADDERALL) 15 MG Oral Tab 60 tablet 0     Sig: Take 1 tablet (15 mg total) by mouth 2 (two) times daily.       Last Refill: 3/25    Last OV: 10/31    Next OV: overdue for annual 4/2025

## 2025-06-19 NOTE — TELEPHONE ENCOUNTER
Medication follow-up virtual visit is needed to make sure she continues to get refills on Adderall please schedule for her.  1 month of Adderall sent to pharmacy

## 2025-06-20 NOTE — TELEPHONE ENCOUNTER
Left voicemail for patient aware medication at pharmacy, Instructed the patient to schedule follow up appointment with Lucila Aponte PA-C at [phone] or to schedule through [email or web address].

## 2025-06-27 RX ORDER — FLUOXETINE HYDROCHLORIDE 40 MG/1
40 CAPSULE ORAL DAILY
Qty: 90 CAPSULE | Refills: 0 | OUTPATIENT
Start: 2025-06-27

## 2025-07-14 NOTE — TELEPHONE ENCOUNTER
Requested Prescriptions     Pending Prescriptions Disp Refills    FLUoxetine HCl 40 MG Oral Cap 90 capsule 0     Sig: TAKE ONE CAPSULE BY MOUTH EVERY DAY (60MG DOSE)       Last Refill: 2/22    Last OV: 10/31/24    Next OV: 11/8

## 2025-07-15 RX ORDER — FLUOXETINE HYDROCHLORIDE 40 MG/1
CAPSULE ORAL
Qty: 90 CAPSULE | Refills: 0 | Status: SHIPPED | OUTPATIENT
Start: 2025-07-15

## 2025-07-15 NOTE — TELEPHONE ENCOUNTER
Future Appointments   Date Time Provider Department Center   11/8/2025 10:30 AM Lucila Aponte PA-C [facility]

## 2025-07-16 ENCOUNTER — OFFICE VISIT (OUTPATIENT)
Dept: NEUROLOGY | Facility: CLINIC | Age: 45
End: 2025-07-16
Payer: COMMERCIAL

## 2025-07-16 VITALS
DIASTOLIC BLOOD PRESSURE: 60 MMHG | BODY MASS INDEX: 34 KG/M2 | HEART RATE: 94 BPM | SYSTOLIC BLOOD PRESSURE: 98 MMHG | WEIGHT: 184 LBS | RESPIRATION RATE: 16 BRPM

## 2025-07-16 DIAGNOSIS — G25.81 RESTLESS LEG SYNDROME: ICD-10-CM

## 2025-07-16 DIAGNOSIS — G43.709 CHRONIC MIGRAINE W/O AURA W/O STATUS MIGRAINOSUS, NOT INTRACTABLE: Primary | ICD-10-CM

## 2025-07-16 RX ORDER — ROPINIROLE 0.5 MG/1
0.5 TABLET, FILM COATED ORAL NIGHTLY
Qty: 30 TABLET | Refills: 5 | Status: SHIPPED | OUTPATIENT
Start: 2025-07-16

## 2025-07-16 RX ORDER — ROPINIROLE 0.25 MG/1
0.25 TABLET, FILM COATED ORAL EVERY EVENING
Qty: 30 TABLET | Refills: 5 | Status: SHIPPED | OUTPATIENT
Start: 2025-07-16

## 2025-07-16 NOTE — PATIENT INSTRUCTIONS
Refill policies:     Contact your pharmacy at least 5 days prior to running out of medication and have them send an electronic request or submit request through the “request refill” option in your [employer] account.  Allow 3-5 business days for refills; controlled substances may take longer.  If your prescription is due for a refill, please make sure you have a follow up appointment scheduled with the appropriate prescribing physician.  To best provide you care, patients receiving routine medications need to be seen at least once a year.  Patients receiving narcotic/controlled substance medications need to be seen at least once every 3 months.  Patients receiving narcotic/controlled substance medications will be required to sign an Opioid Treatment Agreement/Contract.  Refills will not be refilled on weekends or holidays; on-call physicians will not refill routine medications.  No narcotics or controlled substances are refilled after noon on Fridays or by on-call physicians.  Federal Law states narcotics must be electronically prescribed.  A 30-day supply with no refills is the maximum allowed by law.  In the event that your preferred pharmacy does not have the requested medication in stock (e.g., Backordered), it is your responsibility to find another pharmacy that has the requested medication available.  We will gladly send a new prescription to that pharmacy at your request.

## 2025-07-16 NOTE — PROGRESS NOTES
Paperwork noting that patient may bear financial responsibility for procedure(s) performed in clinic today signed prior to proceeding with procedure(s).    Furthermore, patient notified that they should contact their insurer to verify that your procedure/test has been approved and is a COVERED benefit.  Although the [facility] staff does its due diligence, the insurance carrier gives the disclaimer that \"Although the procedure is authorized, this does not guarantee payment.\"    Ultimately the patient is responsible for payment.    Botox is:  [x] Buy and Bill  [] Patient Supplied      Botox Reauthorization Questions:  Has the patient experienced a reduction in frequency of migraines since starting Botox? yes  If yes, by what percentage? 60%  Has the intensity of migraines decreased since starting Botox? yes  If yes, by what percentage? 50%    [x] I have discussed with patient that if their insurance changes they must contact the office right away with that information so that a new prior authorization can be completed.  Patient verbalized understanding that Botox cannot be performed without a current prior authorization in place with correct insurance.
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
## 2025-08-14 ENCOUNTER — TELEPHONE (OUTPATIENT)
Dept: FAMILY MEDICINE CLINIC | Facility: CLINIC | Age: 45
End: 2025-08-14

## 2025-08-14 RX ORDER — FLUOXETINE HYDROCHLORIDE 40 MG/1
40 CAPSULE ORAL DAILY
Qty: 90 CAPSULE | Refills: 0 | OUTPATIENT
Start: 2025-08-14

## (undated) DIAGNOSIS — F41.1 GAD (GENERALIZED ANXIETY DISORDER): ICD-10-CM

## (undated) DIAGNOSIS — F33.40 DEPRESSION, MAJOR, RECURRENT, IN REMISSION (HCC): ICD-10-CM

## (undated) NOTE — ED AVS SNAPSHOT
THE [facility] Emergency Department in [address]    Phone:  [phone]    Fax:  [address] [facility] [place]   MRN: [id number]    Department:  THE [facility] Emergency Department in [place]   Date of IF THERE IS ANY CHANGE OR WORSENING OF YOUR CONDITION, CALL YOUR PRIMARY CARE PHYSICIAN AT ONCE OR RETURN IMMEDIATELY TO THE EMERGENCY DEPARTMENT.     If you have been prescribed any medication(s), please fill your prescription right away and begin taking t

## (undated) NOTE — ED AVS SNAPSHOT
THE [facility] Emergency Department in [address] [facility]    Phone:  [phone]    Fax:  [address] [facility]   MRN: [id number]    Department:  THE [facility] Emergency Department in [place]   Date of You can get these medications from any pharmacy     Bring a paper prescription for each of these medications    - HYDROcodone-acetaminophen 5-325 MG Tabs  - Nitrofurantoin Monohyd Macro 100 MG Caps  - Phenazopyridine HCl 100 MG Tabs              Discharge tell this physician (or your personal doctor if your instructions are to return to your personal doctor) about any new or lasting problems. The primary care or specialist physician will see patients referred from the [facility] Emergency Department.  [place] - If you are a smoker or have smoked in the last 12 months, we encourage you to explore options for quitting.     - If you have concerns related to behavioral health issues or thoughts of harming yourself, contact [facility] a MyChart     Visit [employer]  You can access your MyChart to more actively manage your health care and view more details from this visit by going to [email or web address].   If you've recently had a stay at the Hospital you can access your discharge ins

## (undated) NOTE — LETTER
04/23/18        Demetri Lincoln  [address] [place] [address]      Dear Jerry Deshawn,    [facility] records indicate that you have outstanding lab work and or testing that was ordered for you and has not yet been completed:       B12   vit d  cbc   Cortis

## (undated) NOTE — LETTER
24    RE: Adelina Desir  : 1980        You have received this message to inform you that your patient, Adelina Desir, needs to return for follow up breast imaging which has been recommended by our radiologist.     After several attempts, we have been unsuccessful in reaching your patient or we have been unsuccessful in scheduling your patient as the patient has declined to schedule the recommended follow up breast imaging appointment.     We encourage you to speak to your patient about the importance of scheduling this recommended breast imaging exam. When your patient is ready, please have your patient call [phone] to schedule this exam at one of our [place] locations or [phone] to schedule at one of our [place] locations.    Thank you for your attention to this matter.    [facility] Breast Imaging Department

## (undated) NOTE — MR AVS SNAPSHOT
EMG [address] [facility]  [address]  [place] [address]  [phone]               Thank you for choosing us for your health care visit with ALMA Caba. We are glad to serve you and happy to provide you with this summary of your visit.   Please h TAKE 1 TABLET BY MOUTH AS NEEDED FOR MIGRAINE (TAKE AT ONSET AND REPEAT 2 HOURS LATER IF NEEDED). Commonly known as:  MAXALT           VITAMIN B-12 OR   Take 1,000 mcg by mouth daily. VITAMIN C OR   1 tablet as needed.            Vitamin D3 1000 Start activities slowly and build up over time Do what you like   Get your heart pumping – brisk walking, biking, swimming Even 10 minute increments are effective and add up over the week   2 ½ hours per week – spread out over time Use a renard to keep you

## (undated) NOTE — MR AVS SNAPSHOT
[address] [facility]  [phone]               Thank you for choosing us for your health care visit with Riky Salazar PA-C.   We are glad to serve you and happy to provide you with [facility] you understand how and when to take each. Commonly known as:  WELLBUTRIN XL           * BuPROPion HCl ER (XL) 150 MG Tb24   Take 1 tablet (150 mg total) by mouth daily. What changed:   You were already taking a medication with the same name, and this pr - BusPIRone HCl 10 MG Tabs  - Rizatriptan Benzoate 10 MG Tabs            Results of Recent Testing     URINALYSIS, AUTO, W/O SCOPE      Component Value Standard Range & Units    GLUCOSE (URINE DIPSTICK) Negative Negative mg/dL    BILIRUBIN Negative Negativ